# Patient Record
Sex: MALE | Race: BLACK OR AFRICAN AMERICAN | NOT HISPANIC OR LATINO | Employment: OTHER | URBAN - METROPOLITAN AREA
[De-identification: names, ages, dates, MRNs, and addresses within clinical notes are randomized per-mention and may not be internally consistent; named-entity substitution may affect disease eponyms.]

---

## 2017-01-03 RX ORDER — CITALOPRAM 20 MG/1
TABLET ORAL
Qty: 180 TABLET | Refills: 3 | Status: SHIPPED | OUTPATIENT
Start: 2017-01-03 | End: 2017-04-20

## 2017-01-03 RX ORDER — VENLAFAXINE HYDROCHLORIDE 75 MG/1
CAPSULE, EXTENDED RELEASE ORAL
Qty: 360 CAPSULE | Refills: 3 | Status: SHIPPED | OUTPATIENT
Start: 2017-01-03 | End: 2018-02-09 | Stop reason: SDUPTHER

## 2017-01-23 RX ORDER — ALLOPURINOL 300 MG/1
TABLET ORAL
Qty: 30 TABLET | Refills: 0 | Status: SHIPPED | OUTPATIENT
Start: 2017-01-23 | End: 2017-01-27 | Stop reason: SDUPTHER

## 2017-01-23 RX ORDER — AMLODIPINE BESYLATE AND BENAZEPRIL HYDROCHLORIDE 10; 20 MG/1; MG/1
CAPSULE ORAL
Qty: 30 CAPSULE | Refills: 0 | Status: SHIPPED | OUTPATIENT
Start: 2017-01-23 | End: 2017-01-27 | Stop reason: SDUPTHER

## 2017-01-27 RX ORDER — ALLOPURINOL 300 MG/1
300 TABLET ORAL DAILY
Qty: 30 TABLET | Refills: 0 | Status: SHIPPED | OUTPATIENT
Start: 2017-01-27 | End: 2017-02-15 | Stop reason: SDUPTHER

## 2017-01-27 RX ORDER — AMLODIPINE BESYLATE AND BENAZEPRIL HYDROCHLORIDE 10; 20 MG/1; MG/1
1 CAPSULE ORAL DAILY
Qty: 30 CAPSULE | Refills: 0 | Status: SHIPPED | OUTPATIENT
Start: 2017-01-27 | End: 2017-04-10 | Stop reason: SDUPTHER

## 2017-01-31 ENCOUNTER — OFFICE VISIT (OUTPATIENT)
Dept: FAMILY MEDICINE CLINIC | Facility: CLINIC | Age: 62
End: 2017-01-31

## 2017-01-31 VITALS
OXYGEN SATURATION: 99 % | HEART RATE: 87 BPM | HEIGHT: 72 IN | WEIGHT: 315 LBS | TEMPERATURE: 97.7 F | SYSTOLIC BLOOD PRESSURE: 142 MMHG | DIASTOLIC BLOOD PRESSURE: 100 MMHG | BODY MASS INDEX: 42.66 KG/M2

## 2017-01-31 DIAGNOSIS — R52 PAIN: Primary | ICD-10-CM

## 2017-01-31 DIAGNOSIS — M54.50 ACUTE MIDLINE LOW BACK PAIN WITHOUT SCIATICA: ICD-10-CM

## 2017-01-31 DIAGNOSIS — M25.512 ACUTE PAIN OF LEFT SHOULDER: ICD-10-CM

## 2017-01-31 DIAGNOSIS — M10.9 GOUT, UNSPECIFIED CAUSE, UNSPECIFIED CHRONICITY, UNSPECIFIED SITE: Primary | ICD-10-CM

## 2017-01-31 DIAGNOSIS — M54.2 NECK PAIN: ICD-10-CM

## 2017-01-31 DIAGNOSIS — I10 ESSENTIAL HYPERTENSION: ICD-10-CM

## 2017-01-31 LAB
ALBUMIN SERPL-MCNC: 4 G/DL (ref 3.5–5.2)
ALBUMIN/GLOB SERPL: 1.1 G/DL
ALP SERPL-CCNC: 68 U/L (ref 39–117)
ALT SERPL W P-5'-P-CCNC: 17 U/L (ref 1–41)
ANION GAP SERPL CALCULATED.3IONS-SCNC: 12.8 MMOL/L
AST SERPL-CCNC: 18 U/L (ref 1–40)
BILIRUB SERPL-MCNC: 0.3 MG/DL (ref 0.1–1.2)
BUN BLD-MCNC: 12 MG/DL (ref 8–23)
BUN/CREAT SERPL: 11.4 (ref 7–25)
CALCIUM SPEC-SCNC: 9.4 MG/DL (ref 8.6–10.5)
CHLORIDE SERPL-SCNC: 101 MMOL/L (ref 98–107)
CO2 SERPL-SCNC: 29.2 MMOL/L (ref 22–29)
CREAT BLD-MCNC: 1.05 MG/DL (ref 0.76–1.27)
GFR SERPL CREATININE-BSD FRML MDRD: 87 ML/MIN/1.73
GLOBULIN UR ELPH-MCNC: 3.5 GM/DL
GLUCOSE BLD-MCNC: 76 MG/DL (ref 65–99)
POTASSIUM BLD-SCNC: 4 MMOL/L (ref 3.5–5.2)
PROT SERPL-MCNC: 7.5 G/DL (ref 6–8.5)
SODIUM BLD-SCNC: 143 MMOL/L (ref 136–145)
URATE SERPL-MCNC: 4.6 MG/DL (ref 3.4–7)

## 2017-01-31 PROCEDURE — 80053 COMPREHEN METABOLIC PANEL: CPT | Performed by: INTERNAL MEDICINE

## 2017-01-31 PROCEDURE — 99214 OFFICE O/P EST MOD 30 MIN: CPT | Performed by: INTERNAL MEDICINE

## 2017-01-31 PROCEDURE — 73030 X-RAY EXAM OF SHOULDER: CPT | Performed by: INTERNAL MEDICINE

## 2017-01-31 PROCEDURE — 84550 ASSAY OF BLOOD/URIC ACID: CPT | Performed by: INTERNAL MEDICINE
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
## 2017-02-07 ENCOUNTER — HOSPITAL ENCOUNTER (OUTPATIENT)
Dept: PHYSICAL THERAPY | Facility: HOSPITAL | Age: 62
Setting detail: THERAPIES SERIES
Discharge: HOME | End: 2017-02-07
Attending: INTERNAL MEDICINE

## 2017-02-07 ENCOUNTER — TELEPHONE (OUTPATIENT)
Dept: FAMILY MEDICINE CLINIC | Facility: CLINIC | Age: 62
End: 2017-02-07

## 2017-02-07 DIAGNOSIS — S83.92XD SPRAIN OF LEFT KNEE, UNSPECIFIED LIGAMENT, SUBSEQUENT ENCOUNTER: Primary | ICD-10-CM

## 2017-02-07 DIAGNOSIS — S63.502D LEFT WRIST SPRAIN, SUBSEQUENT ENCOUNTER: ICD-10-CM

## 2017-02-07 DIAGNOSIS — S92.215D CLOSED NONDISPLACED FRACTURE OF CUBOID OF LEFT FOOT WITH ROUTINE HEALING, SUBSEQUENT ENCOUNTER: ICD-10-CM

## 2017-02-07 PROCEDURE — 97162 PT EVAL MOD COMPLEX 30 MIN: CPT | Performed by: PHYSICAL THERAPIST

## 2017-02-07 NOTE — TELEPHONE ENCOUNTER
LM INFORMING PT TO CALL US AND LET US KNOW WHICH DR HE WANTS TO SEE    ----- Message from Avel Smith Jr., MD sent at 2/7/2017 11:21 AM EST -----  Contact: [phone]  Patient family used orthopedic surgeon before and he was going to get back with us on who he wanted to use  ----- Message -----     From: Jessica Samuels     Sent: 2/7/2017   8:28 AM       To: Avel Smith Jr., MD, Madhuri Valera    Not sure what they are talking about, no referral in computer  ----- Message -----     From: Madhuri Valera     Sent: 2/7/2017   8:05 AM       To: Jessica Samuels    PT WANTS STATUS OF HIS BONE SPECIALIST REFERRAL

## 2017-02-09 RX ORDER — ZOLPIDEM TARTRATE 10 MG/1
TABLET ORAL
Qty: 30 TABLET | Refills: 0 | OUTPATIENT
Start: 2017-02-09 | End: 2017-03-11 | Stop reason: SDUPTHER
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
## 2017-02-13 RX ORDER — AMLODIPINE BESYLATE AND BENAZEPRIL HYDROCHLORIDE 10; 20 MG/1; MG/1
CAPSULE ORAL
Qty: 30 CAPSULE | Refills: 0 | Status: SHIPPED | OUTPATIENT
Start: 2017-02-13 | End: 2017-02-15 | Stop reason: SDUPTHER

## 2017-02-16 RX ORDER — ALLOPURINOL 300 MG/1
TABLET ORAL
Qty: 90 TABLET | Refills: 3 | Status: SHIPPED | OUTPATIENT
Start: 2017-02-16 | End: 2018-02-04 | Stop reason: SDUPTHER

## 2017-02-16 RX ORDER — SPIRONOLACTONE 25 MG/1
TABLET ORAL
Qty: 90 TABLET | Refills: 3 | Status: SHIPPED | OUTPATIENT
Start: 2017-02-16 | End: 2018-02-04 | Stop reason: SDUPTHER

## 2017-02-16 RX ORDER — AMLODIPINE BESYLATE AND BENAZEPRIL HYDROCHLORIDE 10; 20 MG/1; MG/1
CAPSULE ORAL
Qty: 90 CAPSULE | Refills: 3 | Status: SHIPPED | OUTPATIENT
Start: 2017-02-16 | End: 2018-02-01

## 2017-02-21 ENCOUNTER — HOSPITAL ENCOUNTER (OUTPATIENT)
Dept: PHYSICAL THERAPY | Facility: HOSPITAL | Age: 62
Setting detail: THERAPIES SERIES
Discharge: HOME | End: 2017-02-21

## 2017-02-21 DIAGNOSIS — S83.92XD SPRAIN OF LEFT KNEE, UNSPECIFIED LIGAMENT, SUBSEQUENT ENCOUNTER: Primary | ICD-10-CM

## 2017-02-21 DIAGNOSIS — S63.502D LEFT WRIST SPRAIN, SUBSEQUENT ENCOUNTER: ICD-10-CM

## 2017-02-21 DIAGNOSIS — S92.215D CLOSED NONDISPLACED FRACTURE OF CUBOID OF LEFT FOOT WITH ROUTINE HEALING, SUBSEQUENT ENCOUNTER: ICD-10-CM

## 2017-02-21 PROCEDURE — 97113 AQUATIC THERAPY/EXERCISES: CPT | Performed by: PHYSICAL THERAPIST
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
## 2017-02-23 ENCOUNTER — TELEPHONE (OUTPATIENT)
Dept: FAMILY MEDICINE CLINIC | Facility: CLINIC | Age: 62
End: 2017-02-23

## 2017-02-23 DIAGNOSIS — S92.212A: ICD-10-CM

## 2017-02-23 DIAGNOSIS — M25.512 LEFT SHOULDER PAIN, UNSPECIFIED CHRONICITY: Primary | ICD-10-CM
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
## 2017-02-24 ENCOUNTER — HOSPITAL ENCOUNTER (OUTPATIENT)
Dept: PHYSICAL THERAPY | Facility: HOSPITAL | Age: 62
Setting detail: THERAPIES SERIES
Discharge: HOME | End: 2017-02-24

## 2017-02-24 DIAGNOSIS — S92.215D CLOSED NONDISPLACED FRACTURE OF CUBOID OF LEFT FOOT WITH ROUTINE HEALING, SUBSEQUENT ENCOUNTER: ICD-10-CM

## 2017-02-24 DIAGNOSIS — S83.92XD SPRAIN OF LEFT KNEE, UNSPECIFIED LIGAMENT, SUBSEQUENT ENCOUNTER: Primary | ICD-10-CM

## 2017-02-24 DIAGNOSIS — S63.502D LEFT WRIST SPRAIN, SUBSEQUENT ENCOUNTER: ICD-10-CM

## 2017-02-24 PROCEDURE — 97113 AQUATIC THERAPY/EXERCISES: CPT | Performed by: PHYSICAL THERAPIST

## 2017-02-24 NOTE — PROGRESS NOTES
Outpatient Physical Therapy Ortho Treatment Note  [facility]     Patient Name: Maik Lara  : 1955  MRN: [id number]  Today's Date: 2017      Visit Date: 2017    Visit Dx:    ICD-10-CM ICD-9-CM   1. Sprain of left knee, unspecified ligament, subsequent encounter S83.92XD 844.9   2. Left wrist sprain, subsequent encounter S63.502D V58.89     842.00   3. Closed nondisplaced fracture of cuboid of left foot with routine healing, subsequent encounter S92.215D V54.19       There is no problem list on file for this patient.       Past Medical History   Diagnosis Date   • Allergic rhinitis    • Anxiety    • Arthritis    • Cholecystitis    • Cholelithiasis    • Chronic pain    • Depression    • Hypertension    • Rhabdomyolysis    • Sleep apnea         Past Surgical History   Procedure Laterality Date   • Anal fistulotomy     • Foot surgery Right              PT Ortho       17 1145    Subjective Comments    Subjective Comments Continues to wear cast shoe on L. Attempted to use mother's cane, but it's too small, so looking into purchasing a new one to use. Shoulder/upper trap pain on L is greater than foot pain at this time.  -JS    Subjective Pain    Able to rate subjective pain? yes  -JS    Pre-Treatment Pain Level 8   shoulder/upper trap. 5/10 L foot  -JS    Post-Treatment Pain Level 7   shoulder/upper trap. 4/10 L foot  -JS    Posture/Observations    Posture/Observations Comments Arrived to pool area with flip flops, no wrist brace  -JS      User Key  (r) = Recorded By, (t) = Taken By, (c) = Cosigned By    Initials Name Provider Type    MICHELLE Villanueva PT Physical Therapist                            PT Assessment/Plan       17 0905       PT Assessment    Functional Limitations --  -KH     Impairments --  -KH     Assessment Comments Utilized large noodle for hip stretches today which helped alleviate muscle tightness. Provided vcs during squats to bring shoulders forward in order to  maintain a balanced COG. Once again performed shoulder exercises in deep end and avoided adding resistance since patient felt sore after his first session.   -     PT Plan    PT Plan Comments Continue aquatic therapy for gait, ROM, strengthening, and flexibility.   -       User Key  (r) = Recorded By, (t) = Taken By, (c) = Cosigned By    Initials Name Provider Type    CRYSTAL Riddle, PT Physical Therapist                    Exercises       02/24/17 0900          Subjective Comments    Subjective Comments I feel like I need my own personal pharmacy with all the pills I'm on. I feel much better in the water though, especially in the deep end.   -      Subjective Pain    Able to rate subjective pain? yes  -      Pre-Treatment Pain Level 8  -      Post-Treatment Pain Level 7  -      Aquatics    Aquatics performed? Yes  -      Exercise 1    Exercise Name 1 Refer to aquatics flow sheet  -        User Key  (r) = Recorded By, (t) = Taken By, (c) = Cosigned By    Initials Name Provider Type    CRYSTAL Riddle, PT Physical Therapist                               PT OP Goals       02/24/17 0900       PT Short Term Goals    STG Date to Achieve 02/21/17  -     STG 1 Independent with initial home program for ROM/strength.  -     STG 1 Progress Met  -     STG 2 Pt to report decreased pain during water ex to 3 or less.  -     STG 2 Progress Ongoing  -     STG 2 Progress Comments better in the water but still ~5/20  -     STG 3 Pt to ambulate with cane for unlevel surfaces to decrease weight on foot fracture.  -     STG 3 Progress Ongoing  -     STG 3 Progress Comments hasn't bought cane yet  -     Long Term Goals    LTG Date to Achieve 03/07/17  -     LTG 1 Independent with aquatics program for self management of symptoms.  -     LTG 1 Progress Ongoing  -     LTG 2 Pt to increase LE strength so he can go 5xSS in 15 sec or less.  -     LTG 2 Progress Ongoing  -     LTG 3 Pt to improve  mobility so he can tolerate walking for 15 minutes without resting.  -CRYSTAL     LTG 3 Progress Ongoing  -CRYSTAL       User Key  (r) = Recorded By, (t) = Taken By, (c) = Cosigned By    Initials Name Provider Type    CRYSTAL Riddle PT Physical Therapist                    Time Calculation:   Start Time: 0905  Stop Time: 0945  Time Calculation (min): 40 min    Therapy Charges for Today     Code Description Service Date Service Provider Modifiers Qty    41286854112  PT AQUATIC THERAPY EA 15 MIN 2/24/2017 Caryn Riddle, PT GP 3                    Caryn Riddle PT  2/24/2017

## 2017-02-28 ENCOUNTER — HOSPITAL ENCOUNTER (OUTPATIENT)
Dept: PHYSICAL THERAPY | Facility: HOSPITAL | Age: 62
Setting detail: THERAPIES SERIES
Discharge: HOME | End: 2017-02-28

## 2017-02-28 DIAGNOSIS — S63.502D LEFT WRIST SPRAIN, SUBSEQUENT ENCOUNTER: ICD-10-CM

## 2017-02-28 DIAGNOSIS — S92.215D CLOSED NONDISPLACED FRACTURE OF CUBOID OF LEFT FOOT WITH ROUTINE HEALING, SUBSEQUENT ENCOUNTER: ICD-10-CM

## 2017-02-28 DIAGNOSIS — S83.92XD SPRAIN OF LEFT KNEE, UNSPECIFIED LIGAMENT, SUBSEQUENT ENCOUNTER: Primary | ICD-10-CM

## 2017-02-28 PROCEDURE — 97113 AQUATIC THERAPY/EXERCISES: CPT | Performed by: PHYSICAL THERAPIST

## 2017-03-03 ENCOUNTER — HOSPITAL ENCOUNTER (OUTPATIENT)
Dept: PHYSICAL THERAPY | Facility: HOSPITAL | Age: 62
Setting detail: THERAPIES SERIES
Discharge: HOME | End: 2017-03-03

## 2017-03-03 DIAGNOSIS — M54.50 ACUTE BILATERAL LOW BACK PAIN WITHOUT SCIATICA: ICD-10-CM

## 2017-03-03 DIAGNOSIS — M54.2 CERVICAL PAIN: Primary | ICD-10-CM

## 2017-03-03 DIAGNOSIS — M25.512 ACUTE PAIN OF LEFT SHOULDER: ICD-10-CM

## 2017-03-03 PROCEDURE — 97140 MANUAL THERAPY 1/> REGIONS: CPT | Performed by: PHYSICAL THERAPIST

## 2017-03-03 PROCEDURE — 97110 THERAPEUTIC EXERCISES: CPT | Performed by: PHYSICAL THERAPIST
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
## 2017-03-07 ENCOUNTER — HOSPITAL ENCOUNTER (OUTPATIENT)
Dept: PHYSICAL THERAPY | Facility: HOSPITAL | Age: 62
Setting detail: THERAPIES SERIES
Discharge: HOME | End: 2017-03-07

## 2017-03-07 DIAGNOSIS — M54.2 CERVICAL PAIN: Primary | ICD-10-CM

## 2017-03-07 DIAGNOSIS — S83.92XD SPRAIN OF LEFT KNEE, UNSPECIFIED LIGAMENT, SUBSEQUENT ENCOUNTER: ICD-10-CM

## 2017-03-07 DIAGNOSIS — M25.512 ACUTE PAIN OF LEFT SHOULDER: ICD-10-CM

## 2017-03-07 DIAGNOSIS — S92.215D CLOSED NONDISPLACED FRACTURE OF CUBOID OF LEFT FOOT WITH ROUTINE HEALING, SUBSEQUENT ENCOUNTER: ICD-10-CM

## 2017-03-07 DIAGNOSIS — S63.502D LEFT WRIST SPRAIN, SUBSEQUENT ENCOUNTER: ICD-10-CM

## 2017-03-07 DIAGNOSIS — M54.50 ACUTE BILATERAL LOW BACK PAIN WITHOUT SCIATICA: ICD-10-CM

## 2017-03-07 PROCEDURE — 97113 AQUATIC THERAPY/EXERCISES: CPT | Performed by: PHYSICAL THERAPIST

## 2017-03-07 NOTE — PROGRESS NOTES
Outpatient Physical Therapy Ortho Treatment Note  [facility]     Patient Name: Maik Lara  : 1955  MRN: [id number]  Today's Date: 3/7/2017      Visit Date: 2017    Visit Dx:    ICD-10-CM ICD-9-CM   1. Cervical pain M54.2 723.1   2. Acute pain of left shoulder M25.512 719.41   3. Acute bilateral low back pain without sciatica M54.5 724.2     338.19   4. Sprain of left knee, unspecified ligament, subsequent encounter S83.92XD 844.9   5. Left wrist sprain, subsequent encounter S63.502D V58.89     842.00   6. Closed nondisplaced fracture of cuboid of left foot with routine healing, subsequent encounter S92.215D V54.19       There is no problem list on file for this patient.       Past Medical History   Diagnosis Date   • Allergic rhinitis    • Anxiety    • Arthritis    • Cholecystitis    • Cholelithiasis    • Chronic pain    • Depression    • Hypertension    • Rhabdomyolysis    • Sleep apnea         Past Surgical History   Procedure Laterality Date   • Anal fistulotomy     • Foot surgery Right                              PT Assessment/Plan       17 1208       PT Assessment    Assessment Comments Patient reports that he was sore after last session but able to feel he is using his muscles.  He still is painful to all reported joints with lesser pain walking in L foot.  -PB       User Key  (r) = Recorded By, (t) = Taken By, (c) = Cosigned By    Initials Name Provider Type    MARIA G Solano, PT Physical Therapist                    Exercises       17 0900          Subjective Comments    Subjective Comments Patient arrived late for appt  -PB      Subjective Pain    Able to rate subjective pain? yes  -PB      Pre-Treatment Pain Level 7  -PB      Post-Treatment Pain Level 6  -PB      Aquatics    Aquatics performed? Yes  -PB      Aquatics LE    Water Walk forward;side   x 3  -PB      Stretch 1 Quad SR x 60 sec  -PB      Stretch 2 HS LN x 60 sec  -PB      Vertical Traction LN with rail deep  water x 5 min  -PB      Abdominals --    KB 1/3 x 20  -PB      Hip Abd/Add Leg press LR x 20  -PB      March in Place --  -PB      Bicycle --  -PB      Aquatics UE    Stretch 1 --  -PB      Stretch 2 --  -PB      Scap Retraction/Row BP L5 x20  -PB      External Rotation @ 0 BP L5 x 20  -PB      I's/T's/Y's T BP L5 x 20  -PB      Abdominals Pelvic tilt with march x 10  -PB        User Key  (r) = Recorded By, (t) = Taken By, (c) = Cosigned By    Initials Name Provider Type    PB Susan Solano, PT Physical Therapist                                       Time Calculation:   Start Time: 1005  Stop Time: 1030  Time Calculation (min): 25 min    Therapy Charges for Today     Code Description Service Date Service Provider Modifiers Qty    76800624629 HC PT AQUATIC THERAPY EA 15 MIN 3/7/2017 Susan Solano, PT GP 2                    Susan Solano, PT  3/7/2017

## 2017-03-09 ENCOUNTER — HOSPITAL ENCOUNTER (OUTPATIENT)
Dept: PHYSICAL THERAPY | Facility: HOSPITAL | Age: 62
Setting detail: THERAPIES SERIES
Discharge: HOME | End: 2017-03-09

## 2017-03-09 DIAGNOSIS — M54.50 ACUTE BILATERAL LOW BACK PAIN WITHOUT SCIATICA: ICD-10-CM

## 2017-03-09 DIAGNOSIS — S92.215D CLOSED NONDISPLACED FRACTURE OF CUBOID OF LEFT FOOT WITH ROUTINE HEALING, SUBSEQUENT ENCOUNTER: ICD-10-CM

## 2017-03-09 DIAGNOSIS — M54.2 CERVICAL PAIN: Primary | ICD-10-CM

## 2017-03-09 DIAGNOSIS — M25.512 ACUTE PAIN OF LEFT SHOULDER: ICD-10-CM

## 2017-03-09 DIAGNOSIS — S63.502D LEFT WRIST SPRAIN, SUBSEQUENT ENCOUNTER: ICD-10-CM

## 2017-03-09 DIAGNOSIS — S83.92XD SPRAIN OF LEFT KNEE, UNSPECIFIED LIGAMENT, SUBSEQUENT ENCOUNTER: ICD-10-CM

## 2017-03-09 PROCEDURE — 97113 AQUATIC THERAPY/EXERCISES: CPT | Performed by: PHYSICAL THERAPIST

## 2017-03-09 NOTE — PROGRESS NOTES
Outpatient Physical Therapy Ortho Treatment Note  [facility]     Patient Name: Maik Lara  : 1955  MRN: [id number]  Today's Date: 3/9/2017      Visit Date: 2017    Visit Dx:    ICD-10-CM ICD-9-CM   1. Cervical pain M54.2 723.1   2. Acute pain of left shoulder M25.512 719.41   3. Acute bilateral low back pain without sciatica M54.5 724.2     338.19   4. Sprain of left knee, unspecified ligament, subsequent encounter S83.92XD 844.9   5. Left wrist sprain, subsequent encounter S63.502D V58.89     842.00   6. Closed nondisplaced fracture of cuboid of left foot with routine healing, subsequent encounter S92.215D V54.19       There is no problem list on file for this patient.       Past Medical History   Diagnosis Date   • Allergic rhinitis    • Anxiety    • Arthritis    • Cholecystitis    • Cholelithiasis    • Chronic pain    • Depression    • Hypertension    • Rhabdomyolysis    • Sleep apnea         Past Surgical History   Procedure Laterality Date   • Anal fistulotomy     • Foot surgery Right                              PT Assessment/Plan       17 1218       PT Assessment    Assessment Comments Patient reports degree of less muscle  and shoulder soreness; he still has ringing in ears and as been recommended to see ENT that I suggest he contact primary MD to help expediate appointment.  Treatment for left shoulder impingement also ordered by Dr. Jameel Quick with script in chart.   Maik id following HEP with band for shoulder.   -PB       User Key  (r) = Recorded By, (t) = Taken By, (c) = Cosigned By    Initials Name Provider Type    PB Susan Solano, PT Physical Therapist                    Exercises       17 1200          Subjective Comments    Subjective Comments Saw ortho MD Muro for shoulder and he saw 2 spurs on x-ray and gave him steroid injection and script to continue PT  -PB      Subjective Pain    Able to rate subjective pain? yes  -PB      Pre-Treatment Pain Level 6  -PB       Post-Treatment Pain Level 6  -PB      Subjective Pain Comment Patient reports that he is feeling less sore all over but continues to have ringing in ears that keeps him from playing his instruments   -PB      Aquatics    Aquatics performed? Yes  -PB      Aquatics LE    Water Walk forward;side   x 3  -PB      Stretch 1 Quad LN x 60 sec  -PB      Stretch 2 HS LN x 60 sec  -PB      Vertical Traction LN with rail deep water x 5 min  -PB      Abdominals --   LN x 20   -PB      Bicycle LN x 2 min  -PB      Aquatics UE    Scap Retraction/Row BP L5 x20  -PB      Scaption Abd/Add BP L5 x 20   -PB      External Rotation @ 0 BP L5 x 20  -PB      I's/T's/Y's T BP L5 x 20  -PB      Abdominals Pelvic tilt with march x 10  -PB        User Key  (r) = Recorded By, (t) = Taken By, (c) = Cosigned By    Initials Name Provider Type    PB Susan Solano, PT Physical Therapist                                       Time Calculation:   Start Time: 0946  Stop Time: 1030  Time Calculation (min): 44 min    Therapy Charges for Today     Code Description Service Date Service Provider Modifiers Qty    39448341810 HC PT AQUATIC THERAPY EA 15 MIN 3/9/2017 Susan Solano, PT GP 3                    Susan Solano, PT  3/9/2017

## 2017-03-11 RX ORDER — ZOLPIDEM TARTRATE 10 MG/1
TABLET ORAL
Qty: 30 TABLET | Refills: 0 | OUTPATIENT
Start: 2017-03-11 | End: 2017-04-14 | Stop reason: SDUPTHER

## 2017-03-14 ENCOUNTER — HOSPITAL ENCOUNTER (OUTPATIENT)
Dept: PHYSICAL THERAPY | Facility: HOSPITAL | Age: 62
Setting detail: THERAPIES SERIES
Discharge: HOME | End: 2017-03-14

## 2017-03-14 DIAGNOSIS — M25.512 ACUTE PAIN OF LEFT SHOULDER: ICD-10-CM

## 2017-03-14 DIAGNOSIS — S83.92XD SPRAIN OF LEFT KNEE, UNSPECIFIED LIGAMENT, SUBSEQUENT ENCOUNTER: ICD-10-CM

## 2017-03-14 DIAGNOSIS — M54.2 CERVICAL PAIN: Primary | ICD-10-CM

## 2017-03-14 DIAGNOSIS — S63.502D LEFT WRIST SPRAIN, SUBSEQUENT ENCOUNTER: ICD-10-CM

## 2017-03-14 DIAGNOSIS — S92.215D CLOSED NONDISPLACED FRACTURE OF CUBOID OF LEFT FOOT WITH ROUTINE HEALING, SUBSEQUENT ENCOUNTER: ICD-10-CM

## 2017-03-14 DIAGNOSIS — M54.50 ACUTE BILATERAL LOW BACK PAIN WITHOUT SCIATICA: ICD-10-CM

## 2017-03-14 PROCEDURE — 97113 AQUATIC THERAPY/EXERCISES: CPT | Performed by: PHYSICAL THERAPIST

## 2017-03-14 NOTE — PROGRESS NOTES
Outpatient Physical Therapy Ortho Progress Note  [facility]     Patient Name: Maik Lara  : 1955  MRN: [id number]  Today's Date: 3/14/2017      Visit Date: 2017    Visit Dx:    ICD-10-CM ICD-9-CM   1. Cervical pain M54.2 723.1   2. Acute pain of left shoulder M25.512 719.41   3. Acute bilateral low back pain without sciatica M54.5 724.2     338.19   4. Sprain of left knee, unspecified ligament, subsequent encounter S83.92XD 844.9   5. Left wrist sprain, subsequent encounter S63.502D V58.89     842.00   6. Closed nondisplaced fracture of cuboid of left foot with routine healing, subsequent encounter S92.215D V54.19       There is no problem list on file for this patient.       Past Medical History   Diagnosis Date   • Allergic rhinitis    • Anxiety    • Arthritis    • Cholecystitis    • Cholelithiasis    • Chronic pain    • Depression    • Hypertension    • Rhabdomyolysis    • Sleep apnea         Past Surgical History   Procedure Laterality Date   • Anal fistulotomy     • Foot surgery Right                              PT Assessment/Plan       17 1213       PT Assessment    Assessment Comments Patient reporting decreasing pain symptoms and ability to drive 2.5 hr round trip over weekend with less neck rotation required on highway   -PB       User Key  (r) = Recorded By, (t) = Taken By, (c) = Cosigned By    Initials Name Provider Type    MARIA G Solano, PT Physical Therapist                    Exercises       17 1200 17 0900       Subjective Comments    Subjective Comments  Stiff but improving.  Still a struggle to turn head to drive.  -PB     Subjective Pain    Able to rate subjective pain?  yes  -PB     Pre-Treatment Pain Level  4  -PB     Post-Treatment Pain Level  4  -PB     Aquatics    Aquatics performed?  Yes  -PB     Aquatics LE    Water Walk  forward;side   x 3  -PB     Stretch 1  Quad LR x 60 sec  -PB     Stretch 2  HS LN x 60 sec  -PB     Vertical Traction  Deep  water with rail deep water x 5 min  -PB     Abdominals  --   KB x 20   -PB     Clams  Hip circles 10/2  -PB     Hip Abd/Add  Leg press LR x 20  -PB     March in Place  Abd brace with march x 20  -PB     Bicycle  Rail x 2 min  -PB     Aquatics UE    Scap Retraction/Row BP L5 x 30  -PB      Scaption Abd/Add BP L5 x 30  -PB      External Rotation @ 0 BP L5 x 30  -PB      I's/T's/Y's T BP L5 x 25  -PB      Abdominals Pelvic tilt with march x 10  -PB        User Key  (r) = Recorded By, (t) = Taken By, (c) = Cosigned By    Initials Name Provider Type    PB Susan Solano, PT Physical Therapist                                       Time Calculation:   Start Time: 0946  Stop Time: 1030  Time Calculation (min): 44 min    Therapy Charges for Today     Code Description Service Date Service Provider Modifiers Qty    65170921108 HC PT AQUATIC THERAPY EA 15 MIN 3/14/2017 Susan Solano, PT GP 3                    Susan Solano, PT  3/14/2017

## 2017-03-16 ENCOUNTER — HOSPITAL ENCOUNTER (OUTPATIENT)
Dept: PHYSICAL THERAPY | Facility: HOSPITAL | Age: 62
Setting detail: THERAPIES SERIES
Discharge: HOME | End: 2017-03-16

## 2017-03-16 DIAGNOSIS — S92.215D CLOSED NONDISPLACED FRACTURE OF CUBOID OF LEFT FOOT WITH ROUTINE HEALING, SUBSEQUENT ENCOUNTER: ICD-10-CM

## 2017-03-16 DIAGNOSIS — M54.50 ACUTE BILATERAL LOW BACK PAIN WITHOUT SCIATICA: ICD-10-CM

## 2017-03-16 DIAGNOSIS — S83.92XD SPRAIN OF LEFT KNEE, UNSPECIFIED LIGAMENT, SUBSEQUENT ENCOUNTER: ICD-10-CM

## 2017-03-16 DIAGNOSIS — S63.502D LEFT WRIST SPRAIN, SUBSEQUENT ENCOUNTER: ICD-10-CM

## 2017-03-16 DIAGNOSIS — M54.2 CERVICAL PAIN: Primary | ICD-10-CM

## 2017-03-16 DIAGNOSIS — M25.512 ACUTE PAIN OF LEFT SHOULDER: ICD-10-CM

## 2017-03-16 PROCEDURE — 97113 AQUATIC THERAPY/EXERCISES: CPT | Performed by: PHYSICAL THERAPIST

## 2017-03-16 NOTE — PROGRESS NOTES
Outpatient Physical Therapy Ortho Treatment Note  [facility]     Patient Name: Maik Marco  : 1955  MRN: [id number]  Today's Date: 3/16/2017      Visit Date: 2017    Visit Dx:    ICD-10-CM ICD-9-CM   1. Cervical pain M54.2 723.1   2. Acute pain of left shoulder M25.512 719.41   3. Acute bilateral low back pain without sciatica M54.5 724.2     338.19   4. Sprain of left knee, unspecified ligament, subsequent encounter S83.92XD 844.9   5. Left wrist sprain, subsequent encounter S63.502D V58.89     842.00   6. Closed nondisplaced fracture of cuboid of left foot with routine healing, subsequent encounter S92.215D V54.19       There is no problem list on file for this patient.       Past Medical History   Diagnosis Date   • Allergic rhinitis    • Anxiety    • Arthritis    • Cholecystitis    • Cholelithiasis    • Chronic pain    • Depression    • Hypertension    • Rhabdomyolysis    • Sleep apnea         Past Surgical History   Procedure Laterality Date   • Anal fistulotomy     • Foot surgery Right                              PT Assessment/Plan       17 1229       PT Assessment    Assessment Comments Even standing in deepest water Maik reports R hip pain with weight bearing on that leg.  He was able to perform increased hip ROM exercises with some soreness present   -PB       User Key  (r) = Recorded By, (t) = Taken By, (c) = Cosigned By    Initials Name Provider Type    PB Susan Solano, PT Physical Therapist                    Exercises       17 0900          Subjective Comments    Subjective Comments Today is not a great day.  Pain in R hip joint, L knee and foot both hurt and L shoulder sore.   -PB      Subjective Pain    Able to rate subjective pain? yes  -PB      Pre-Treatment Pain Level 6  -PB      Post-Treatment Pain Level 5  -PB      Aquatics    Aquatics performed? Yes  -PB      Aquatics LE    Water Walk forward;side   x 3  -PB      Stretch 1 Quad LN x 60 sec  -PB      Stretch 2  HS LN x 60 sec  -PB      Stretch 3 Piriformis standing x 30 sec  -PB      Stretch Other 1 Lower back at wall several times   -PB      Stretch Other 2 Squatting down to stretch lower back   -PB      Vertical Traction Deep water with rail deep water x 5 min  -PB      Abdominals --   LN x 20   -PB      Clams Hip circles 10/2; clams x 10   -PB      Hip Abd/Add --  -PB      March in Place Abd brace with march x 20  -PB      Bicycle Rail x 2 min  -PB      Aquatics UE    Scap Retraction/Row BP L5 x 30  -PB      Scaption Abd/Add BP L5 x 30  -PB      External Rotation @ 0 BP L5 x 30  -PB      I's/T's/Y's T BP L5 x 25  -PB      Abdominals Pelvic tilt with march x 10  -PB        User Key  (r) = Recorded By, (t) = Taken By, (c) = Cosigned By    Initials Name Provider Type    PB Susan Solano, PT Physical Therapist                                       Time Calculation:   Start Time: 0946  Stop Time: 1030  Time Calculation (min): 44 min    Therapy Charges for Today     Code Description Service Date Service Provider Modifiers Qty    13569287348 HC PT AQUATIC THERAPY EA 15 MIN 3/16/2017 Susan Solano, PT GP 3                    Susan Solano, PT  3/16/2017

## 2017-03-21 ENCOUNTER — HOSPITAL ENCOUNTER (OUTPATIENT)
Dept: PHYSICAL THERAPY | Facility: HOSPITAL | Age: 62
Setting detail: THERAPIES SERIES
Discharge: HOME | End: 2017-03-21

## 2017-03-21 DIAGNOSIS — M54.2 CERVICAL PAIN: Primary | ICD-10-CM

## 2017-03-21 DIAGNOSIS — S92.215D CLOSED NONDISPLACED FRACTURE OF CUBOID OF LEFT FOOT WITH ROUTINE HEALING, SUBSEQUENT ENCOUNTER: ICD-10-CM

## 2017-03-21 DIAGNOSIS — S63.502D LEFT WRIST SPRAIN, SUBSEQUENT ENCOUNTER: ICD-10-CM

## 2017-03-21 DIAGNOSIS — M54.50 ACUTE BILATERAL LOW BACK PAIN WITHOUT SCIATICA: ICD-10-CM

## 2017-03-21 DIAGNOSIS — S83.92XD SPRAIN OF LEFT KNEE, UNSPECIFIED LIGAMENT, SUBSEQUENT ENCOUNTER: ICD-10-CM

## 2017-03-21 DIAGNOSIS — M25.512 ACUTE PAIN OF LEFT SHOULDER: ICD-10-CM

## 2017-03-21 PROCEDURE — 97113 AQUATIC THERAPY/EXERCISES: CPT | Performed by: PHYSICAL THERAPIST

## 2017-03-21 NOTE — PROGRESS NOTES
Outpatient Physical Therapy Ortho Treatment Note  [facility]     Patient Name: Maik Lara  : 1955  MRN: [id number]  Today's Date: 3/21/2017      Visit Date: 2017    Visit Dx:    ICD-10-CM ICD-9-CM   1. Cervical pain M54.2 723.1   2. Acute pain of left shoulder M25.512 719.41   3. Acute bilateral low back pain without sciatica M54.5 724.2     338.19   4. Sprain of left knee, unspecified ligament, subsequent encounter S83.92XD 844.9   5. Left wrist sprain, subsequent encounter S63.502D V58.89     842.00   6. Closed nondisplaced fracture of cuboid of left foot with routine healing, subsequent encounter S92.215D V54.19       There is no problem list on file for this patient.       Past Medical History   Diagnosis Date   • Allergic rhinitis    • Anxiety    • Arthritis    • Cholecystitis    • Cholelithiasis    • Chronic pain    • Depression    • Hypertension    • Rhabdomyolysis    • Sleep apnea         Past Surgical History   Procedure Laterality Date   • Anal fistulotomy     • Foot surgery Right                              PT Assessment/Plan       17 1245       PT Assessment    Assessment Comments Maik reports new pain symptoms radiating more to B groins that may be referred from upper lumbar so focused on more core strength and stretching today that feels good to him.  -PB       User Key  (r) = Recorded By, (t) = Taken By, (c) = Cosigned By    Initials Name Provider Type    PB Susan Solano, PT Physical Therapist                    Exercises       17 0900          Subjective Comments    Subjective Comments Both groin muscles hurt since waking up Sat.  Feels like it might be nerve.  Left second finger locked up and muscle twitching going down both arms.  -PB      Subjective Pain    Able to rate subjective pain? yes  -PB      Pre-Treatment Pain Level 6  -PB      Post-Treatment Pain Level 5  -PB      Subjective Pain Comment Right lower back and groins  -PB      Aquatics    Aquatics  "performed? Yes  -PB      Aquatics LE    Water Walk forward;side   x 3  -PB      Stretch 1 Hip adductors 30\"/2  -PB      Stretch 2 HS LN x 60 sec  -PB      Stretch 3 Piriformis standing x 30 sec  -PB      Stretch Other 1 Lower back at wall several times   -PB      Stretch Other 2 Squatting down to stretch lower back   -PB      Vertical Traction Deep water with rail deep water x 5 min  -PB      Abdominals --   LN x 20, KB x 20  -PB      Clams --  -PB      Hip Abd/Add Leg press LR x 20  -PB      March in Place Abd brace with march x 20  -PB      Bicycle Rail x 2 min  -PB      Aquatics UE    Scap Retraction/Row BP L5 x 15  -PB      Scaption Abd/Add BP L5 x 15  -PB      External Rotation @ 0 BP L5 x 30  -PB      I's/T's/Y's T BP L5 x 25  -PB      Abdominals Pelvic tilt with march x 10  -PB        User Key  (r) = Recorded By, (t) = Taken By, (c) = Cosigned By    Initials Name Provider Type    PB Susan Solano, PT Physical Therapist                                       Time Calculation:   Start Time: 0946  Stop Time: 1030  Time Calculation (min): 44 min    Therapy Charges for Today     Code Description Service Date Service Provider Modifiers Qty    68699313628 HC PT AQUATIC THERAPY EA 15 MIN 3/21/2017 Susan Solano, PT GP 3                    Susan Solano, PT  3/21/2017     "

## 2017-03-22 ENCOUNTER — HOSPITAL ENCOUNTER (OUTPATIENT)
Dept: PHYSICAL THERAPY | Facility: HOSPITAL | Age: 62
Setting detail: THERAPIES SERIES
Discharge: HOME | End: 2017-03-22

## 2017-03-22 DIAGNOSIS — S92.215D CLOSED NONDISPLACED FRACTURE OF CUBOID OF LEFT FOOT WITH ROUTINE HEALING, SUBSEQUENT ENCOUNTER: ICD-10-CM

## 2017-03-22 DIAGNOSIS — M25.512 ACUTE PAIN OF LEFT SHOULDER: ICD-10-CM

## 2017-03-22 DIAGNOSIS — M54.50 ACUTE BILATERAL LOW BACK PAIN WITHOUT SCIATICA: ICD-10-CM

## 2017-03-22 DIAGNOSIS — M54.2 CERVICAL PAIN: Primary | ICD-10-CM

## 2017-03-22 DIAGNOSIS — S63.502D LEFT WRIST SPRAIN, SUBSEQUENT ENCOUNTER: ICD-10-CM

## 2017-03-22 DIAGNOSIS — S83.92XD SPRAIN OF LEFT KNEE, UNSPECIFIED LIGAMENT, SUBSEQUENT ENCOUNTER: ICD-10-CM

## 2017-03-22 PROCEDURE — 97110 THERAPEUTIC EXERCISES: CPT | Performed by: PHYSICAL THERAPIST

## 2017-03-22 PROCEDURE — 97140 MANUAL THERAPY 1/> REGIONS: CPT | Performed by: PHYSICAL THERAPIST

## 2017-03-22 NOTE — PROGRESS NOTES
Outpatient Physical Therapy Ortho Treatment Note  [facility]     Patient Name: Maik Marco  : 1955  MRN: [id number]  Today's Date: 3/22/2017      Visit Date: 2017    Visit Dx:    ICD-10-CM ICD-9-CM   1. Cervical pain M54.2 723.1   2. Acute pain of left shoulder M25.512 719.41   3. Acute bilateral low back pain without sciatica M54.5 724.2     338.19   4. Sprain of left knee, unspecified ligament, subsequent encounter S83.92XD 844.9   5. Left wrist sprain, subsequent encounter S63.502D V58.89     842.00   6. Closed nondisplaced fracture of cuboid of left foot with routine healing, subsequent encounter S92.215D V54.19       There is no problem list on file for this patient.       Past Medical History:   Diagnosis Date   • Allergic rhinitis    • Anxiety    • Arthritis    • Cholecystitis    • Cholelithiasis    • Chronic pain    • Depression    • Hypertension    • Rhabdomyolysis    • Sleep apnea         Past Surgical History:   Procedure Laterality Date   • ANAL FISTULOTOMY     • FOOT SURGERY Right                              PT Assessment/Plan       17 1142 17 1245    PT Assessment    Assessment Comments Maik shows progress towards PT goals other than no change in ringing in his ears.  He is trying to get an ENT appointment scheduled with help from primary MD office.    -PB Maik reports new pain symptoms radiating more to B groins that may be referred from upper lumbar so focused on more core strength and stretching today that feels good to him.  -PB      User Key  (r) = Recorded By, (t) = Taken By, (c) = Cosigned By    Initials Name Provider Type    PB Susan Solano, PT Physical Therapist                    Exercises       17 1100          Subjective Comments    Subjective Comments I still have trouble looking over L shoulder driving.  The ringing in the ears in maybe a little worse.  I went by MD office again.   -PB      Subjective Pain    Able to rate subjective pain? yes   -PB      Pre-Treatment Pain Level 6  -PB      Post-Treatment Pain Level 5  -PB      Aquatics    Aquatics performed? No  -PB      Exercise 1    Exercise Name 1 Land based exercises performed for passive stretching B hamstrings, hip rotators in and out.  Patient demonstrated good isolation of lower abdominal muscles with single and B leg raises and able to extend legs straight with B hip abduction/ add for more advanced abdominal strengthening but prefer he not challenge with harder exercises when he is more painful.  LTR causes pain on R side of lower back rotating knees to the L side. Verbally discussed shoulder exercises he is performing with 10 pound weights at home.   -PB        User Key  (r) = Recorded By, (t) = Taken By, (c) = Cosigned By    Initials Name Provider Type    MARIA G Solano PT Physical Therapist                        Manual Rx (last 36 hours)      Manual Treatments       03/22/17 1100          Manual Rx 1    Manual Rx 1 Location Cervical spine   -PB      Manual Rx 1 Type Cervical distraction   -PB      Manual Rx 1 Grade Manual distraction   -PB      Manual Rx 2    Manual Rx 2 Location Cervical spine   -PB      Manual Rx 2 Type Suboccipital release hold   -PB      Manual Rx 3    Manual Rx 3 Location Cervical spine   -PB      Manual Rx 3 Type STM   -PB      Manual Rx 3 Grade Suboccipitals, cervical PVM, SCM to UT   -PB      Manual Rx 3 Duration Total manual time is 15 min   -PB        User Key  (r) = Recorded By, (t) = Taken By, (c) = Cosigned By    Initials Name Provider Type    MARIA G Solano PT Physical Therapist                PT OP Goals       03/22/17 1100       PT Short Term Goals    STG Date to Achieve 03/24/17  -PB     STG 1 Patient will report decreased pain rating from current 7/10 to 5/10 average   -PB     STG 1 Progress Progressing  -PB     STG 1 Progress Comments Average 6/10   -PB     STG 2 Patient will increase L shoulder AROM symmetrical to R shoulder without increased pain  above 3/10   -PB     STG 2 Progress Progressing  -PB     STG 2 Progress Comments L shoulder AROM near symmetrical with limited L elbow AROM limiting ER behind his head   -PB     STG 3 Patient will report no increase in ear ringing with cervical AROM   -PB     STG 3 Progress Ongoing  -PB     STG 3 Progress Comments Cervical AROM 75% with stiffness turning head to the L and side bending to the L   -PB     Long Term Goals    LTG Date to Achieve 04/14/17  -PB     LTG 1 Patient will report decreased pain rating to 4/10 or less   -PB     LTG 1 Progress Ongoing  -PB     LTG 2 Patient will demonstrate increased L shoulder strength to 4+/5 without pain   -PB     LTG 2 Progress Progressing  -PB     LTG 2 Progress Comments Flexion 4, Abd 4, ER 4+   -PB     LTG 3 Patient will perform painfree reaching with L shoulder   -PB     LTG 3 Progress Progressing  -PB     LTG 4 Patient will report minimal to no ringing in ears   -PB     LTG 4 Progress Ongoing  -PB     LTG 5 Patient will resume near normal walking in regular shoes   -PB     LTG 5 Progress Progressing  -PB     LTG 5 Progress Comments Patient walking in street shoes for 3rd day with antalgic gait showing lateral sway   -PB       User Key  (r) = Recorded By, (t) = Taken By, (c) = Cosigned By    Initials Name Provider Type    PB Susan Solano, PT Physical Therapist                    Time Calculation:   Start Time: 1038  Stop Time: 1120  Time Calculation (min): 42 min    Therapy Charges for Today     Code Description Service Date Service Provider Modifiers Qty    35783302869 HC PT THER PROC EA 15 MIN 3/22/2017 Susan Solano, PT GP 2    16209735101 HC PT MANUAL THERAPY EA 15 MIN 3/22/2017 Susan Solano, PT GP 1                    Susan Solano, PT  3/22/2017

## 2017-03-23 ENCOUNTER — HOSPITAL ENCOUNTER (OUTPATIENT)
Dept: PHYSICAL THERAPY | Facility: HOSPITAL | Age: 62
Setting detail: THERAPIES SERIES
Discharge: HOME | End: 2017-03-23

## 2017-03-23 DIAGNOSIS — S63.502D LEFT WRIST SPRAIN, SUBSEQUENT ENCOUNTER: ICD-10-CM

## 2017-03-23 DIAGNOSIS — S92.215D CLOSED NONDISPLACED FRACTURE OF CUBOID OF LEFT FOOT WITH ROUTINE HEALING, SUBSEQUENT ENCOUNTER: ICD-10-CM

## 2017-03-23 DIAGNOSIS — S83.92XD SPRAIN OF LEFT KNEE, UNSPECIFIED LIGAMENT, SUBSEQUENT ENCOUNTER: ICD-10-CM

## 2017-03-23 DIAGNOSIS — M25.512 ACUTE PAIN OF LEFT SHOULDER: ICD-10-CM

## 2017-03-23 DIAGNOSIS — M54.50 ACUTE BILATERAL LOW BACK PAIN WITHOUT SCIATICA: ICD-10-CM

## 2017-03-23 DIAGNOSIS — M54.2 CERVICAL PAIN: Primary | ICD-10-CM

## 2017-03-28 ENCOUNTER — HOSPITAL ENCOUNTER (OUTPATIENT)
Dept: PHYSICAL THERAPY | Facility: HOSPITAL | Age: 62
Setting detail: THERAPIES SERIES
Discharge: HOME | End: 2017-03-28

## 2017-03-28 DIAGNOSIS — M54.2 CERVICAL PAIN: Primary | ICD-10-CM

## 2017-03-28 DIAGNOSIS — S63.502D LEFT WRIST SPRAIN, SUBSEQUENT ENCOUNTER: ICD-10-CM

## 2017-03-28 DIAGNOSIS — S92.215D CLOSED NONDISPLACED FRACTURE OF CUBOID OF LEFT FOOT WITH ROUTINE HEALING, SUBSEQUENT ENCOUNTER: ICD-10-CM

## 2017-03-28 DIAGNOSIS — M25.512 ACUTE PAIN OF LEFT SHOULDER: ICD-10-CM

## 2017-03-28 DIAGNOSIS — S83.92XD SPRAIN OF LEFT KNEE, UNSPECIFIED LIGAMENT, SUBSEQUENT ENCOUNTER: ICD-10-CM

## 2017-03-28 DIAGNOSIS — M54.50 ACUTE BILATERAL LOW BACK PAIN WITHOUT SCIATICA: ICD-10-CM

## 2017-03-28 PROCEDURE — 97113 AQUATIC THERAPY/EXERCISES: CPT | Performed by: PHYSICAL THERAPIST

## 2017-03-28 NOTE — PROGRESS NOTES
"    Outpatient Physical Therapy Ortho Treatment Note  [facility]     Patient Name: Maik Lara  : 1955  MRN: [id number]  Today's Date: 3/28/2017      Visit Date: 2017    Visit Dx:    ICD-10-CM ICD-9-CM   1. Cervical pain M54.2 723.1   2. Acute pain of left shoulder M25.512 719.41   3. Acute bilateral low back pain without sciatica M54.5 724.2     338.19   4. Sprain of left knee, unspecified ligament, subsequent encounter S83.92XD 844.9   5. Left wrist sprain, subsequent encounter S63.502D V58.89     842.00   6. Closed nondisplaced fracture of cuboid of left foot with routine healing, subsequent encounter S92.215D V54.19       There is no problem list on file for this patient.       Past Medical History:   Diagnosis Date   • Allergic rhinitis    • Anxiety    • Arthritis    • Cholecystitis    • Cholelithiasis    • Chronic pain    • Depression    • Hypertension    • Rhabdomyolysis    • Sleep apnea         Past Surgical History:   Procedure Laterality Date   • ANAL FISTULOTOMY     • FOOT SURGERY Right                              PT Assessment/Plan       17 1050       PT Assessment    Assessment Comments Used more focused stretching in pool today for lower back and hips with some strain R side lower back. Patient reports no change in ringing in his ears and plans on going to ENT office to personally schedule appt   -PB       User Key  (r) = Recorded By, (t) = Taken By, (c) = Cosigned By    Initials Name Provider Type    MARIA G Solano, PT Physical Therapist                    Exercises       17 1000          Subjective Comments    Subjective Comments Back and hip are tight  -PB      Subjective Pain    Able to rate subjective pain? yes  -PB      Pre-Treatment Pain Level 6  -PB      Post-Treatment Pain Level 5  -PB      Aquatics    Aquatics performed? Yes  -PB      Aquatics LE    Water Walk forward;side   x 3  -PB      Stretch 1 Hip adductors 30\"/2  -PB      Stretch 2 HS LN x 30\"/2 and wall " "30\"/2  -PB      Stretch 3 Piriformis standing x 30 sec  -PB      Stretch Other 1 Lower back at wall several times   -PB      Stretch Other 2 Quad SR 30\"/2 and calf 30\"/2  -PB      Abdominals --   LN x 20  -PB      Clams Hip AROM IR/ER  -PB      Aquatics UE    Scap Retraction/Row BP L5 x 20 B x 20 Alt  -PB      External Rotation @ 0 BP L5 x q0  -PB      I's/T's/Y's T BP L5 x 20  -PB      Abdominals Pelvic tilt with trunk rotation with noodle x 10 B  -PB        User Key  (r) = Recorded By, (t) = Taken By, (c) = Cosigned By    Initials Name Provider Type    PB Susan Solano, PT Physical Therapist                                       Time Calculation:   Start Time: 0950  Stop Time: 1035  Time Calculation (min): 45 min    Therapy Charges for Today     Code Description Service Date Service Provider Modifiers Qty    94608671441 HC PT AQUATIC THERAPY EA 15 MIN 3/28/2017 Susan Solano, PT GP 3                    Susan Solano, PT  3/28/2017     "

## 2017-03-30 ENCOUNTER — HOSPITAL ENCOUNTER (OUTPATIENT)
Dept: PHYSICAL THERAPY | Facility: HOSPITAL | Age: 62
Setting detail: THERAPIES SERIES
Discharge: HOME | End: 2017-03-30

## 2017-03-30 DIAGNOSIS — M54.50 ACUTE BILATERAL LOW BACK PAIN WITHOUT SCIATICA: ICD-10-CM

## 2017-03-30 DIAGNOSIS — S83.92XD SPRAIN OF LEFT KNEE, UNSPECIFIED LIGAMENT, SUBSEQUENT ENCOUNTER: ICD-10-CM

## 2017-03-30 DIAGNOSIS — S63.502D LEFT WRIST SPRAIN, SUBSEQUENT ENCOUNTER: ICD-10-CM

## 2017-03-30 DIAGNOSIS — S92.215D CLOSED NONDISPLACED FRACTURE OF CUBOID OF LEFT FOOT WITH ROUTINE HEALING, SUBSEQUENT ENCOUNTER: ICD-10-CM

## 2017-03-30 DIAGNOSIS — M25.512 ACUTE PAIN OF LEFT SHOULDER: ICD-10-CM

## 2017-03-30 DIAGNOSIS — M54.2 CERVICAL PAIN: Primary | ICD-10-CM

## 2017-03-30 PROCEDURE — 97113 AQUATIC THERAPY/EXERCISES: CPT | Performed by: PHYSICAL THERAPIST

## 2017-03-30 NOTE — PROGRESS NOTES
"    Outpatient Physical Therapy Ortho Treatment Note  [facility]     Patient Name: Maik Lara  : 1955  MRN: [id number]  Today's Date: 3/30/2017      Visit Date: 2017    Visit Dx:    ICD-10-CM ICD-9-CM   1. Cervical pain M54.2 723.1   2. Acute pain of left shoulder M25.512 719.41   3. Acute bilateral low back pain without sciatica M54.5 724.2     338.19   4. Sprain of left knee, unspecified ligament, subsequent encounter S83.92XD 844.9   5. Left wrist sprain, subsequent encounter S63.502D V58.89     842.00   6. Closed nondisplaced fracture of cuboid of left foot with routine healing, subsequent encounter S92.215D V54.19       There is no problem list on file for this patient.       Past Medical History:   Diagnosis Date   • Allergic rhinitis    • Anxiety    • Arthritis    • Cholecystitis    • Cholelithiasis    • Chronic pain    • Depression    • Hypertension    • Rhabdomyolysis    • Sleep apnea         Past Surgical History:   Procedure Laterality Date   • ANAL FISTULOTOMY     • FOOT SURGERY Right                              PT Assessment/Plan       17 1643       PT Assessment    Assessment Comments Continued to focus on core muscle strength and back and hip stretching in pool for primary back and hip pain.  -PB       User Key  (r) = Recorded By, (t) = Taken By, (c) = Cosigned By    Initials Name Provider Type    MARIA G Solano, PT Physical Therapist                    Exercises       17 0900          Subjective Comments    Subjective Comments Back and hip sore and stiff.    -PB      Subjective Pain    Able to rate subjective pain? yes  -PB      Pre-Treatment Pain Level 6  -PB      Post-Treatment Pain Level 5  -PB      Aquatics LE    Water Walk forward;side   x 3  -PB      Stretch 1 Hip adductors 30\"/2  -PB      Stretch 2 HS LN x 30\"/2 and wall 30\"/2  -PB      Stretch 3 Piriformis standing x 30 sec  -PB      Stretch Other 1 Lower back at wall several times   -PB      Stretch Other 2 " "Quad SR 30\"/2 and calf 30\"/2  -PB      Abdominals --   LN x 20  -PB      Clams Hip AROM IR/ER  -PB      March in Place Abd brace with march x 20  -PB      Bicycle Rail x 2 min   -PB      Aquatics UE    Scap Retraction/Row BP L5 x 20 B x 20 Alt  -PB      External Rotation @ 0 BP L5 x q0  -PB      I's/T's/Y's T BP L5 x 20  -PB      Abdominals Pelvic tilt with trunk rotation with noodle x 10 B  -PB        User Key  (r) = Recorded By, (t) = Taken By, (c) = Cosigned By    Initials Name Provider Type    PB Susan Solano, PT Physical Therapist                                       Time Calculation:   Start Time: 0946  Stop Time: 1030  Time Calculation (min): 44 min    Therapy Charges for Today     Code Description Service Date Service Provider Modifiers Qty    08791817311 HC PT AQUATIC THERAPY EA 15 MIN 3/30/2017 Susan Solano, PT GP 3                    Susan Solano, PT  3/30/2017     "

## 2017-03-31 ENCOUNTER — HOSPITAL ENCOUNTER (OUTPATIENT)
Dept: PHYSICAL THERAPY | Facility: HOSPITAL | Age: 62
Setting detail: THERAPIES SERIES
Discharge: HOME | End: 2017-03-31

## 2017-03-31 DIAGNOSIS — S83.92XD SPRAIN OF LEFT KNEE, UNSPECIFIED LIGAMENT, SUBSEQUENT ENCOUNTER: ICD-10-CM

## 2017-03-31 DIAGNOSIS — M25.512 ACUTE PAIN OF LEFT SHOULDER: ICD-10-CM

## 2017-03-31 DIAGNOSIS — M54.50 ACUTE BILATERAL LOW BACK PAIN WITHOUT SCIATICA: ICD-10-CM

## 2017-03-31 DIAGNOSIS — S92.215D CLOSED NONDISPLACED FRACTURE OF CUBOID OF LEFT FOOT WITH ROUTINE HEALING, SUBSEQUENT ENCOUNTER: ICD-10-CM

## 2017-03-31 DIAGNOSIS — S63.502D LEFT WRIST SPRAIN, SUBSEQUENT ENCOUNTER: ICD-10-CM

## 2017-03-31 DIAGNOSIS — M54.2 CERVICAL PAIN: Primary | ICD-10-CM

## 2017-03-31 PROCEDURE — 97140 MANUAL THERAPY 1/> REGIONS: CPT | Performed by: PHYSICAL THERAPIST

## 2017-04-03 ENCOUNTER — HOSPITAL ENCOUNTER (OUTPATIENT)
Dept: PHYSICAL THERAPY | Facility: HOSPITAL | Age: 62
Setting detail: THERAPIES SERIES
Discharge: HOME | End: 2017-04-03

## 2017-04-03 DIAGNOSIS — M25.512 ACUTE PAIN OF LEFT SHOULDER: ICD-10-CM

## 2017-04-03 DIAGNOSIS — S83.92XD SPRAIN OF LEFT KNEE, UNSPECIFIED LIGAMENT, SUBSEQUENT ENCOUNTER: ICD-10-CM

## 2017-04-03 DIAGNOSIS — S63.502D LEFT WRIST SPRAIN, SUBSEQUENT ENCOUNTER: ICD-10-CM

## 2017-04-03 DIAGNOSIS — S92.215D CLOSED NONDISPLACED FRACTURE OF CUBOID OF LEFT FOOT WITH ROUTINE HEALING, SUBSEQUENT ENCOUNTER: ICD-10-CM

## 2017-04-03 DIAGNOSIS — M54.50 ACUTE BILATERAL LOW BACK PAIN WITHOUT SCIATICA: ICD-10-CM

## 2017-04-03 DIAGNOSIS — M54.2 CERVICAL PAIN: Primary | ICD-10-CM

## 2017-04-03 PROCEDURE — 97113 AQUATIC THERAPY/EXERCISES: CPT | Performed by: PHYSICAL THERAPIST

## 2017-04-03 NOTE — PROGRESS NOTES
"    Outpatient Physical Therapy Ortho Treatment Note  [facility]     Patient Name: Maik Lara  : 1955  MRN: [id number]  Today's Date: 4/3/2017      Visit Date: 2017    Visit Dx:    ICD-10-CM ICD-9-CM   1. Cervical pain M54.2 723.1   2. Acute pain of left shoulder M25.512 719.41   3. Acute bilateral low back pain without sciatica M54.5 724.2     338.19   4. Sprain of left knee, unspecified ligament, subsequent encounter S83.92XD 844.9   5. Left wrist sprain, subsequent encounter S63.502D V58.89     842.00   6. Closed nondisplaced fracture of cuboid of left foot with routine healing, subsequent encounter S92.215D V54.19       There is no problem list on file for this patient.       Past Medical History:   Diagnosis Date   • Allergic rhinitis    • Anxiety    • Arthritis    • Cholecystitis    • Cholelithiasis    • Chronic pain    • Depression    • Hypertension    • Rhabdomyolysis    • Sleep apnea         Past Surgical History:   Procedure Laterality Date   • ANAL FISTULOTOMY     • FOOT SURGERY Right                              PT Assessment/Plan       17 1320       PT Assessment    Assessment Comments He was able to achieve good hip stretching with adding noodle to hip stretching today   -PB       User Key  (r) = Recorded By, (t) = Taken By, (c) = Cosigned By    Initials Name Provider Type    PB Susan Solano, PT Physical Therapist                    Exercises       17 1300          Subjective Comments    Subjective Comments Strained R hip just now getting out of the truck  -PB      Subjective Pain    Able to rate subjective pain? yes  -PB      Pre-Treatment Pain Level 6  -PB      Post-Treatment Pain Level 5  -PB      Aquatics    Aquatics performed? Yes  -PB      Aquatics LE    Water Walk forward;side   x 3  -PB      Stretch 1 Hip adductors 60\" with LN  -PB      Stretch 2 HS LN x 30\"/2 and wall 30\"/2  -PB      Stretch 3 Piriformis standing x 30 sec x 2 with LN  -PB      Stretch Other 1 " "Upper trunk rotation LN x 10 B  -PB      Stretch Other 2 Quad LN 30\"/2 and calf 30\"/2  -PB      Vertical Traction Deep water   -PB      Abdominals --   LN x 20  -PB      Clams Hip AROM IR/ER  -PB      March in Place Abd brace with march x 20  -PB      Bicycle Rail x 2 min   -PB      Aquatics UE    Scap Retraction/Row BP L5 x 20 B x 20 Alt  -PB      External Rotation @ 0 BP L5 x 20  -PB      I's/T's/Y's T BP L5 x 20  -PB        User Key  (r) = Recorded By, (t) = Taken By, (c) = Cosigned By    Initials Name Provider Type    PB Susan Solano, PT Physical Therapist                                       Time Calculation:   Start Time: 1032  Stop Time: 1116  Time Calculation (min): 44 min    Therapy Charges for Today     Code Description Service Date Service Provider Modifiers Qty    62815655910 HC PT AQUATIC THERAPY EA 15 MIN 4/3/2017 Susan Solano, PT GP 3                    Susan Solano, PT  4/3/2017     "

## 2017-04-04 RX ORDER — OMEPRAZOLE 40 MG/1
40 CAPSULE, DELAYED RELEASE ORAL DAILY
Qty: 90 CAPSULE | Refills: 1 | Status: SHIPPED | OUTPATIENT
Start: 2017-04-04 | End: 2017-09-29 | Stop reason: SDUPTHER

## 2017-04-05 ENCOUNTER — HOSPITAL ENCOUNTER (OUTPATIENT)
Dept: PHYSICAL THERAPY | Facility: HOSPITAL | Age: 62
Setting detail: THERAPIES SERIES
Discharge: HOME | End: 2017-04-05

## 2017-04-05 DIAGNOSIS — S63.502D LEFT WRIST SPRAIN, SUBSEQUENT ENCOUNTER: ICD-10-CM

## 2017-04-05 DIAGNOSIS — M54.2 CERVICAL PAIN: Primary | ICD-10-CM

## 2017-04-05 DIAGNOSIS — M54.50 ACUTE BILATERAL LOW BACK PAIN WITHOUT SCIATICA: ICD-10-CM

## 2017-04-05 DIAGNOSIS — S83.92XD SPRAIN OF LEFT KNEE, UNSPECIFIED LIGAMENT, SUBSEQUENT ENCOUNTER: ICD-10-CM

## 2017-04-05 DIAGNOSIS — S92.215D CLOSED NONDISPLACED FRACTURE OF CUBOID OF LEFT FOOT WITH ROUTINE HEALING, SUBSEQUENT ENCOUNTER: ICD-10-CM

## 2017-04-05 DIAGNOSIS — M25.512 ACUTE PAIN OF LEFT SHOULDER: ICD-10-CM

## 2017-04-05 PROCEDURE — 97140 MANUAL THERAPY 1/> REGIONS: CPT | Performed by: PHYSICAL THERAPIST

## 2017-04-05 NOTE — PROGRESS NOTES
Outpatient Physical Therapy Ortho Treatment Note  [facility]     Patient Name: Maik Lara  : 1955  MRN: [id number]  Today's Date: 2017      Visit Date: 2017    Visit Dx:    ICD-10-CM ICD-9-CM   1. Cervical pain M54.2 723.1   2. Acute pain of left shoulder M25.512 719.41   3. Acute bilateral low back pain without sciatica M54.5 724.2     338.19   4. Sprain of left knee, unspecified ligament, subsequent encounter S83.92XD 844.9   5. Left wrist sprain, subsequent encounter S63.502D V58.89     842.00   6. Closed nondisplaced fracture of cuboid of left foot with routine healing, subsequent encounter S92.215D V54.19       There is no problem list on file for this patient.       Past Medical History:   Diagnosis Date   • Allergic rhinitis    • Anxiety    • Arthritis    • Cholecystitis    • Cholelithiasis    • Chronic pain    • Depression    • Hypertension    • Rhabdomyolysis    • Sleep apnea         Past Surgical History:   Procedure Laterality Date   • ANAL FISTULOTOMY     • FOOT SURGERY Right                              PT Assessment/Plan       17 1001       PT Assessment    Assessment Comments R lower back / hip pain with trunk rotation and R hip rotation such as bending to don/doff shoes.  Cervical spine mobility is improving from last week.   -PB       User Key  (r) = Recorded By, (t) = Taken By, (c) = Cosigned By    Initials Name Provider Type    MARIA G Solano PT Physical Therapist                    Exercises       17 0900          Subjective Comments    Subjective Comments Hip and foot sore from getting up and down from floor to fix threshhold   -PB      Subjective Pain    Able to rate subjective pain? yes  -PB      Pre-Treatment Pain Level 6  -PB      Post-Treatment Pain Level 5  -PB      Aquatics    Aquatics performed? No  -PB        User Key  (r) = Recorded By, (t) = Taken By, (c) = Cosigned By    Initials Name Provider Type    MARIA G Solano PT Physical Therapist                         Manual Rx (last 36 hours)      Manual Treatments       04/05/17 0900          Manual Rx 1    Manual Rx 1 Location Cervical spine   -PB      Manual Rx 1 Type Cervical distraction   -PB      Manual Rx 1 Grade Manual distraction   -PB      Manual Rx 2    Manual Rx 2 Location Cervical spine   -PB      Manual Rx 2 Type Suboccipital release hold   -PB      Manual Rx 3    Manual Rx 3 Location Cervical spine   -PB      Manual Rx 3 Type STM   -PB      Manual Rx 3 Grade Suboccipitals, cervical PVM, SCM with passive stretching SB and rot  -PB      Manual Rx 4    Manual Rx 4 Location Lumbar spine   -PB      Manual Rx 4 Type PA  -PB      Manual Rx 4 Grade Grade II-IV B lumbar spine   -PB      Manual Rx 5    Manual Rx 5 Location Lumbar PVM  -PB      Manual Rx 5 Type STM with passive stretching to R hip and lower trunk  -PB      Manual Rx 5 Duration Total manual time 45 min   -PB        User Key  (r) = Recorded By, (t) = Taken By, (c) = Cosigned By    Initials Name Provider Type    PB Susan Solano, PT Physical Therapist                        Time Calculation:   Start Time: 0907  Stop Time: 0955  Time Calculation (min): 48 min    Therapy Charges for Today     Code Description Service Date Service Provider Modifiers Qty    34324411777 HC PT MANUAL THERAPY EA 15 MIN 4/5/2017 Susan Solano, PT GP 3                    Susan Solano PT  4/5/2017

## 2017-04-10 ENCOUNTER — HOSPITAL ENCOUNTER (OUTPATIENT)
Dept: PHYSICAL THERAPY | Facility: HOSPITAL | Age: 62
Setting detail: THERAPIES SERIES
Discharge: HOME | End: 2017-04-10

## 2017-04-10 DIAGNOSIS — S63.502D LEFT WRIST SPRAIN, SUBSEQUENT ENCOUNTER: ICD-10-CM

## 2017-04-10 DIAGNOSIS — S83.92XD SPRAIN OF LEFT KNEE, UNSPECIFIED LIGAMENT, SUBSEQUENT ENCOUNTER: ICD-10-CM

## 2017-04-10 DIAGNOSIS — M25.512 ACUTE PAIN OF LEFT SHOULDER: ICD-10-CM

## 2017-04-10 DIAGNOSIS — S92.215D CLOSED NONDISPLACED FRACTURE OF CUBOID OF LEFT FOOT WITH ROUTINE HEALING, SUBSEQUENT ENCOUNTER: ICD-10-CM

## 2017-04-10 DIAGNOSIS — M54.50 ACUTE BILATERAL LOW BACK PAIN WITHOUT SCIATICA: ICD-10-CM

## 2017-04-10 DIAGNOSIS — M54.2 CERVICAL PAIN: Primary | ICD-10-CM

## 2017-04-10 PROCEDURE — 97110 THERAPEUTIC EXERCISES: CPT | Performed by: PHYSICAL THERAPIST

## 2017-04-10 PROCEDURE — 97140 MANUAL THERAPY 1/> REGIONS: CPT | Performed by: PHYSICAL THERAPIST

## 2017-04-10 RX ORDER — AMLODIPINE BESYLATE/BENAZEPRIL 10 MG-20MG
CAPSULE ORAL
Qty: 30 CAPSULE | Refills: 0 | Status: SHIPPED | OUTPATIENT
Start: 2017-04-10 | End: 2017-11-02 | Stop reason: SDUPTHER

## 2017-04-10 NOTE — PROGRESS NOTES
Outpatient Physical Therapy Ortho Treatment Note  [facility]     Patient Name: Maik Lara  : 1955  MRN: [id number]  Today's Date: 4/10/2017      Visit Date: 04/10/2017    Visit Dx:    ICD-10-CM ICD-9-CM   1. Cervical pain M54.2 723.1   2. Acute pain of left shoulder M25.512 719.41   3. Acute bilateral low back pain without sciatica M54.5 724.2     338.19   4. Sprain of left knee, unspecified ligament, subsequent encounter S83.92XD 844.9   5. Left wrist sprain, subsequent encounter S63.502D V58.89     842.00   6. Closed nondisplaced fracture of cuboid of left foot with routine healing, subsequent encounter S92.215D V54.19       There is no problem list on file for this patient.       Past Medical History:   Diagnosis Date   • Allergic rhinitis    • Anxiety    • Arthritis    • Cholecystitis    • Cholelithiasis    • Chronic pain    • Depression    • Hypertension    • Rhabdomyolysis    • Sleep apnea         Past Surgical History:   Procedure Laterality Date   • ANAL FISTULOTOMY     • FOOT SURGERY Right                              PT Assessment/Plan       04/10/17 1301       PT Assessment    Assessment Comments Patient has noted limited mobility of R hip joint that might correlate to some arthritis.  This stiffness impacts stiffness at R side of lower back because R hip is more externally rotated / turned outwards. Stiffness in trunk rotation on R side of lower back. Right thoracic PVM have prominant muscle bulge/tightness.   -PB       User Key  (r) = Recorded By, (t) = Taken By, (c) = Cosigned By    Initials Name Provider Type    PB Susan Solano, PT Physical Therapist                    Exercises       04/10/17 1200          Subjective Comments    Subjective Comments Lower back and hip sore from working on floor to repair threshhold   -PB      Subjective Pain    Able to rate subjective pain? yes  -PB      Pre-Treatment Pain Level 6  -PB      Post-Treatment Pain Level 6  -PB      Aquatics    Aquatics  performed? No  -PB      Exercise 1    Exercise Name 1 Land based exercises performed supine with feet on Theraball for lower abd pulls into trunk flexion stretch and LTR; passive stretching performed for B hamstrings, hip rotators and LTR stretching with overpressure to stretching; weighted pulleys orws 20# x 20; pulldowns 20# x 20 and trunk rotation 7.5# x 10 B   -PB      Cueing 1 --   Cues for abdominal bracing   -PB        User Key  (r) = Recorded By, (t) = Taken By, (c) = Cosigned By    Initials Name Provider Type    PB Susan Solano PT Physical Therapist                        Manual Rx (last 36 hours)      Manual Treatments       04/10/17 1300          Manual Rx 4    Manual Rx 4 Location Lumbar spine   -PB      Manual Rx 4 Type PA  -PB      Manual Rx 4 Grade Grade II-IV R lumbar spine   -PB      Manual Rx 5    Manual Rx 5 Location Lumbar PVM  -PB      Manual Rx 5 Type STM with passive stretching to R hip and lower trunk (Guasha tool used for STM of R mid to lower thoracic PVM)   -PB      Manual Rx 5 Duration Total manual time 30 min   -PB        User Key  (r) = Recorded By, (t) = Taken By, (c) = Cosigned By    Initials Name Provider Type    PB Susan Solano PT Physical Therapist                        Time Calculation:   Start Time: 0950  Stop Time: 1030  Time Calculation (min): 40 min    Therapy Charges for Today     Code Description Service Date Service Provider Modifiers Qty    59562098752 HC PT THER PROC EA 15 MIN 4/10/2017 Susan Solano, PT GP 1    38985246749 HC PT MANUAL THERAPY EA 15 MIN 4/10/2017 Susan Solano PT GP 2                    Susan Solano PT  4/10/2017

## 2017-04-13 ENCOUNTER — HOSPITAL ENCOUNTER (OUTPATIENT)
Dept: PHYSICAL THERAPY | Facility: HOSPITAL | Age: 62
Setting detail: THERAPIES SERIES
Discharge: HOME | End: 2017-04-13

## 2017-04-13 DIAGNOSIS — M54.2 CERVICAL PAIN: Primary | ICD-10-CM

## 2017-04-13 DIAGNOSIS — S83.92XD SPRAIN OF LEFT KNEE, UNSPECIFIED LIGAMENT, SUBSEQUENT ENCOUNTER: ICD-10-CM

## 2017-04-13 DIAGNOSIS — M25.512 ACUTE PAIN OF LEFT SHOULDER: ICD-10-CM

## 2017-04-13 DIAGNOSIS — S92.215D CLOSED NONDISPLACED FRACTURE OF CUBOID OF LEFT FOOT WITH ROUTINE HEALING, SUBSEQUENT ENCOUNTER: ICD-10-CM

## 2017-04-13 DIAGNOSIS — S63.502D LEFT WRIST SPRAIN, SUBSEQUENT ENCOUNTER: ICD-10-CM

## 2017-04-13 DIAGNOSIS — M54.50 ACUTE BILATERAL LOW BACK PAIN WITHOUT SCIATICA: ICD-10-CM

## 2017-04-13 PROCEDURE — 97113 AQUATIC THERAPY/EXERCISES: CPT | Performed by: PHYSICAL THERAPIST

## 2017-04-13 NOTE — PROGRESS NOTES
"    Outpatient Physical Therapy Ortho Treatment Note  [facility]     Patient Name: Maik Lara  : 1955  MRN: [id number]  Today's Date: 2017      Visit Date: 2017    Visit Dx:    ICD-10-CM ICD-9-CM   1. Cervical pain M54.2 723.1   2. Acute pain of left shoulder M25.512 719.41   3. Acute bilateral low back pain without sciatica M54.5 724.2     338.19   4. Sprain of left knee, unspecified ligament, subsequent encounter S83.92XD 844.9   5. Left wrist sprain, subsequent encounter S63.502D V58.89     842.00   6. Closed nondisplaced fracture of cuboid of left foot with routine healing, subsequent encounter S92.215D V54.19       There is no problem list on file for this patient.       Past Medical History:   Diagnosis Date   • Allergic rhinitis    • Anxiety    • Arthritis    • Cholecystitis    • Cholelithiasis    • Chronic pain    • Depression    • Hypertension    • Rhabdomyolysis    • Sleep apnea         Past Surgical History:   Procedure Laterality Date   • ANAL FISTULOTOMY     • FOOT SURGERY Right                              PT Assessment/Plan       17 1230       PT Assessment    Assessment Comments With more stretching and rest at home and with help from Tylenol patient feels reduced pain in neck, lower back and right hip to 4/10 seeing improvement   -PB       User Key  (r) = Recorded By, (t) = Taken By, (c) = Cosigned By    Initials Name Provider Type    MARIA G Solano, PT Physical Therapist                    Exercises       17 1200          Subjective Comments    Subjective Comments I am doing better today but I did take a Tylenol  -PB      Subjective Pain    Able to rate subjective pain? yes  -PB      Pre-Treatment Pain Level 4  -PB      Post-Treatment Pain Level 4  -PB      Aquatics    Aquatics performed? Yes  -PB      Aquatics LE    Water Walk forward;side   x 3  -PB      Stretch 1 Hip adductors 60\" with LN  -PB      Stretch 2 HS SN x 30\"/2 and wall 30\"/2  -PB      Stretch 3 " Piriformis standing x 30 sec x 2 with SN  -PB      Stretch Other 1 Upper trunk rotation LN x 10 B  -PB      Vertical Traction Deep water   -PB      Abdominals --   LN x 20  -PB      Bicycle Rail with LN x 2 min   -PB      Aquatics UE    Stretch 1 Trunk rotation 1 BP L5 x 10 B   -PB      Scap Retraction/Row BP L5 x 20 B x 20 Alt  -PB      External Rotation @ 0 BP L5 x 20  -PB      I's/T's/Y's T BP L5 x 20  -PB      Abdominals Pot stir L5 x 10/2  -PB        User Key  (r) = Recorded By, (t) = Taken By, (c) = Cosigned By    Initials Name Provider Type    PB Susan Solnao, PT Physical Therapist                                       Time Calculation:   Start Time: 0950  Stop Time: 1030  Time Calculation (min): 40 min    Therapy Charges for Today     Code Description Service Date Service Provider Modifiers Qty    00495164059 HC PT AQUATIC THERAPY EA 15 MIN 4/13/2017 Susan Solano, PT GP 3                    Susan Solano, PT  4/13/2017

## 2017-04-14 RX ORDER — ZOLPIDEM TARTRATE 10 MG/1
TABLET ORAL
Qty: 30 TABLET | Refills: 0 | OUTPATIENT
Start: 2017-04-14 | End: 2017-05-15 | Stop reason: SDUPTHER

## 2017-04-17 ENCOUNTER — HOSPITAL ENCOUNTER (OUTPATIENT)
Dept: PHYSICAL THERAPY | Facility: HOSPITAL | Age: 62
Setting detail: THERAPIES SERIES
Discharge: HOME | End: 2017-04-17

## 2017-04-17 DIAGNOSIS — S83.92XD SPRAIN OF LEFT KNEE, UNSPECIFIED LIGAMENT, SUBSEQUENT ENCOUNTER: ICD-10-CM

## 2017-04-17 DIAGNOSIS — M54.50 ACUTE BILATERAL LOW BACK PAIN WITHOUT SCIATICA: ICD-10-CM

## 2017-04-17 DIAGNOSIS — S63.502D LEFT WRIST SPRAIN, SUBSEQUENT ENCOUNTER: ICD-10-CM

## 2017-04-17 DIAGNOSIS — M54.2 CERVICAL PAIN: Primary | ICD-10-CM

## 2017-04-17 DIAGNOSIS — S92.215D CLOSED NONDISPLACED FRACTURE OF CUBOID OF LEFT FOOT WITH ROUTINE HEALING, SUBSEQUENT ENCOUNTER: ICD-10-CM

## 2017-04-17 DIAGNOSIS — M25.512 ACUTE PAIN OF LEFT SHOULDER: ICD-10-CM

## 2017-04-17 PROCEDURE — 97140 MANUAL THERAPY 1/> REGIONS: CPT | Performed by: PHYSICAL THERAPIST

## 2017-04-17 NOTE — PROGRESS NOTES
Outpatient Physical Therapy Ortho Treatment Note  [facility]     Patient Name: Maik Lara  : 1955  MRN: [id number]  Today's Date: 2017      Visit Date: 2017    Visit Dx:    ICD-10-CM ICD-9-CM   1. Cervical pain M54.2 723.1   2. Acute pain of left shoulder M25.512 719.41   3. Acute bilateral low back pain without sciatica M54.5 724.2     338.19   4. Sprain of left knee, unspecified ligament, subsequent encounter S83.92XD 844.9   5. Left wrist sprain, subsequent encounter S63.502D V58.89     842.00   6. Closed nondisplaced fracture of cuboid of left foot with routine healing, subsequent encounter S92.215D V54.19       There is no problem list on file for this patient.       Past Medical History:   Diagnosis Date   • Allergic rhinitis    • Anxiety    • Arthritis    • Cholecystitis    • Cholelithiasis    • Chronic pain    • Depression    • Hypertension    • Rhabdomyolysis    • Sleep apnea         Past Surgical History:   Procedure Laterality Date   • ANAL FISTULOTOMY     • FOOT SURGERY Right                              PT Assessment/Plan       17 1323       PT Assessment    Assessment Comments Patient more painful to cervical spine/R shoulder and R lower back/hip today with B mid cervical stiffness and R lower lumbar/ R hip stiffness. Patient encouraged to be diligent with HEP for stretching and R shoulder strengthening.  -PB     PT Plan    PT Plan Comments Patient added aquatic land walking after last visit that he may continue with   -PB       User Key  (r) = Recorded By, (t) = Taken By, (c) = Cosigned By    Initials Name Provider Type    PB Susan Solano, PT Physical Therapist                    Exercises       17 0900          Subjective Comments    Subjective Comments Feeling tense, have not rested well the past few nights.   -PB      Subjective Pain    Able to rate subjective pain? yes  -PB      Pre-Treatment Pain Level 6  -PB      Post-Treatment Pain Level 6  -PB       Aquatics    Aquatics performed? No  -PB      Exercise 1    Exercise Name 1 Passive stretching to R hamstrings, hip rotators and LTR   -PB        User Key  (r) = Recorded By, (t) = Taken By, (c) = Cosigned By    Initials Name Provider Type    PB Susan Solano, PT Physical Therapist                        Manual Rx (last 36 hours)      Manual Treatments       04/17/17 1300          Manual Rx 1    Manual Rx 1 Location Cervical spine   -PB      Manual Rx 1 Type Cervical distraction   -PB      Manual Rx 1 Grade Manual distraction   -PB      Manual Rx 2    Manual Rx 2 Location Cervical spine   -PB      Manual Rx 2 Type Suboccipital release hold   -PB      Manual Rx 3    Manual Rx 3 Location Cervical spine   -PB      Manual Rx 3 Type STM   -PB      Manual Rx 3 Grade Suboccipitals, cervical PVM, SCM with passive stretching SB and rot  -PB      Manual Rx 4    Manual Rx 4 Location Lumbar spine   -PB      Manual Rx 4 Type PA  -PB      Manual Rx 4 Grade Grade II-IV R lumbar spine   -PB      Manual Rx 5    Manual Rx 5 Location Lumbar PVM  -PB      Manual Rx 5 Type STM with passive stretching to R hip and lower trunk  -PB      Manual Rx 5 Duration Total manual time 40 min   -PB        User Key  (r) = Recorded By, (t) = Taken By, (c) = Cosigned By    Initials Name Provider Type    PB Susan Solano PT Physical Therapist                        Time Calculation:   Start Time: 0952  Stop Time: 1032  Time Calculation (min): 40 min    Therapy Charges for Today     Code Description Service Date Service Provider Modifiers Qty    96394481014  PT MANUAL THERAPY EA 15 MIN 4/17/2017 Susan Solano, PT GP 3                    Susan Solano PT  4/17/2017

## 2017-04-20 ENCOUNTER — HOSPITAL ENCOUNTER (OUTPATIENT)
Dept: PHYSICAL THERAPY | Facility: HOSPITAL | Age: 62
Setting detail: THERAPIES SERIES
Discharge: HOME | End: 2017-04-20

## 2017-04-20 ENCOUNTER — TELEPHONE (OUTPATIENT)
Dept: FAMILY MEDICINE CLINIC | Facility: CLINIC | Age: 62
End: 2017-04-20

## 2017-04-20 DIAGNOSIS — M25.512 ACUTE PAIN OF LEFT SHOULDER: ICD-10-CM

## 2017-04-20 DIAGNOSIS — M54.2 CERVICAL PAIN: Primary | ICD-10-CM

## 2017-04-20 DIAGNOSIS — S92.215D CLOSED NONDISPLACED FRACTURE OF CUBOID OF LEFT FOOT WITH ROUTINE HEALING, SUBSEQUENT ENCOUNTER: ICD-10-CM

## 2017-04-20 DIAGNOSIS — M54.50 ACUTE BILATERAL LOW BACK PAIN WITHOUT SCIATICA: ICD-10-CM

## 2017-04-20 DIAGNOSIS — S83.92XD SPRAIN OF LEFT KNEE, UNSPECIFIED LIGAMENT, SUBSEQUENT ENCOUNTER: ICD-10-CM

## 2017-04-20 DIAGNOSIS — S63.502D LEFT WRIST SPRAIN, SUBSEQUENT ENCOUNTER: ICD-10-CM

## 2017-04-20 PROCEDURE — 97113 AQUATIC THERAPY/EXERCISES: CPT | Performed by: PHYSICAL THERAPIST

## 2017-04-20 RX ORDER — CITALOPRAM 40 MG/1
40 TABLET ORAL DAILY
Qty: 90 TABLET | Refills: 3 | Status: SHIPPED | OUTPATIENT
Start: 2017-04-20 | End: 2018-03-26 | Stop reason: SDUPTHER

## 2017-04-20 NOTE — PROGRESS NOTES
"    Outpatient Physical Therapy Ortho Treatment Note  [facility]     Patient Name: Maik Lara  : 1955  MRN: [id number]  Today's Date: 2017      Visit Date: 2017    Visit Dx:    ICD-10-CM ICD-9-CM   1. Cervical pain M54.2 723.1   2. Acute pain of left shoulder M25.512 719.41   3. Acute bilateral low back pain without sciatica M54.5 724.2     338.19   4. Sprain of left knee, unspecified ligament, subsequent encounter S83.92XD 844.9   5. Left wrist sprain, subsequent encounter S63.502D V58.89     842.00   6. Closed nondisplaced fracture of cuboid of left foot with routine healing, subsequent encounter S92.215D V54.19       There is no problem list on file for this patient.       Past Medical History:   Diagnosis Date   • Allergic rhinitis    • Anxiety    • Arthritis    • Cholecystitis    • Cholelithiasis    • Chronic pain    • Depression    • Hypertension    • Rhabdomyolysis    • Sleep apnea         Past Surgical History:   Procedure Laterality Date   • ANAL FISTULOTOMY     • FOOT SURGERY Right                              PT Assessment/Plan       17 0953       PT Assessment    Assessment Comments Patient indicates positive response to therapy this time time around which is encouraging (states not much benefit from prior PT in past 8-9 years.)  He is progressing with UE/core resistance ex and LE flexibility.  Patient stayed in water for 1+ hour after session doing some things on his own including walking in lap pool.  -RA     PT Plan    PT Plan Comments Continue with aquatic and land PT  -RA       User Key  (r) = Recorded By, (t) = Taken By, (c) = Cosigned By    Initials Name Provider Type    MONIQUE Douglass, PT Physical Therapist                    Exercises       17 0900          Subjective Comments    Subjective Comments Trouble sleeping last night 2/2 R hip \"nerve\" pain, was up/down a lot which is not normal for me.  Can definitely tell threapy is helping this time around.   -RA " "     Subjective Pain    Able to rate subjective pain? yes  -RA      Pre-Treatment Pain Level 7  -RA      Post-Treatment Pain Level 6  -RA      Subjective Pain Comment R hip 6-7/10, neck/back 5/10  -RA      Aquatics    Aquatics performed? Yes  -RA      Aquatics LE    Water Walk forward;side   x 3  -RA      Stretch 1 Hip adductors 60\" with LN  -RA      Stretch 2 HS LN x 30\"/2 and wall 30\"/2  -RA      Stretch 3 Piriformis standing x 30 sec x 2 with LN  -RA      Stretch Other 1 Upper trunk rotation LN x 10 B  -RA      Stretch Other 2 Quad LN 30\"/2 and calf 30\"/2  -RA      Vertical Traction Deep water   -RA      Abdominals noodle   LN x 20  -RA      Bicycle Rail with LN x 2 min   -RA      Aquatics UE    Stretch 1 Trunk rotation 1 BP L5 x 10 B   -RA      Scap Retraction/Row BP L5 x 20 B x 20 Alt  -RA      External Rotation @ 0 BP L5 x 20  -RA      I's/T's/Y's T BP L5 x 20  -RA      Abdominals Pot stir L5 (holding 2 paddles) x 10/2  -RA        User Key  (r) = Recorded By, (t) = Taken By, (c) = Cosigned By    Initials Name Provider Type    RA Chantel Douglass PT Physical Therapist                                   Therapy Education       04/20/17 0953          Therapy Education    Given HEP;Symptoms/condition management;Pain management;Posture/body mechanics  -RA      Program Reinforced  -RA      How Provided Verbal;Demonstration  -RA      Provided to Patient  -RA      Level of Understanding Teach back education performed  -RA        User Key  (r) = Recorded By, (t) = Taken By, (c) = Cosigned By    Initials Name Provider Type    RA Chantel Douglass PT Physical Therapist                Time Calculation:   Start Time: 0950  Stop Time: 1030  Time Calculation (min): 40 min    Therapy Charges for Today     Code Description Service Date Service Provider Modifiers Qty    28586312254  PT AQUATIC THERAPY EA 15 MIN 4/20/2017 Chantel Douglass PT GP 3                    Chantel Douglass PT  4/20/2017     "

## 2017-04-20 NOTE — TELEPHONE ENCOUNTER
Ok to change citalopram 20 2 qd to citalopram 40 qd    ----- Message from Kristen La sent at 4/19/2017  1:20 PM EDT -----  Contact: [facility] [phone]  QUESTION ABOUT CITALOPRAM RX. REF # [id number]

## 2017-04-24 ENCOUNTER — HOSPITAL ENCOUNTER (OUTPATIENT)
Dept: PHYSICAL THERAPY | Facility: HOSPITAL | Age: 62
Setting detail: THERAPIES SERIES
Discharge: HOME | End: 2017-04-24

## 2017-04-24 DIAGNOSIS — S83.92XD SPRAIN OF LEFT KNEE, UNSPECIFIED LIGAMENT, SUBSEQUENT ENCOUNTER: ICD-10-CM

## 2017-04-24 DIAGNOSIS — M25.512 ACUTE PAIN OF LEFT SHOULDER: ICD-10-CM

## 2017-04-24 DIAGNOSIS — S63.502D LEFT WRIST SPRAIN, SUBSEQUENT ENCOUNTER: ICD-10-CM

## 2017-04-24 DIAGNOSIS — S92.215D CLOSED NONDISPLACED FRACTURE OF CUBOID OF LEFT FOOT WITH ROUTINE HEALING, SUBSEQUENT ENCOUNTER: ICD-10-CM

## 2017-04-24 DIAGNOSIS — M54.2 CERVICAL PAIN: Primary | ICD-10-CM

## 2017-04-24 DIAGNOSIS — M54.50 ACUTE BILATERAL LOW BACK PAIN WITHOUT SCIATICA: ICD-10-CM

## 2017-04-24 PROCEDURE — 97140 MANUAL THERAPY 1/> REGIONS: CPT | Performed by: PHYSICAL THERAPIST

## 2017-04-24 PROCEDURE — 97110 THERAPEUTIC EXERCISES: CPT | Performed by: PHYSICAL THERAPIST

## 2017-04-24 NOTE — PROGRESS NOTES
Outpatient Physical Therapy Ortho Treatment Note  [facility]     Patient Name: Maik Marco  : 1955  MRN: [id number]  Today's Date: 2017      Visit Date: 2017    Visit Dx:    ICD-10-CM ICD-9-CM   1. Cervical pain M54.2 723.1   2. Acute pain of left shoulder M25.512 719.41   3. Acute bilateral low back pain without sciatica M54.5 724.2     338.19   4. Sprain of left knee, unspecified ligament, subsequent encounter S83.92XD 844.9   5. Left wrist sprain, subsequent encounter S63.502D V58.89     842.00   6. Closed nondisplaced fracture of cuboid of left foot with routine healing, subsequent encounter S92.215D V54.19       There is no problem list on file for this patient.       Past Medical History:   Diagnosis Date   • Allergic rhinitis    • Anxiety    • Arthritis    • Cholecystitis    • Cholelithiasis    • Chronic pain    • Depression    • Hypertension    • Rhabdomyolysis    • Sleep apnea         Past Surgical History:   Procedure Laterality Date   • ANAL FISTULOTOMY     • FOOT SURGERY Right                              PT Assessment/Plan       17 1221       PT Assessment    Assessment Comments Maik continues to report improvement in his symptoms other than the ringing in his ears that is waiting on ENT evaluation appointment.  He does have R cervical joint sitiffness that causes R cervical pain with R side bending, taut and puffy R upper cervical PVM with tenderness and cervical AROM stiffness. L shoulder is moving better with some mild weakness and R lower back / hip continues to improve. He is not without pain but with secondary rhabdomyolosis PT has been helpful to him.  I am concerned about his insurance status for continuing PT past this week until he knows more from insurance and has ears examined.  He is considering joining [employer] for use of [facility].   -PB     PT Plan    PT Plan Comments Continues last scheduled appt in [facility] this week   -PB       User Key  (r) = Recorded By, (t) =  Taken By, (c) = Cosigned By    Initials Name Provider Type    MARIA G Solano PT Physical Therapist                    Exercises       04/24/17 1200          Subjective Comments    Subjective Comments New pain R 3rd toe stub and ball of foot.  Have not had that pain in years.  Neck stiff with ringing increased in ears moving head.  Had to reschedule ENT appointment.  Not able to rate pain numerically but can say pain is less.   -PB      Subjective Pain    Able to rate subjective pain? yes  -PB      Pre-Treatment Pain Level 5  -PB      Post-Treatment Pain Level 5  -PB      Subjective Pain Comment 5/10 estimate based on pain is less   -PB      Aquatics    Aquatics performed? No  -PB      Exercise 1    Exercise Name 1 Examined R foot where he is having new pain at 3rd toe and ball of foot without palpable tenderness. Discussed shoe wear.  Discussed that he is performing HEP.   -PB        User Key  (r) = Recorded By, (t) = Taken By, (c) = Cosigned By    Initials Name Provider Type    MARIA G Solano PT Physical Therapist                        Manual Rx (last 36 hours)      Manual Treatments       04/24/17 1100          Manual Rx 1    Manual Rx 1 Location Cervical spine   -PB      Manual Rx 1 Type Cervical distraction   -PB      Manual Rx 1 Grade Manual distraction   -PB      Manual Rx 2    Manual Rx 2 Location Cervical spine   -PB      Manual Rx 2 Type Suboccipital release hold   -PB      Manual Rx 3    Manual Rx 3 Location Cervical spine   -PB      Manual Rx 3 Type STM   -PB      Manual Rx 3 Grade Suboccipitals, cervical PVM, SCM with passive stretching SB and rot  -PB        User Key  (r) = Recorded By, (t) = Taken By, (c) = Cosigned By    Initials Name Provider Type    MARIA G Solano PT Physical Therapist                PT OP Goals       04/24/17 1000       PT Short Term Goals    STG Date to Achieve 03/24/17  -PB     STG 1 Patient will report decreased pain rating from current 7/10 to 5/10 average   -PB      STG 1 Progress Met  -PB     STG 2 Patient will increase L shoulder AROM symmetrical to R shoulder without increased pain above 3/10   -PB     STG 2 Progress Met  -PB     STG 2 Progress Comments L AROM Flex 140, Abd 142, ER 66, IR L L4  -PB     STG 3 Patient will report no increase in ear ringing with cervical AROM   -PB     STG 3 Progress Ongoing  -PB     STG 3 Progress Comments Patient reports no change in ear ringing with cervical AROM  -PB     Long Term Goals    LTG Date to Achieve 04/14/17  -PB     LTG 1 Patient will report decreased pain rating to 4/10 or less   -PB     LTG 1 Progress Ongoing  -PB     LTG 1 Progress Comments Patient reports pain is steadily decreasing   -PB     LTG 2 Patient will demonstrate increased L shoulder strength to 4+/5 without pain   -PB     LTG 2 Progress Partially Met  -PB     LTG 2 Progress Comments Flex 4+, Abd 4, ER 4, IR 5   -PB     LTG 3 Patient will perform painfree reaching with L shoulder   -PB     LTG 3 Progress Met  -PB     LTG 4 Patient will report minimal to no ringing in ears   -PB     LTG 4 Progress Ongoing  -PB     LTG 5 Patient will resume near normal walking in regular shoes   -PB     LTG 5 Progress Met  -PB       User Key  (r) = Recorded By, (t) = Taken By, (c) = Cosigned By    Initials Name Provider Type    PB Susan Solano, PT Physical Therapist                    Time Calculation:   Start Time: 0952  Stop Time: 1032  Time Calculation (min): 40 min    Therapy Charges for Today     Code Description Service Date Service Provider Modifiers Qty    01519239322  PT THER PROC EA 15 MIN 4/24/2017 Susan Solano, PT GP 1    [id number]  PT MANUAL THERAPY EA 15 MIN 4/24/2017 Susan Solano, PT GP 2                    Susan Solano, PT  4/24/2017

## 2017-04-27 ENCOUNTER — HOSPITAL ENCOUNTER (OUTPATIENT)
Dept: PHYSICAL THERAPY | Facility: HOSPITAL | Age: 62
Setting detail: THERAPIES SERIES
End: 2017-04-27

## 2017-05-15 RX ORDER — ZOLPIDEM TARTRATE 10 MG/1
TABLET ORAL
Qty: 30 TABLET | Refills: 0 | OUTPATIENT
Start: 2017-05-15 | End: 2017-06-15 | Stop reason: SDUPTHER

## 2017-05-17 ENCOUNTER — HOSPITAL ENCOUNTER (OUTPATIENT)
Dept: PHYSICAL THERAPY | Facility: HOSPITAL | Age: 62
Setting detail: THERAPIES SERIES
Discharge: HOME | End: 2017-05-17

## 2017-05-17 DIAGNOSIS — M54.2 CERVICAL PAIN: Primary | ICD-10-CM

## 2017-05-17 DIAGNOSIS — M25.512 ACUTE PAIN OF LEFT SHOULDER: ICD-10-CM

## 2017-05-17 DIAGNOSIS — M54.50 ACUTE BILATERAL LOW BACK PAIN WITHOUT SCIATICA: ICD-10-CM

## 2017-05-17 PROCEDURE — 97110 THERAPEUTIC EXERCISES: CPT | Performed by: PHYSICAL THERAPIST

## 2017-05-17 PROCEDURE — 97140 MANUAL THERAPY 1/> REGIONS: CPT | Performed by: PHYSICAL THERAPIST

## 2017-05-18 ENCOUNTER — APPOINTMENT (OUTPATIENT)
Dept: PHYSICAL THERAPY | Facility: HOSPITAL | Age: 62
End: 2017-05-18

## 2017-05-23 ENCOUNTER — HOSPITAL ENCOUNTER (OUTPATIENT)
Dept: PHYSICAL THERAPY | Facility: HOSPITAL | Age: 62
Setting detail: THERAPIES SERIES
End: 2017-05-23

## 2017-05-24 ENCOUNTER — HOSPITAL ENCOUNTER (OUTPATIENT)
Dept: PHYSICAL THERAPY | Facility: HOSPITAL | Age: 62
Setting detail: THERAPIES SERIES
Discharge: HOME | End: 2017-05-24

## 2017-05-24 DIAGNOSIS — M25.512 ACUTE PAIN OF LEFT SHOULDER: ICD-10-CM

## 2017-05-24 DIAGNOSIS — M54.2 CERVICAL PAIN: Primary | ICD-10-CM

## 2017-05-24 DIAGNOSIS — M54.50 ACUTE BILATERAL LOW BACK PAIN WITHOUT SCIATICA: ICD-10-CM

## 2017-05-24 PROCEDURE — 97110 THERAPEUTIC EXERCISES: CPT | Performed by: PHYSICAL THERAPIST

## 2017-05-30 ENCOUNTER — HOSPITAL ENCOUNTER (OUTPATIENT)
Dept: PHYSICAL THERAPY | Facility: HOSPITAL | Age: 62
Setting detail: THERAPIES SERIES
Discharge: HOME | End: 2017-05-30

## 2017-05-30 DIAGNOSIS — M25.512 ACUTE PAIN OF LEFT SHOULDER: ICD-10-CM

## 2017-05-30 DIAGNOSIS — M54.50 ACUTE BILATERAL LOW BACK PAIN WITHOUT SCIATICA: ICD-10-CM

## 2017-05-30 DIAGNOSIS — M54.2 CERVICAL PAIN: Primary | ICD-10-CM

## 2017-05-30 PROCEDURE — 97113 AQUATIC THERAPY/EXERCISES: CPT | Performed by: PHYSICAL THERAPIST

## 2017-05-31 ENCOUNTER — HOSPITAL ENCOUNTER (OUTPATIENT)
Dept: PHYSICAL THERAPY | Facility: HOSPITAL | Age: 62
Setting detail: THERAPIES SERIES
Discharge: HOME | End: 2017-05-31

## 2017-05-31 DIAGNOSIS — M25.512 ACUTE PAIN OF LEFT SHOULDER: ICD-10-CM

## 2017-05-31 DIAGNOSIS — M54.2 CERVICAL PAIN: Primary | ICD-10-CM

## 2017-05-31 DIAGNOSIS — M54.50 ACUTE BILATERAL LOW BACK PAIN WITHOUT SCIATICA: ICD-10-CM

## 2017-05-31 PROCEDURE — 97140 MANUAL THERAPY 1/> REGIONS: CPT | Performed by: PHYSICAL THERAPIST

## 2017-06-13 ENCOUNTER — HOSPITAL ENCOUNTER (OUTPATIENT)
Dept: PHYSICAL THERAPY | Facility: HOSPITAL | Age: 62
Setting detail: THERAPIES SERIES
End: 2017-06-13

## 2017-06-15 RX ORDER — ZOLPIDEM TARTRATE 10 MG/1
TABLET ORAL
Qty: 30 TABLET | Refills: 0 | OUTPATIENT
Start: 2017-06-15 | End: 2017-08-02 | Stop reason: SDUPTHER

## 2017-06-16 ENCOUNTER — HOSPITAL ENCOUNTER (OUTPATIENT)
Dept: PHYSICAL THERAPY | Facility: HOSPITAL | Age: 62
Setting detail: THERAPIES SERIES
Discharge: HOME | End: 2017-06-16

## 2017-06-16 DIAGNOSIS — M54.50 ACUTE BILATERAL LOW BACK PAIN WITHOUT SCIATICA: Primary | ICD-10-CM

## 2017-06-16 DIAGNOSIS — M25.512 ACUTE PAIN OF LEFT SHOULDER: ICD-10-CM

## 2017-06-16 DIAGNOSIS — M54.2 CERVICAL PAIN: ICD-10-CM

## 2017-06-16 PROCEDURE — 97140 MANUAL THERAPY 1/> REGIONS: CPT | Performed by: PHYSICAL THERAPIST
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
## 2017-06-20 ENCOUNTER — HOSPITAL ENCOUNTER (OUTPATIENT)
Dept: PHYSICAL THERAPY | Facility: HOSPITAL | Age: 62
Setting detail: THERAPIES SERIES
Discharge: HOME | End: 2017-06-20

## 2017-06-20 DIAGNOSIS — S83.92XD SPRAIN OF LEFT KNEE, UNSPECIFIED LIGAMENT, SUBSEQUENT ENCOUNTER: ICD-10-CM

## 2017-06-20 DIAGNOSIS — M25.512 ACUTE PAIN OF LEFT SHOULDER: ICD-10-CM

## 2017-06-20 DIAGNOSIS — M54.2 CERVICAL PAIN: ICD-10-CM

## 2017-06-20 DIAGNOSIS — M54.50 ACUTE BILATERAL LOW BACK PAIN WITHOUT SCIATICA: Primary | ICD-10-CM

## 2017-06-20 PROCEDURE — 97113 AQUATIC THERAPY/EXERCISES: CPT | Performed by: PHYSICAL THERAPIST

## 2017-06-20 NOTE — THERAPY TREATMENT NOTE
"    Outpatient Physical Therapy Ortho Treatment Note  [facility]     Patient Name: Maik Lara  : 1955  MRN: [id number]  Today's Date: 2017      Visit Date: 2017    Visit Dx:    ICD-10-CM ICD-9-CM   1. Acute bilateral low back pain without sciatica M54.5 724.2     338.19   2. Cervical pain M54.2 723.1   3. Sprain of left knee, unspecified ligament, subsequent encounter S83.92XD 844.9   4. Acute pain of left shoulder M25.512 719.41       There is no problem list on file for this patient.       Past Medical History:   Diagnosis Date   • Allergic rhinitis    • Anxiety    • Arthritis    • Cholecystitis    • Cholelithiasis    • Chronic pain    • Depression    • Hypertension    • Rhabdomyolysis    • Sleep apnea         Past Surgical History:   Procedure Laterality Date   • ANAL FISTULOTOMY     • FOOT SURGERY Right                              PT Assessment/Plan       17 1054       PT Assessment    Assessment Comments Focus today on B hip stretching to help gain more neutral position of R hip and take stress from L knee.  -PB       User Key  (r) = Recorded By, (t) = Taken By, (c) = Cosigned By    Initials Name Provider Type    PB Susan Solano, PT Physical Therapist                    Exercises       17 1000          Subjective Comments    Subjective Comments Left knee hurting from favoring R hip  -PB      Subjective Pain    Able to rate subjective pain? yes  -PB      Pre-Treatment Pain Level 6  -PB      Post-Treatment Pain Level 5  -PB      Aquatics LE    Water Walk forward;side   x 3  -PB      Stretch 2 HS LN x 60\"/1 with hip sweep  -PB      Stretch Other 1 Upper trunk rotation LN x 10 B  -PB      Stretch Other 2 Quad LN 30\"/1 and calf  x 30\"  -PB      Vertical Traction Deep water   -PB      Abdominals noodle   LN x 30  -PB      Clams 15  -PB      March in Place 10 with neutral hip position  -PB      Aquatics UE    Scap Retraction/Row BP L5 x 25  -PB      I's/T's/Y's T BP x 20  -PB      " Abdominals Trunk rot BP L5 x 10  -PB        User Key  (r) = Recorded By, (t) = Taken By, (c) = Cosigned By    Initials Name Provider Type    PB Susan Solano, PT Physical Therapist                                       Time Calculation:   Start Time: 1000  Stop Time: 1045  Time Calculation (min): 45 min    Therapy Charges for Today     Code Description Service Date Service Provider Modifiers Qty    49186498612 HC PT AQUATIC THERAPY EA 15 MIN 6/20/2017 Susan Solano, PT GP 3                    Susan Solano PT  6/20/2017

## 2017-06-23 ENCOUNTER — HOSPITAL ENCOUNTER (OUTPATIENT)
Dept: PHYSICAL THERAPY | Facility: HOSPITAL | Age: 62
Setting detail: THERAPIES SERIES
Discharge: HOME | End: 2017-06-23

## 2017-06-23 DIAGNOSIS — M54.50 ACUTE BILATERAL LOW BACK PAIN WITHOUT SCIATICA: Primary | ICD-10-CM

## 2017-06-23 DIAGNOSIS — S83.92XD SPRAIN OF LEFT KNEE, UNSPECIFIED LIGAMENT, SUBSEQUENT ENCOUNTER: ICD-10-CM

## 2017-06-23 DIAGNOSIS — M54.2 CERVICAL PAIN: ICD-10-CM

## 2017-06-23 PROCEDURE — 97110 THERAPEUTIC EXERCISES: CPT | Performed by: PHYSICAL THERAPIST

## 2017-06-23 NOTE — THERAPY TREATMENT NOTE
Outpatient Physical Therapy Ortho Treatment Note  [facility]     Patient Name: Maik Lara  : 1955  MRN: [id number]  Today's Date: 2017      Visit Date: 2017    Visit Dx:    ICD-10-CM ICD-9-CM   1. Acute bilateral low back pain without sciatica M54.5 724.2     338.19   2. Cervical pain M54.2 723.1   3. Sprain of left knee, unspecified ligament, subsequent encounter S83.92XD 844.9       There is no problem list on file for this patient.       Past Medical History:   Diagnosis Date   • Allergic rhinitis    • Anxiety    • Arthritis    • Cholecystitis    • Cholelithiasis    • Chronic pain    • Depression    • Hypertension    • Rhabdomyolysis    • Sleep apnea         Past Surgical History:   Procedure Laterality Date   • ANAL FISTULOTOMY     • FOOT SURGERY Right                              PT Assessment/Plan       17 1041       PT Assessment    Assessment Comments Left knee pain decreased with added heel lift to left shoe to help even weight distribution between R and L legs.  Patient with R hip increased hip ER still compared to L side.  He was able to walk out with less pain and reduced limp.   -PB       User Key  (r) = Recorded By, (t) = Taken By, (c) = Cosigned By    Initials Name Provider Type    PB Susan Solano, PT Physical Therapist                    Exercises       17 1000          Subjective Comments    Subjective Comments Left knee is hurting.  I can tell I am placing more weight on the left ball of the foot and the right heel so that I am not standing evenly.   -PB      Subjective Pain    Able to rate subjective pain? yes  -PB      Pre-Treatment Pain Level 10  -PB      Post-Treatment Pain Level 5  -PB      Aquatics    Aquatics performed? No  -PB      Exercise 1    Exercise Name 1 Abd brace with bridges x 10 (added ball between knees to help facilitate neutral hip position and reduce turning out of R foot) x 5.  SLR x 10 B trying to keep the hip neutral.  Passive  "stretching performed to B hamstrings and hip rotators x 30\" B.  Issued 1/4 in heel lift for L shoe that made him feel like weight was distributed more evenly in sitting as well as standing.   -PB        User Key  (r) = Recorded By, (t) = Taken By, (c) = Cosigned By    Initials Name Provider Type    PB Susan Solano, PT Physical Therapist                                       Time Calculation:   Start Time: 0951  Stop Time: 1028  Time Calculation (min): 37 min    Therapy Charges for Today     Code Description Service Date Service Provider Modifiers Qty    56280143409  PT THER PROC EA 15 MIN 6/23/2017 Susan Solano, PT GP 2                    Susan Solano, PT  6/23/2017     "

## 2017-06-27 ENCOUNTER — HOSPITAL ENCOUNTER (OUTPATIENT)
Dept: PHYSICAL THERAPY | Facility: HOSPITAL | Age: 62
Setting detail: THERAPIES SERIES
Discharge: HOME | End: 2017-06-27

## 2017-06-27 DIAGNOSIS — M54.2 CERVICAL PAIN: ICD-10-CM

## 2017-06-27 DIAGNOSIS — S83.92XD SPRAIN OF LEFT KNEE, UNSPECIFIED LIGAMENT, SUBSEQUENT ENCOUNTER: ICD-10-CM

## 2017-06-27 DIAGNOSIS — M54.50 ACUTE BILATERAL LOW BACK PAIN WITHOUT SCIATICA: Primary | ICD-10-CM

## 2017-06-27 PROCEDURE — 97113 AQUATIC THERAPY/EXERCISES: CPT | Performed by: PHYSICAL THERAPIST

## 2017-06-27 NOTE — THERAPY TREATMENT NOTE
"    Outpatient Physical Therapy Ortho Treatment Note  [facility]     Patient Name: Maik Lara  : 1955  MRN: [id number]  Today's Date: 2017      Visit Date: 2017    Visit Dx:    ICD-10-CM ICD-9-CM   1. Acute bilateral low back pain without sciatica M54.5 724.2     338.19   2. Cervical pain M54.2 723.1   3. Sprain of left knee, unspecified ligament, subsequent encounter S83.92XD 844.9       There is no problem list on file for this patient.       Past Medical History:   Diagnosis Date   • Allergic rhinitis    • Anxiety    • Arthritis    • Cholecystitis    • Cholelithiasis    • Chronic pain    • Depression    • Hypertension    • Rhabdomyolysis    • Sleep apnea         Past Surgical History:   Procedure Laterality Date   • ANAL FISTULOTOMY     • FOOT SURGERY Right                              PT Assessment/Plan       17 1208       PT Assessment    Assessment Comments Maik walking better without needing heel lift and more symmetrical hip rotation from last week.  L knee pain reduced from last week as well from 10/10 to 3/10.  -PB       User Key  (r) = Recorded By, (t) = Taken By, (c) = Cosigned By    Initials Name Provider Type    PB Susan Solano, PT Physical Therapist                    Exercises       17 1000          Subjective Comments    Subjective Comments Better after chiropractor did 3 adjustments to hips  -PB      Subjective Pain    Able to rate subjective pain? yes  -PB      Pre-Treatment Pain Level 3  -PB      Post-Treatment Pain Level 3  -PB      Aquatics    Aquatics performed? Yes  -PB      Aquatics LE    Water Walk forward;side   x 3  -PB      Stretch 2 HS LN x 60\"/1 with hip sweep  -PB      Stretch Other 1 Upper trunk rotation LN x 10 B  -PB      Stretch Other 2 Quad LN 60\"/1 and calf  x 30\"  -PB      Vertical Traction Deep water   -PB      Abdominals noodle   LN x 30  -PB      Clams 15  -PB      March in Place Leg press BR x 20  -PB      Mini Squat 20 front, 10 side   " -PB      Aquatics UE    Scap Retraction/Row BP L5 x 20  -PB      Scaption BP L5 x 20  -PB      External Rotation @ 0 BP L5 x 20  -PB        User Key  (r) = Recorded By, (t) = Taken By, (c) = Cosigned By    Initials Name Provider Type    PB Susan Solano, PT Physical Therapist                                       Time Calculation:   Start Time: 1000  Stop Time: 1030  Time Calculation (min): 30 min    Therapy Charges for Today     Code Description Service Date Service Provider Modifiers Qty    12438062014 HC PT AQUATIC THERAPY EA 15 MIN 6/27/2017 Susan Solano, PT GP 2                    Susan Solano, PT  6/27/2017

## 2017-06-30 ENCOUNTER — APPOINTMENT (OUTPATIENT)
Dept: PHYSICAL THERAPY | Facility: HOSPITAL | Age: 62
End: 2017-06-30

## 2017-07-06 ENCOUNTER — APPOINTMENT (OUTPATIENT)
Dept: PHYSICAL THERAPY | Facility: HOSPITAL | Age: 62
End: 2017-07-06

## 2017-07-11 ENCOUNTER — HOSPITAL ENCOUNTER (OUTPATIENT)
Dept: PHYSICAL THERAPY | Facility: HOSPITAL | Age: 62
Setting detail: THERAPIES SERIES
Discharge: HOME | End: 2017-07-11

## 2017-07-11 DIAGNOSIS — M54.2 CERVICAL PAIN: ICD-10-CM

## 2017-07-11 DIAGNOSIS — M54.50 ACUTE BILATERAL LOW BACK PAIN WITHOUT SCIATICA: Primary | ICD-10-CM

## 2017-07-11 DIAGNOSIS — S83.92XD SPRAIN OF LEFT KNEE, UNSPECIFIED LIGAMENT, SUBSEQUENT ENCOUNTER: ICD-10-CM

## 2017-07-11 PROCEDURE — 97113 AQUATIC THERAPY/EXERCISES: CPT | Performed by: PHYSICAL THERAPIST

## 2017-07-11 NOTE — THERAPY TREATMENT NOTE
"    Outpatient Physical Therapy Ortho Treatment Note  [facility]     Patient Name: Maik Marco  : 1955  MRN: [id number]  Today's Date: 2017      Visit Date: 2017    Visit Dx:    ICD-10-CM ICD-9-CM   1. Acute bilateral low back pain without sciatica M54.5 724.2     338.19   2. Cervical pain M54.2 723.1   3. Sprain of left knee, unspecified ligament, subsequent encounter S83.92XD 844.9       There is no problem list on file for this patient.       Past Medical History:   Diagnosis Date   • Allergic rhinitis    • Anxiety    • Arthritis    • Cholecystitis    • Cholelithiasis    • Chronic pain    • Depression    • Hypertension    • Rhabdomyolysis    • Sleep apnea         Past Surgical History:   Procedure Laterality Date   • ANAL FISTULOTOMY     • FOOT SURGERY Right                              PT Assessment/Plan       17 1212       PT Assessment    Assessment Comments Maik reports worsening pain in L hip that is not as relieved by aquatic stretching today but not worse either.  Will need to reassess next land visit  -PB       User Key  (r) = Recorded By, (t) = Taken By, (c) = Cosigned By    Initials Name Provider Type    PB Susan Solano, PT Physical Therapist                    Exercises       17 1200          Subjective Comments    Subjective Comments I have had increased pain since prior to leaving for trip feels like nerve pain in the R hip and tightness in neck and shoulders.  -PB      Subjective Pain    Able to rate subjective pain? yes  -PB      Pre-Treatment Pain Level 10  -PB      Post-Treatment Pain Level 10  -PB      Aquatics    Aquatics performed? Yes  -PB      Aquatics LE    Water Walk forward;side   x 3  -PB      Stretch 2 HS LN x 60\"/1 with hip sweep  -PB      Stretch 3 Pirifomis LN under knee x 60\"  -PB      Stretch Other 1 Upper trunk rotation LN x 10 B  -PB      Stretch Other 2 Quad LN 60\"/1 and calf  x 30\"  -PB      Vertical Traction Deep water   -PB      Abdominals " noodle   LN x 30  -PB      Clams 15  -PB      Hip Abd/Add 15  -PB      Hip Flex/Ext Hip circles 15/2  -PB      Aquatics UE    Scap Retraction/Row  BP L5 x 20 B x 20 alt  -PB      I's/T's/Y's T BP L5 x 20  -PB        User Key  (r) = Recorded By, (t) = Taken By, (c) = Cosigned By    Initials Name Provider Type    PB Susan Solano, PT Physical Therapist                                       Time Calculation:   Start Time: 0950  Stop Time: 1030  Time Calculation (min): 40 min    Therapy Charges for Today     Code Description Service Date Service Provider Modifiers Qty    49961152455 HC PT AQUATIC THERAPY EA 15 MIN 7/11/2017 Susan Solano, PT GP 3                    Susan Solano, PT  7/11/2017

## 2017-07-14 ENCOUNTER — HOSPITAL ENCOUNTER (OUTPATIENT)
Dept: PHYSICAL THERAPY | Facility: HOSPITAL | Age: 62
Setting detail: THERAPIES SERIES
Discharge: HOME | End: 2017-07-14

## 2017-07-14 DIAGNOSIS — S83.92XD SPRAIN OF LEFT KNEE, UNSPECIFIED LIGAMENT, SUBSEQUENT ENCOUNTER: ICD-10-CM

## 2017-07-14 DIAGNOSIS — M54.50 ACUTE BILATERAL LOW BACK PAIN WITHOUT SCIATICA: Primary | ICD-10-CM

## 2017-07-14 DIAGNOSIS — M54.2 CERVICAL PAIN: ICD-10-CM

## 2017-07-14 PROCEDURE — 97140 MANUAL THERAPY 1/> REGIONS: CPT | Performed by: PHYSICAL THERAPIST
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
## 2017-07-20 ENCOUNTER — HOSPITAL ENCOUNTER (OUTPATIENT)
Dept: PHYSICAL THERAPY | Facility: HOSPITAL | Age: 62
Setting detail: THERAPIES SERIES
Discharge: HOME | End: 2017-07-20

## 2017-07-20 DIAGNOSIS — M54.50 ACUTE BILATERAL LOW BACK PAIN WITHOUT SCIATICA: Primary | ICD-10-CM

## 2017-07-20 DIAGNOSIS — S83.92XD SPRAIN OF LEFT KNEE, UNSPECIFIED LIGAMENT, SUBSEQUENT ENCOUNTER: ICD-10-CM

## 2017-07-20 DIAGNOSIS — M54.2 CERVICAL PAIN: ICD-10-CM

## 2017-07-20 PROCEDURE — 97113 AQUATIC THERAPY/EXERCISES: CPT | Performed by: PHYSICAL THERAPIST

## 2017-07-20 NOTE — THERAPY TREATMENT NOTE
Outpatient Physical Therapy Ortho Treatment Note  [facility]     Patient Name: Maik Lara  : 1955  MRN: [id number]  Today's Date: 2017      Visit Date: 2017    Visit Dx:    ICD-10-CM ICD-9-CM   1. Acute bilateral low back pain without sciatica M54.5 724.2     338.19   2. Cervical pain M54.2 723.1   3. Sprain of left knee, unspecified ligament, subsequent encounter S83.92XD 844.9       There is no problem list on file for this patient.       Past Medical History:   Diagnosis Date   • Allergic rhinitis    • Anxiety    • Arthritis    • Cholecystitis    • Cholelithiasis    • Chronic pain    • Depression    • Hypertension    • Rhabdomyolysis    • Sleep apnea         Past Surgical History:   Procedure Laterality Date   • ANAL FISTULOTOMY     • FOOT SURGERY Right                              PT Assessment/Plan       17 1219       PT Assessment    Assessment Comments Mr. Lara reports some progress with L hip / back pain symptoms since last session after session with chiropractor. He still does not feel as well as he had progressed to at least 1 month ago. I issued him an aquatic HEP because I feel these exercises have been benficial for him to continue with 2-3 times a week and I am uncertain of his insurance coverage standings. He would benefit to continue physical therapy as he does still report pain level higher than it had been but I do not want him  his PT to be covered by his insurance.   -PB       User Key  (r) = Recorded By, (t) = Taken By, (c) = Cosigned By    Initials Name Provider Type    MARIA G Solano, PT Physical Therapist                    Exercises       17 0900          Subjective Comments    Subjective Comments Better after chiropractic adjustments  -PB      Subjective Pain    Able to rate subjective pain? yes  -PB      Pre-Treatment Pain Level 7  -PB      Post-Treatment Pain Level 7  -PB      Aquatics    Aquatics performed? Yes  -PB      Aquatics LE    Water Walk  "forward;side   x 3  -PB      Stretch 1 R trunk SB with ITB stretch 10\"/6  -PB      Stretch 2 HS LN x 60\"/1 with hip sweep  -PB      Stretch 3 Pirifomis LN under knee x 60\"  -PB      Stretch Other 1 Upper trunk rotation LN x 10 B  -PB      Stretch Other 2 Quad LN 60\"/1 and calf  x 30\"  -PB      Vertical Traction Deep water   -PB      Abdominals noodle   LN x 30  -PB      Clams --  -PB      Hip Abd/Add 15  -PB      Hip Flex/Ext Hip circles 15/2  -PB      Bicycle At rail x 1 min   -PB      Aquatics UE    Scap Retraction/Row Buoy x 15  -PB      Scaption Buoy x 15  -PB      External Rotation @ 0 Buoy x 15  -PB      I's/T's/Y's T Buoy x 15   -PB        User Key  (r) = Recorded By, (t) = Taken By, (c) = Cosigned By    Initials Name Provider Type    PB Susan Solano PT Physical Therapist                                   Therapy Education       07/20/17 1219          Therapy Education    Given HEP   Issued aquatic HEP  -PB      Program New  -PB      How Provided Verbal;Demonstration;Written  -PB      Provided to Patient  -PB      Level of Understanding Teach back education performed;Verbalized;Demonstrated  -PB        User Key  (r) = Recorded By, (t) = Taken By, (c) = Cosigned By    Initials Name Provider Type    PB Susan Solano PT Physical Therapist                Time Calculation:   Start Time: 0910  Stop Time: 0955  Time Calculation (min): 45 min    Therapy Charges for Today     Code Description Service Date Service Provider Modifiers Qty    73882402857  PT AQUATIC THERAPY EA 15 MIN 7/20/2017 Susan Solano PT GP 3                    Susan Solano PT  7/20/2017       "

## 2017-08-02 RX ORDER — ZOLPIDEM TARTRATE 10 MG/1
10 TABLET ORAL NIGHTLY PRN
Qty: 30 TABLET | Refills: 0 | OUTPATIENT
Start: 2017-08-02 | End: 2017-08-30 | Stop reason: SDUPTHER

## 2017-08-30 RX ORDER — ZOLPIDEM TARTRATE 10 MG/1
10 TABLET ORAL NIGHTLY PRN
Qty: 30 TABLET | Refills: 0 | OUTPATIENT
Start: 2017-08-30 | End: 2017-10-09 | Stop reason: SDUPTHER

## 2017-09-26 ENCOUNTER — TRANSCRIBE ORDERS (OUTPATIENT)
Dept: ADMINISTRATIVE | Facility: HOSPITAL | Age: 62
End: 2017-09-26

## 2017-09-26 DIAGNOSIS — M25.551 RIGHT HIP PAIN: Primary | ICD-10-CM

## 2017-09-29 RX ORDER — OMEPRAZOLE 40 MG/1
CAPSULE, DELAYED RELEASE ORAL
Qty: 90 CAPSULE | Refills: 1 | Status: SHIPPED | OUTPATIENT
Start: 2017-09-29 | End: 2018-03-26 | Stop reason: SDUPTHER

## 2017-09-29 RX ORDER — FLUTICASONE PROPIONATE 50 MCG
SPRAY, SUSPENSION (ML) NASAL
Qty: 48 G | Refills: 3 | Status: SHIPPED | OUTPATIENT
Start: 2017-09-29 | End: 2018-09-13 | Stop reason: SDUPTHER

## 2017-10-02 ENCOUNTER — HOSPITAL ENCOUNTER (OUTPATIENT)
Dept: GENERAL RADIOLOGY | Facility: HOSPITAL | Age: 62
Discharge: HOME | End: 2017-10-02
Attending: ORTHOPAEDIC SURGERY | Admitting: ORTHOPAEDIC SURGERY

## 2017-10-02 DIAGNOSIS — M25.551 RIGHT HIP PAIN: ICD-10-CM

## 2017-10-02 PROCEDURE — 25010000002 METHYLPREDNISOLONE PER 80 MG: Performed by: ORTHOPAEDIC SURGERY

## 2017-10-02 PROCEDURE — 0 IOPAMIDOL 61 % SOLUTION: Performed by: ORTHOPAEDIC SURGERY

## 2017-10-02 PROCEDURE — 77002 NEEDLE LOCALIZATION BY XRAY: CPT

## 2017-10-02 RX ORDER — LIDOCAINE HYDROCHLORIDE 10 MG/ML
10 INJECTION, SOLUTION INFILTRATION; PERINEURAL ONCE
Status: COMPLETED | OUTPATIENT
Start: 2017-10-02 | End: 2017-10-02

## 2017-10-02 RX ORDER — METHYLPREDNISOLONE ACETATE 80 MG/ML
80 INJECTION, SUSPENSION INTRA-ARTICULAR; INTRALESIONAL; INTRAMUSCULAR; SOFT TISSUE ONCE
Status: COMPLETED | OUTPATIENT
Start: 2017-10-02 | End: 2017-10-02

## 2017-10-02 RX ORDER — BUPIVACAINE HYDROCHLORIDE 2.5 MG/ML
10 INJECTION, SOLUTION EPIDURAL; INFILTRATION; INTRACAUDAL ONCE
Status: COMPLETED | OUTPATIENT
Start: 2017-10-02 | End: 2017-10-02

## 2017-10-02 RX ADMIN — LIDOCAINE HYDROCHLORIDE 3 ML: 10 INJECTION, SOLUTION INFILTRATION; PERINEURAL at 12:01

## 2017-10-02 RX ADMIN — IOPAMIDOL 2 ML: 612 INJECTION, SOLUTION INTRAVENOUS at 11:52

## 2017-10-02 RX ADMIN — METHYLPREDNISOLONE ACETATE 80 MG: 80 INJECTION, SUSPENSION INTRA-ARTICULAR; INTRALESIONAL; INTRAMUSCULAR; SOFT TISSUE at 12:01

## 2017-10-02 RX ADMIN — BUPIVACAINE HYDROCHLORIDE 5 ML: 2.5 INJECTION, SOLUTION EPIDURAL; INFILTRATION; INTRACAUDAL; PERINEURAL at 12:52

## 2017-10-09 RX ORDER — ZOLPIDEM TARTRATE 10 MG/1
10 TABLET ORAL NIGHTLY PRN
Qty: 30 TABLET | Refills: 0 | OUTPATIENT
Start: 2017-10-09 | End: 2017-11-09 | Stop reason: SDUPTHER

## 2017-11-02 ENCOUNTER — OFFICE VISIT (OUTPATIENT)
Dept: FAMILY MEDICINE CLINIC | Facility: CLINIC | Age: 62
End: 2017-11-02

## 2017-11-02 VITALS
OXYGEN SATURATION: 97 % | HEART RATE: 85 BPM | HEIGHT: 72 IN | DIASTOLIC BLOOD PRESSURE: 82 MMHG | WEIGHT: 315 LBS | SYSTOLIC BLOOD PRESSURE: 144 MMHG | BODY MASS INDEX: 42.66 KG/M2 | TEMPERATURE: 98.3 F

## 2017-11-02 DIAGNOSIS — M62.82 NON-TRAUMATIC RHABDOMYOLYSIS: ICD-10-CM

## 2017-11-02 DIAGNOSIS — I10 HYPERTENSION, ESSENTIAL: ICD-10-CM

## 2017-11-02 DIAGNOSIS — Z20.2 POSSIBLE EXPOSURE TO STD: Primary | ICD-10-CM

## 2017-11-02 DIAGNOSIS — Z23 NEED FOR IMMUNIZATION AGAINST INFLUENZA: ICD-10-CM

## 2017-11-02 DIAGNOSIS — Z23 IMMUNIZATION DUE: ICD-10-CM

## 2017-11-02 DIAGNOSIS — Z12.11 SCREEN FOR COLON CANCER: ICD-10-CM

## 2017-11-02 DIAGNOSIS — E78.49 OTHER HYPERLIPIDEMIA: ICD-10-CM

## 2017-11-02 DIAGNOSIS — R82.81 PYURIA: ICD-10-CM

## 2017-11-02 DIAGNOSIS — E78.5 ELEVATED LIPIDS: Primary | ICD-10-CM

## 2017-11-02 DIAGNOSIS — M62.81 MUSCLE WEAKNESS: ICD-10-CM

## 2017-11-02 DIAGNOSIS — Z12.5 ENCOUNTER FOR PROSTATE CANCER SCREENING: ICD-10-CM

## 2017-11-02 LAB
ALBUMIN SERPL-MCNC: 4.4 G/DL (ref 3.5–5.2)
ALBUMIN/GLOB SERPL: 1.3 G/DL
ALP SERPL-CCNC: 70 U/L (ref 39–117)
ALT SERPL W P-5'-P-CCNC: 17 U/L (ref 1–41)
ANION GAP SERPL CALCULATED.3IONS-SCNC: 11.3 MMOL/L
AST SERPL-CCNC: 18 U/L (ref 1–40)
BACTERIA UR QL AUTO: NORMAL /HPF
BILIRUB SERPL-MCNC: 0.2 MG/DL (ref 0.1–1.2)
BILIRUB UR QL STRIP: NEGATIVE
BUN BLD-MCNC: 12 MG/DL (ref 8–23)
BUN/CREAT SERPL: 12.4 (ref 7–25)
CALCIUM SPEC-SCNC: 9.7 MG/DL (ref 8.6–10.5)
CHLORIDE SERPL-SCNC: 104 MMOL/L (ref 98–107)
CHOLEST SERPL-MCNC: 224 MG/DL (ref 0–200)
CLARITY UR: CLEAR
CO2 SERPL-SCNC: 28.7 MMOL/L (ref 22–29)
COLOR UR: YELLOW
CREAT BLD-MCNC: 0.97 MG/DL (ref 0.76–1.27)
GFR SERPL CREATININE-BSD FRML MDRD: 95 ML/MIN/1.73
GLOBULIN UR ELPH-MCNC: 3.5 GM/DL
GLUCOSE BLD-MCNC: 97 MG/DL (ref 65–99)
GLUCOSE UR STRIP-MCNC: NEGATIVE MG/DL
HDLC SERPL-MCNC: 42 MG/DL (ref 40–60)
HGB UR QL STRIP.AUTO: NEGATIVE
HIV1 P24 AG SER QL: NORMAL
HIV1+2 AB SER QL: NORMAL
KETONES UR QL STRIP: NEGATIVE
LDLC SERPL CALC-MCNC: 149 MG/DL (ref 0–100)
LDLC/HDLC SERPL: 3.55 {RATIO}
LEUKOCYTE ESTERASE UR QL STRIP.AUTO: NEGATIVE
NITRITE UR QL STRIP: NEGATIVE
PH UR STRIP.AUTO: 7 [PH] (ref 4.6–8)
POTASSIUM BLD-SCNC: 3.8 MMOL/L (ref 3.5–5.2)
PROT SERPL-MCNC: 7.9 G/DL (ref 6–8.5)
PROT UR QL STRIP: NEGATIVE
PSA SERPL-MCNC: 3.13 NG/ML (ref 0–4)
RBC # UR: NORMAL /HPF
REF LAB TEST METHOD: NORMAL
SODIUM BLD-SCNC: 144 MMOL/L (ref 136–145)
SP GR UR STRIP: 1.02 (ref 1–1.03)
SQUAMOUS #/AREA URNS HPF: NORMAL /HPF
TRIGL SERPL-MCNC: 165 MG/DL (ref 0–150)
UROBILINOGEN UR QL STRIP: NORMAL
VLDLC SERPL-MCNC: 33 MG/DL (ref 5–40)
WBC UR QL AUTO: NORMAL /HPF

## 2017-11-02 PROCEDURE — 99214 OFFICE O/P EST MOD 30 MIN: CPT | Performed by: INTERNAL MEDICINE

## 2017-11-02 PROCEDURE — 81001 URINALYSIS AUTO W/SCOPE: CPT | Performed by: INTERNAL MEDICINE

## 2017-11-02 PROCEDURE — 90471 IMMUNIZATION ADMIN: CPT | Performed by: INTERNAL MEDICINE

## 2017-11-02 PROCEDURE — 87899 AGENT NOS ASSAY W/OPTIC: CPT | Performed by: INTERNAL MEDICINE

## 2017-11-02 PROCEDURE — 36415 COLL VENOUS BLD VENIPUNCTURE: CPT | Performed by: INTERNAL MEDICINE

## 2017-11-02 PROCEDURE — 80053 COMPREHEN METABOLIC PANEL: CPT | Performed by: INTERNAL MEDICINE

## 2017-11-02 PROCEDURE — G0432 EIA HIV-1/HIV-2 SCREEN: HCPCS | Performed by: INTERNAL MEDICINE

## 2017-11-02 PROCEDURE — 90686 IIV4 VACC NO PRSV 0.5 ML IM: CPT | Performed by: INTERNAL MEDICINE

## 2017-11-02 PROCEDURE — 86592 SYPHILIS TEST NON-TREP QUAL: CPT | Performed by: INTERNAL MEDICINE

## 2017-11-02 PROCEDURE — 84153 ASSAY OF PSA TOTAL: CPT | Performed by: INTERNAL MEDICINE

## 2017-11-02 PROCEDURE — 80061 LIPID PANEL: CPT | Performed by: INTERNAL MEDICINE

## 2017-11-02 RX ORDER — DOXYCYCLINE HYCLATE 100 MG/1
100 CAPSULE ORAL 2 TIMES DAILY
Qty: 20 CAPSULE | Refills: 0 | Status: SHIPPED | OUTPATIENT
Start: 2017-11-02 | End: 2018-02-01

## 2017-11-02 RX ORDER — MODAFINIL 200 MG/1
200 TABLET ORAL 2 TIMES DAILY
Qty: 180 TABLET | Refills: 0 | Status: SHIPPED | OUTPATIENT
Start: 2017-11-02 | End: 2019-08-01

## 2017-11-02 NOTE — PROGRESS NOTES
Subjective   Maik Lara is a 62 y.o. male.   Patient having some progression finish course water therapy for his rhabdomyolysis and weakness had been doing well that somewhat weaker now.  Would like this reinstituted  History of Present Illness   In addition to the rhabdomyolysis and need for physical therapy for that to be determined patient also needs follow-up on lipids blood pressure 80s.  Encounter for screening colonoscopy encounter for prostate cancer screening is not had for a while.  Does have minimal flow problems.  Also which be checked for STDs no penile discharge or symptoms but wishes recheck.    Review of Systems   All other systems reviewed and are negative.      Objective   Vitals:    11/02/17 1443   BP: 144/82   Pulse: 85   Temp: 98.3 °F (36.8 °C)   SpO2: 97%   Weight: (!) 343 lb (156 kg)     Physical Exam   Constitutional: He appears well-developed and well-nourished.   HENT:   Head: Normocephalic and atraumatic.   Eyes: Conjunctivae are normal. Pupils are equal, round, and reactive to light.   Cardiovascular: Normal rate, regular rhythm and normal heart sounds.    Pulmonary/Chest: Effort normal and breath sounds normal.   Abdominal: Soft. Bowel sounds are normal.   Genitourinary:   Genitourinary Comments: Prostate is 2+ left lobe slightly more prominent a small nodule exam in rectal vault without any masses lesions no gross blood on glove no.  No lesions noted.  Scrotum is unremarkable without palpable hernias penis is also unremarkable   Neurological: He is alert.   Gait is slightly slow but stable   Skin: Skin is warm and dry.   Nursing note and vitals reviewed.      No results found for: INR    Procedures    Assessment/Plan 1.  Hypertension plan continue monitor continue present medications call readings if still elevated approximately 1 week's time.  Otherwise recheck 6 months    2.  Hyperlipidemia plan await lab we has a statin given his prior history rhabdomyolysis we'll consider  other.    3.  Possible STD exposure plan await lab    4.  Muscle weakness/history of nontraumatic rhabdomyolysis refer for physical therapy with water therapy for about patient for enhanced improvement    5.  Encounter for colorectal screening we'll obtain referral for colonoscopy we patient prefers Dr. Rizvi    6.  Encounter for prostate cancer screening we'll get a PSA as well as a SIMA      7.  Pyuria plan doxycycline milligrams twice a day for next 10 days while we await lab specimens    8.  Immunization update for flu    Much of this encounter note is an electronic transcription/translation of spoken language to printed text.  The electronic translation of spoken language may permit erroneous, or at times, nonsensical words or phrases to be inadvertently transcribed.  Although I have reviewed the note for such errors, some may still exist.  Ref to water pt    Much of this encounter note is an electronic transcription/translation of spoken language to printed text.  The electronic translation of spoken language may permit erroneous, or at times, nonsensical words or phrases to be inadvertently transcribed.  Although I have reviewed the note for such errors, some may still exist.

## 2017-11-03 LAB
C TRACH RRNA SPEC DONR QL NAA+PROBE: NEGATIVE
HSV2 IGG SER IA-ACNC: <0.91 INDEX (ref 0–0.9)
N GONORRHOEA DNA SPEC QL NAA+PROBE: NEGATIVE
RPR SER QL: NORMAL

## 2017-11-09 ENCOUNTER — DOCUMENTATION (OUTPATIENT)
Dept: PHYSICAL THERAPY | Facility: HOSPITAL | Age: 62
End: 2017-11-09

## 2017-11-09 RX ORDER — ZOLPIDEM TARTRATE 10 MG/1
10 TABLET ORAL NIGHTLY PRN
Qty: 30 TABLET | Refills: 0 | OUTPATIENT
Start: 2017-11-09 | End: 2017-12-07 | Stop reason: SDUPTHER
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
## 2017-12-07 RX ORDER — ZOLPIDEM TARTRATE 10 MG/1
10 TABLET ORAL NIGHTLY PRN
Qty: 30 TABLET | Refills: 0 | OUTPATIENT
Start: 2017-12-07 | End: 2018-01-09 | Stop reason: SDUPTHER

## 2018-01-09 RX ORDER — ZOLPIDEM TARTRATE 10 MG/1
TABLET ORAL
Qty: 30 TABLET | Refills: 0 | OUTPATIENT
Start: 2018-01-09 | End: 2018-02-12 | Stop reason: SDUPTHER

## 2018-02-01 ENCOUNTER — OFFICE VISIT (OUTPATIENT)
Dept: FAMILY MEDICINE CLINIC | Facility: CLINIC | Age: 63
End: 2018-02-01

## 2018-02-01 VITALS
WEIGHT: 315 LBS | BODY MASS INDEX: 42.66 KG/M2 | HEART RATE: 70 BPM | DIASTOLIC BLOOD PRESSURE: 84 MMHG | TEMPERATURE: 97.9 F | SYSTOLIC BLOOD PRESSURE: 130 MMHG | HEIGHT: 72 IN | RESPIRATION RATE: 16 BRPM | OXYGEN SATURATION: 99 %

## 2018-02-01 DIAGNOSIS — I10 ESSENTIAL HYPERTENSION: ICD-10-CM

## 2018-02-01 DIAGNOSIS — T78.3XXA ANGIOEDEMA, INITIAL ENCOUNTER: Primary | ICD-10-CM

## 2018-02-01 PROCEDURE — 99213 OFFICE O/P EST LOW 20 MIN: CPT | Performed by: NURSE PRACTITIONER

## 2018-02-01 RX ORDER — NIFEDIPINE 30 MG/1
30 TABLET, FILM COATED, EXTENDED RELEASE ORAL DAILY
Qty: 90 TABLET | Refills: 0 | Status: SHIPPED | OUTPATIENT
Start: 2018-02-01 | End: 2018-03-02 | Stop reason: SDUPTHER

## 2018-02-01 RX ORDER — NIFEDIPINE 30 MG/1
30 TABLET, FILM COATED, EXTENDED RELEASE ORAL DAILY
Qty: 30 TABLET | Refills: 0 | Status: SHIPPED | OUTPATIENT
Start: 2018-02-01 | End: 2018-02-01 | Stop reason: SDUPTHER

## 2018-02-01 RX ORDER — SENNOSIDES 8.6 MG
1000 CAPSULE ORAL DAILY
COMMUNITY

## 2018-02-01 NOTE — PATIENT INSTRUCTIONS
Stop Lotrel.   Start nifedipine 30mg daily, monitor BP at home, call with elevated readings.   DME for BP machine,   May try benadryl OTC as needed for lip swelling. If persists will consider allergy referral.  Increase fluid intake, get plenty of rest.   Patient agrees with plan of care and understands instructions. Call if worsening symptoms or any problems or concerns.

## 2018-02-01 NOTE — PROGRESS NOTES
Subjective   Maik Lara is a 62 y.o. male.     History of Present Illness   C/o lip swelling. He states he noticed lower lip swelling, started 2 days ago, he states he was uncomfortably large yesterday, he states swelling has gone down today, he thinks reaction to medication, he states he did not try benadryl. He denies trouble breathing, he states he normally takes 20mg celexa daily. He states he noticed he has been taking 40mg daily instead 20mg, about 1 year ago. he states this is not a new medication. He denies any other new medications. He states he had abx 2 months ago. He denies throat swelling, he states he has had hx of dry mouth. He does get seasonol allergies. He states he has been taking lotrel for BP for years. He denies any changes in diet or OTC medications 2 days ago. He denies any numbness or tingling, denies speech problems. Denies fever, sore throat. He states Saturday he thinks he took 3 tablets for lotrel instead of his 3 tablets for Effexor.     The following portions of the patient's history were reviewed and updated as appropriate: allergies, current medications, past family history, past medical history, past social history, past surgical history and problem list.    Review of Systems   Constitutional: Negative for chills, diaphoresis and fever.   HENT: Positive for facial swelling. Negative for congestion, ear discharge, mouth sores, sinus pressure and sore throat.    Respiratory: Negative for cough and shortness of breath.    Cardiovascular: Negative for chest pain, palpitations and leg swelling.   Skin: Negative for pallor.   Neurological: Negative for dizziness, light-headedness and headaches.   All other systems reviewed and are negative.      Objective   Physical Exam   Constitutional: He is oriented to person, place, and time. He appears well-developed and well-nourished.   HENT:   Head: Normocephalic.   Mouth/Throat: Uvula is midline, oropharynx is clear and moist and mucous  membranes are normal. No oral lesions. No dental abscesses.       Eyes: Pupils are equal, round, and reactive to light.   Neck: Neck supple.   Cardiovascular: Normal rate, regular rhythm and normal heart sounds.    Pulmonary/Chest: Effort normal and breath sounds normal.   Musculoskeletal: Normal range of motion.   Neurological: He is alert and oriented to person, place, and time.   Skin: Skin is warm and dry. No rash noted.   Psychiatric: He has a normal mood and affect. His behavior is normal. Judgment and thought content normal.   Nursing note and vitals reviewed.      Assessment/Plan   Maik was seen today for allergic reaction.    Diagnoses and all orders for this visit:    Angioedema, initial encounter    Essential hypertension  -     Miscellaneous DME    Other orders  -     Discontinue: NIFEdipine CC (NIFEDIAC CC) 30 MG 24 hr tablet; Take 1 tablet by mouth Daily.  -     NIFEdipine CC (NIFEDIAC CC) 30 MG 24 hr tablet; Take 1 tablet by mouth Daily.          Stop Lotrel.   Start nifedipine 30mg daily, monitor BP at home, call with elevated readings >140/90.   DME for BP machine given.   May try benadryl OTC as needed for lip swelling. If persists will consider allergy referral.  Increase fluid intake, get plenty of rest.   F/u in about 4 weeks to recheck BP.   Patient agrees with plan of care and understands instructions. Call if worsening symptoms or any problems or concerns.

## 2018-02-05 RX ORDER — SPIRONOLACTONE 25 MG/1
TABLET ORAL
Qty: 90 TABLET | Refills: 3 | Status: SHIPPED | OUTPATIENT
Start: 2018-02-05 | End: 2019-01-20 | Stop reason: SDUPTHER

## 2018-02-05 RX ORDER — ALLOPURINOL 300 MG/1
TABLET ORAL
Qty: 90 TABLET | Refills: 3 | Status: SHIPPED | OUTPATIENT
Start: 2018-02-05 | End: 2019-01-20 | Stop reason: SDUPTHER

## 2018-02-09 RX ORDER — VENLAFAXINE HYDROCHLORIDE 75 MG/1
CAPSULE, EXTENDED RELEASE ORAL
Qty: 360 CAPSULE | Refills: 3 | Status: SHIPPED | OUTPATIENT
Start: 2018-02-09 | End: 2019-01-20 | Stop reason: SDUPTHER

## 2018-02-12 RX ORDER — ZOLPIDEM TARTRATE 10 MG/1
TABLET ORAL
Qty: 30 TABLET | Refills: 0 | OUTPATIENT
Start: 2018-02-12 | End: 2018-03-13 | Stop reason: SDUPTHER

## 2018-03-01 ENCOUNTER — TELEPHONE (OUTPATIENT)
Dept: FAMILY MEDICINE CLINIC | Facility: CLINIC | Age: 63
End: 2018-03-01

## 2018-03-02 ENCOUNTER — OFFICE VISIT (OUTPATIENT)
Dept: FAMILY MEDICINE CLINIC | Facility: CLINIC | Age: 63
End: 2018-03-02

## 2018-03-02 VITALS
SYSTOLIC BLOOD PRESSURE: 164 MMHG | BODY MASS INDEX: 42.66 KG/M2 | OXYGEN SATURATION: 96 % | HEIGHT: 72 IN | DIASTOLIC BLOOD PRESSURE: 80 MMHG | HEART RATE: 83 BPM | WEIGHT: 315 LBS | TEMPERATURE: 98.4 F

## 2018-03-02 DIAGNOSIS — Z12.5 ENCOUNTER FOR PROSTATE CANCER SCREENING: ICD-10-CM

## 2018-03-02 DIAGNOSIS — I10 HYPERTENSION, ESSENTIAL: Primary | ICD-10-CM

## 2018-03-02 DIAGNOSIS — H57.13 PAIN OF BOTH EYES: ICD-10-CM

## 2018-03-02 DIAGNOSIS — E78.49 OTHER HYPERLIPIDEMIA: ICD-10-CM

## 2018-03-02 DIAGNOSIS — Z12.5 SCREENING FOR PROSTATE CANCER: ICD-10-CM

## 2018-03-02 DIAGNOSIS — R35.1 NOCTURIA: ICD-10-CM

## 2018-03-02 DIAGNOSIS — G47.39 OTHER SLEEP APNEA: ICD-10-CM

## 2018-03-02 DIAGNOSIS — Z51.81 MEDICATION MONITORING ENCOUNTER: ICD-10-CM

## 2018-03-02 DIAGNOSIS — Z20.2 POSSIBLE EXPOSURE TO STD: ICD-10-CM

## 2018-03-02 DIAGNOSIS — G47.09 OTHER INSOMNIA: ICD-10-CM

## 2018-03-02 DIAGNOSIS — M10.00 IDIOPATHIC GOUT, UNSPECIFIED CHRONICITY, UNSPECIFIED SITE: ICD-10-CM

## 2018-03-02 LAB
ALBUMIN SERPL-MCNC: 4 G/DL (ref 3.5–5.2)
ALBUMIN/GLOB SERPL: 1.1 G/DL
ALP SERPL-CCNC: 70 U/L (ref 39–117)
ALT SERPL W P-5'-P-CCNC: 16 U/L (ref 1–41)
ANION GAP SERPL CALCULATED.3IONS-SCNC: 13.9 MMOL/L
AST SERPL-CCNC: 21 U/L (ref 1–40)
BASOPHILS # BLD AUTO: 0.03 10*3/MM3 (ref 0–0.2)
BASOPHILS NFR BLD AUTO: 0.5 % (ref 0–1.5)
BILIRUB SERPL-MCNC: 0.3 MG/DL (ref 0.1–1.2)
BUN BLD-MCNC: 10 MG/DL (ref 8–23)
BUN/CREAT SERPL: 10.1 (ref 7–25)
CALCIUM SPEC-SCNC: 9.2 MG/DL (ref 8.6–10.5)
CHLORIDE SERPL-SCNC: 105 MMOL/L (ref 98–107)
CHOLEST SERPL-MCNC: 234 MG/DL (ref 0–200)
CO2 SERPL-SCNC: 29.1 MMOL/L (ref 22–29)
CREAT BLD-MCNC: 0.99 MG/DL (ref 0.76–1.27)
DEPRECATED RDW RBC AUTO: 50.4 FL (ref 37–54)
EOSINOPHIL # BLD AUTO: 0.35 10*3/MM3 (ref 0–0.7)
EOSINOPHIL NFR BLD AUTO: 5.5 % (ref 0.3–6.2)
ERYTHROCYTE [DISTWIDTH] IN BLOOD BY AUTOMATED COUNT: 14.8 % (ref 11.5–14.5)
GFR SERPL CREATININE-BSD FRML MDRD: 93 ML/MIN/1.73
GLOBULIN UR ELPH-MCNC: 3.8 GM/DL
GLUCOSE BLD-MCNC: 78 MG/DL (ref 65–99)
HCT VFR BLD AUTO: 44.1 % (ref 40.4–52.2)
HDLC SERPL-MCNC: 41 MG/DL (ref 40–60)
HGB BLD-MCNC: 13.7 G/DL (ref 13.7–17.6)
HIV1 P24 AG SER QL: NORMAL
HIV1+2 AB SER QL: NORMAL
IMM GRANULOCYTES # BLD: 0.02 10*3/MM3 (ref 0–0.03)
IMM GRANULOCYTES NFR BLD: 0.3 % (ref 0–0.5)
LDLC SERPL CALC-MCNC: 156 MG/DL (ref 0–100)
LDLC/HDLC SERPL: 3.81 {RATIO}
LYMPHOCYTES # BLD AUTO: 2.08 10*3/MM3 (ref 0.9–4.8)
LYMPHOCYTES NFR BLD AUTO: 32.9 % (ref 19.6–45.3)
MCH RBC QN AUTO: 29.1 PG (ref 27–32.7)
MCHC RBC AUTO-ENTMCNC: 31.1 G/DL (ref 32.6–36.4)
MCV RBC AUTO: 93.8 FL (ref 79.8–96.2)
MONOCYTES # BLD AUTO: 0.72 10*3/MM3 (ref 0.2–1.2)
MONOCYTES NFR BLD AUTO: 11.4 % (ref 5–12)
NEUTROPHILS # BLD AUTO: 3.12 10*3/MM3 (ref 1.9–8.1)
NEUTROPHILS NFR BLD AUTO: 49.4 % (ref 42.7–76)
PLATELET # BLD AUTO: 242 10*3/MM3 (ref 140–500)
PMV BLD AUTO: 11.4 FL (ref 6–12)
POTASSIUM BLD-SCNC: 4.1 MMOL/L (ref 3.5–5.2)
PROT SERPL-MCNC: 7.8 G/DL (ref 6–8.5)
PSA SERPL-MCNC: 4.23 NG/ML (ref 0–4)
RBC # BLD AUTO: 4.7 10*6/MM3 (ref 4.6–6)
SODIUM BLD-SCNC: 148 MMOL/L (ref 136–145)
TRIGL SERPL-MCNC: 184 MG/DL (ref 0–150)
URATE SERPL-MCNC: 4.7 MG/DL (ref 3.4–7)
VLDLC SERPL-MCNC: 36.8 MG/DL (ref 5–40)
WBC NRBC COR # BLD: 6.32 10*3/MM3 (ref 4.5–10.7)

## 2018-03-02 PROCEDURE — 85025 COMPLETE CBC W/AUTO DIFF WBC: CPT | Performed by: INTERNAL MEDICINE

## 2018-03-02 PROCEDURE — 86592 SYPHILIS TEST NON-TREP QUAL: CPT | Performed by: INTERNAL MEDICINE

## 2018-03-02 PROCEDURE — 36415 COLL VENOUS BLD VENIPUNCTURE: CPT | Performed by: INTERNAL MEDICINE

## 2018-03-02 PROCEDURE — 81001 URINALYSIS AUTO W/SCOPE: CPT | Performed by: INTERNAL MEDICINE

## 2018-03-02 PROCEDURE — G0432 EIA HIV-1/HIV-2 SCREEN: HCPCS | Performed by: INTERNAL MEDICINE

## 2018-03-02 PROCEDURE — 99214 OFFICE O/P EST MOD 30 MIN: CPT | Performed by: INTERNAL MEDICINE

## 2018-03-02 PROCEDURE — 80061 LIPID PANEL: CPT | Performed by: INTERNAL MEDICINE

## 2018-03-02 PROCEDURE — 80053 COMPREHEN METABOLIC PANEL: CPT | Performed by: INTERNAL MEDICINE

## 2018-03-02 PROCEDURE — 84153 ASSAY OF PSA TOTAL: CPT | Performed by: INTERNAL MEDICINE

## 2018-03-02 PROCEDURE — 84550 ASSAY OF BLOOD/URIC ACID: CPT | Performed by: INTERNAL MEDICINE

## 2018-03-02 PROCEDURE — 87899 AGENT NOS ASSAY W/OPTIC: CPT | Performed by: INTERNAL MEDICINE

## 2018-03-02 RX ORDER — NIFEDIPINE 30 MG/1
TABLET, FILM COATED, EXTENDED RELEASE ORAL
Qty: 30 TABLET | Refills: 0 | Status: SHIPPED | OUTPATIENT
Start: 2018-03-02 | End: 2018-03-05 | Stop reason: SDUPTHER

## 2018-03-02 NOTE — TELEPHONE ENCOUNTER
Spoke with Tanisha and she stated they received the Amlodipine/ Benazipril and Nifidipine was sent in

## 2018-03-02 NOTE — PROGRESS NOTES
Subjective   Maik Lara is a 62 y.o. male.   Fu on bp feels lightheaded as has in past w bp elev    History of Present Illness   Follow-up on blood pressure does not check this at home does a lipids checked also and having some nocturia several times at night.  Bladder just does feel like it empties his from as it has in the past.  No cloudiness or increased temperature noted.  Patient is also having eye pain unexplained short lived both eyes does not have known glaucoma and will have patient see his ophthalmologist Dr. becker like for this.  Eyes are also slightly redder to his observation.  Although no crusting needs prostate checked also needs medication monitoring for his Ambien and his Provigil.  for insomnia and sleeapnoea respectively patient also requests STD screening for possible exposure     Review of Systems   All other systems reviewed and are negative.      Objective   Vitals:    03/02/18 1605   BP: 164/80   Pulse: 83   Temp: 98.4 °F (36.9 °C)   SpO2: 96%   Weight: (!) 154 kg (339 lb 9.6 oz)     Physical Exam   Constitutional: He appears well-developed and well-nourished.   HENT:   Head: Normocephalic and atraumatic.   Eyes: Pupils are equal, round, and reactive to light.   Conjunctiva slightly reddish at the periphery bilaterally no crusting is noted.   Cardiovascular: Normal rate, regular rhythm and normal heart sounds.    Pulmonary/Chest: Effort normal and breath sounds normal.   Abdominal: Soft. Bowel sounds are normal.   Genitourinary:   Genitourinary Comments: Prostate checked left lobe 2+ right lobe is 3+ extends wide laterally no irregularities are noted relatively smooth    Penis is within normal limits scrotum without masses or palpated hernia   Neurological: He is alert.   Slightly wide-based gait but stable   Skin: Skin is warm and dry.   Nursing note and vitals reviewed.      No results found for: INR    Procedures    Assessment/Plan   1.  Hypertension elevated plan take 2 of the nifedipine  ER 30 daily, blood pressure readings in 4 days' time.  Blood pressure suboptimal this point    2.  Gout controlled by history compliant with allopurinol    3.  Nocturia plan await urine    4.  Encounter for prostate screening plan await lab with PSA    5.  Enlarged prostate weight lab a consideration for urology consult    6.  Recurrent eye pain bilaterally plan patient sees ophthalmologist Dr. Holguin    7.  Possible STD exposure plan await pending lab  8.  Hyperlipidemia await lab if still elevated consideration for treatment patient's stroke is been hesitant do this with history of rhabdomyolysis    9.  Insomnia plan is on Ambien we'll get updated drug contract white count refill doing okay on that.    10.  Sleep apnea with treatment with Provigil get updated drug, tract not time I scheduled refills for this to be done.    11.  Medication monitoring with urine drug screen for both 10 and 9.    Much of this encounter note is an electronic transcription/translation of spoken language to printed text.  The electronic translation of spoken language may permit erroneous, or at times, nonsensical words or phrases to be inadvertently transcribed.  Although I have reviewed the note for such errors, some may still exist.

## 2018-03-03 LAB — HSV2 IGG SER IA-ACNC: <0.91 INDEX (ref 0–0.9)

## 2018-03-05 DIAGNOSIS — R97.20 ELEVATED PSA: Primary | ICD-10-CM

## 2018-03-05 LAB
BACTERIA UR QL AUTO: ABNORMAL /HPF
BILIRUB UR QL STRIP: NEGATIVE
C TRACH RRNA SPEC DONR QL NAA+PROBE: NEGATIVE
CLARITY UR: CLEAR
COLOR UR: YELLOW
GLUCOSE UR STRIP-MCNC: NEGATIVE MG/DL
HGB UR QL STRIP.AUTO: NEGATIVE
KETONES UR QL STRIP: NEGATIVE
LEUKOCYTE ESTERASE UR QL STRIP.AUTO: NEGATIVE
N GONORRHOEA DNA SPEC QL NAA+PROBE: NEGATIVE
NITRITE UR QL STRIP: NEGATIVE
PH UR STRIP.AUTO: 5.5 [PH] (ref 4.6–8)
PROT UR QL STRIP: ABNORMAL
RBC # UR: ABNORMAL /HPF
REF LAB TEST METHOD: ABNORMAL
RPR SER QL: NORMAL
SP GR UR STRIP: 1.01 (ref 1–1.03)
SQUAMOUS #/AREA URNS HPF: ABNORMAL /HPF
UROBILINOGEN UR QL STRIP: ABNORMAL
WBC UR QL AUTO: ABNORMAL /HPF

## 2018-03-05 RX ORDER — EZETIMIBE 10 MG/1
10 TABLET ORAL DAILY
Qty: 30 TABLET | Refills: 1 | Status: SHIPPED | OUTPATIENT
Start: 2018-03-05 | End: 2018-05-09 | Stop reason: SDUPTHER

## 2018-03-05 RX ORDER — NIFEDIPINE 60 MG/1
60 TABLET, FILM COATED, EXTENDED RELEASE ORAL DAILY
Qty: 30 TABLET | Refills: 1 | Status: SHIPPED | OUTPATIENT
Start: 2018-03-05 | End: 2018-05-09 | Stop reason: SDUPTHER

## 2018-03-07 LAB — CONV REPORT SUMMARY: NORMAL

## 2018-03-09 ENCOUNTER — TELEPHONE (OUTPATIENT)
Dept: FAMILY MEDICINE CLINIC | Facility: CLINIC | Age: 63
End: 2018-03-09

## 2018-03-09 NOTE — TELEPHONE ENCOUNTER
----- Message from Avel Smith Jr., MD sent at 3/8/2018  9:20 PM EST -----  If pt on effexor  Shouldn't be on lexapro or celexa  Check his actual meds    Pt states he is taking generic for effexor is not taking lexapro or celexa

## 2018-03-13 RX ORDER — ZOLPIDEM TARTRATE 10 MG/1
TABLET ORAL
Qty: 30 TABLET | Refills: 0 | Status: SHIPPED | OUTPATIENT
Start: 2018-03-13 | End: 2018-04-10 | Stop reason: SDUPTHER

## 2018-03-26 RX ORDER — CITALOPRAM 40 MG/1
TABLET ORAL
Qty: 90 TABLET | Refills: 3 | Status: SHIPPED | OUTPATIENT
Start: 2018-03-26 | End: 2019-03-10 | Stop reason: SDUPTHER

## 2018-03-26 RX ORDER — OMEPRAZOLE 40 MG/1
CAPSULE, DELAYED RELEASE ORAL
Qty: 90 CAPSULE | Refills: 1 | Status: SHIPPED | OUTPATIENT
Start: 2018-03-26 | End: 2018-09-13 | Stop reason: SDUPTHER

## 2018-04-11 RX ORDER — ZOLPIDEM TARTRATE 10 MG/1
TABLET ORAL
Qty: 30 TABLET | Refills: 0 | Status: SHIPPED | OUTPATIENT
Start: 2018-04-11 | End: 2018-05-11 | Stop reason: SDUPTHER

## 2018-05-08 DIAGNOSIS — I34.0 MITRAL VALVE INSUFFICIENCY, UNSPECIFIED ETIOLOGY: Primary | ICD-10-CM

## 2018-05-09 RX ORDER — NIFEDIPINE 60 MG/1
TABLET, FILM COATED, EXTENDED RELEASE ORAL
Qty: 30 TABLET | Refills: 0 | Status: SHIPPED | OUTPATIENT
Start: 2018-05-09 | End: 2018-06-06 | Stop reason: SDUPTHER

## 2018-05-09 RX ORDER — EZETIMIBE 10 MG/1
TABLET ORAL
Qty: 30 TABLET | Refills: 0 | Status: SHIPPED | OUTPATIENT
Start: 2018-05-09 | End: 2018-06-06 | Stop reason: SDUPTHER

## 2018-05-11 RX ORDER — EZETIMIBE 10 MG/1
TABLET ORAL
Qty: 30 TABLET | Refills: 2 | Status: SHIPPED | OUTPATIENT
Start: 2018-05-11 | End: 2018-06-01 | Stop reason: SDUPTHER

## 2018-05-11 RX ORDER — ZOLPIDEM TARTRATE 10 MG/1
TABLET ORAL
Qty: 30 TABLET | Refills: 0 | Status: SHIPPED | OUTPATIENT
Start: 2018-05-11 | End: 2018-06-06 | Stop reason: SDUPTHER

## 2018-06-01 ENCOUNTER — OFFICE VISIT (OUTPATIENT)
Dept: FAMILY MEDICINE CLINIC | Facility: CLINIC | Age: 63
End: 2018-06-01

## 2018-06-01 VITALS
WEIGHT: 315 LBS | HEART RATE: 74 BPM | BODY MASS INDEX: 42.66 KG/M2 | OXYGEN SATURATION: 98 % | SYSTOLIC BLOOD PRESSURE: 134 MMHG | DIASTOLIC BLOOD PRESSURE: 76 MMHG | HEIGHT: 72 IN | TEMPERATURE: 98.3 F

## 2018-06-01 DIAGNOSIS — I10 HYPERTENSION, ESSENTIAL: Primary | ICD-10-CM

## 2018-06-01 DIAGNOSIS — Z20.2 EXPOSURE TO SEXUALLY TRANSMITTED DISEASE (STD): ICD-10-CM

## 2018-06-01 DIAGNOSIS — E78.49 OTHER HYPERLIPIDEMIA: ICD-10-CM

## 2018-06-01 DIAGNOSIS — R60.0 LEG EDEMA: ICD-10-CM

## 2018-06-01 LAB
ALBUMIN SERPL-MCNC: 4.1 G/DL (ref 3.5–5.2)
ALBUMIN/GLOB SERPL: 1.2 G/DL
ALP SERPL-CCNC: 70 U/L (ref 39–117)
ALT SERPL W P-5'-P-CCNC: 14 U/L (ref 1–41)
ANION GAP SERPL CALCULATED.3IONS-SCNC: 11.9 MMOL/L
AST SERPL-CCNC: 15 U/L (ref 1–40)
BILIRUB SERPL-MCNC: 0.4 MG/DL (ref 0.1–1.2)
BUN BLD-MCNC: 12 MG/DL (ref 8–23)
BUN/CREAT SERPL: 10.3 (ref 7–25)
CALCIUM SPEC-SCNC: 9.5 MG/DL (ref 8.6–10.5)
CHLORIDE SERPL-SCNC: 101 MMOL/L (ref 98–107)
CHOLEST SERPL-MCNC: 184 MG/DL (ref 0–200)
CO2 SERPL-SCNC: 30.1 MMOL/L (ref 22–29)
CREAT BLD-MCNC: 1.16 MG/DL (ref 0.76–1.27)
ERYTHROCYTE [DISTWIDTH] IN BLOOD BY AUTOMATED COUNT: 14.5 % (ref 4.5–15)
GFR SERPL CREATININE-BSD FRML MDRD: 77 ML/MIN/1.73
GLOBULIN UR ELPH-MCNC: 3.5 GM/DL
GLUCOSE BLD-MCNC: 100 MG/DL (ref 65–99)
HCT VFR BLD AUTO: 41.9 % (ref 35–60)
HDLC SERPL-MCNC: 41 MG/DL (ref 40–60)
HGB BLD-MCNC: 13.8 G/DL (ref 13.5–18)
HIV1 P24 AG SER QL: NORMAL
HIV1+2 AB SER QL: NORMAL
LDLC SERPL CALC-MCNC: 121 MG/DL (ref 0–100)
LDLC/HDLC SERPL: 2.95 {RATIO}
LYMPHOCYTES # BLD AUTO: 1.8 10*3/MM3 (ref 1.2–3.4)
LYMPHOCYTES NFR BLD AUTO: 29.6 % (ref 21–51)
MCH RBC QN AUTO: 28.6 PG (ref 26.1–33.1)
MCHC RBC AUTO-ENTMCNC: 33 G/DL (ref 33–37)
MCV RBC AUTO: 86.6 FL (ref 80–99)
MONOCYTES # BLD AUTO: 0.4 10*3/MM3 (ref 0.1–0.6)
MONOCYTES NFR BLD AUTO: 6 % (ref 2–9)
NEUTROPHILS # BLD AUTO: 3.9 10*3/MM3 (ref 1.4–6.5)
NEUTROPHILS NFR BLD AUTO: 64.4 % (ref 42–75)
PLATELET # BLD AUTO: 233 10*3/MM3 (ref 150–450)
PMV BLD AUTO: 8.1 FL (ref 7.1–10.5)
POTASSIUM BLD-SCNC: 4 MMOL/L (ref 3.5–5.2)
PROT SERPL-MCNC: 7.6 G/DL (ref 6–8.5)
RBC # BLD AUTO: 4.84 10*6/MM3 (ref 4–6)
SODIUM BLD-SCNC: 143 MMOL/L (ref 136–145)
TRIGL SERPL-MCNC: 111 MG/DL (ref 0–150)
TSH SERPL DL<=0.05 MIU/L-ACNC: 3.04 MIU/ML (ref 0.27–4.2)
VLDLC SERPL-MCNC: 22.2 MG/DL (ref 5–40)
WBC NRBC COR # BLD: 6 10*3/MM3 (ref 4.5–10)

## 2018-06-01 PROCEDURE — 80061 LIPID PANEL: CPT | Performed by: INTERNAL MEDICINE

## 2018-06-01 PROCEDURE — 84443 ASSAY THYROID STIM HORMONE: CPT | Performed by: INTERNAL MEDICINE

## 2018-06-01 PROCEDURE — 80053 COMPREHEN METABOLIC PANEL: CPT | Performed by: INTERNAL MEDICINE

## 2018-06-01 PROCEDURE — G0432 EIA HIV-1/HIV-2 SCREEN: HCPCS | Performed by: INTERNAL MEDICINE

## 2018-06-01 PROCEDURE — 36415 COLL VENOUS BLD VENIPUNCTURE: CPT | Performed by: INTERNAL MEDICINE

## 2018-06-01 PROCEDURE — 87899 AGENT NOS ASSAY W/OPTIC: CPT | Performed by: INTERNAL MEDICINE

## 2018-06-01 PROCEDURE — 86592 SYPHILIS TEST NON-TREP QUAL: CPT | Performed by: INTERNAL MEDICINE

## 2018-06-01 PROCEDURE — 99214 OFFICE O/P EST MOD 30 MIN: CPT | Performed by: INTERNAL MEDICINE

## 2018-06-01 PROCEDURE — 85025 COMPLETE CBC W/AUTO DIFF WBC: CPT | Performed by: INTERNAL MEDICINE

## 2018-06-01 NOTE — PROGRESS NOTES
Subjective   Maik Lara is a 62 y.o. male.   Recent leg edema for 3 weeks time without explanation also follow-up on her blood pressure lipids  History of Present Illness   Leg edema  For 3wks nifedipine is a potential medication cause but is been on this for a while.  We will get screening lab and get venous Doppler also's request STD screening for potential exposure we'll continue this.  Was symptoms.  Blood pressure appears be fair 140/80 continue to monitor patient needs lipids rechecked which we'll do.  Main issue will be his peripheral edema which and potential etiology for same.    Review of Systems   Cardiovascular: Positive for leg swelling.   All other systems reviewed and are negative.      Objective   Vitals:    06/01/18 1015   BP: 140/80   Pulse: 74   Temp: 98.3 °F (36.8 °C)   SpO2: 98%   Weight: (!) 147 kg (323 lb)     Physical Exam   Constitutional: He appears well-developed and well-nourished.   HENT:   Head: Normocephalic and atraumatic.   Eyes: Conjunctivae are normal. Pupils are equal, round, and reactive to light.   Cardiovascular: Normal rate, regular rhythm and normal heart sounds.    Pulmonary/Chest: Effort normal and breath sounds normal.   Abdominal: Soft. Bowel sounds are normal.   Musculoskeletal:   Legs with trace to 1+ edema bilaterally, negative Homans negative cords both posterior tibial pulses are 2+   Neurological: He is alert.   Unremarkable gait and station   Skin: Skin is warm and dry.   Nursing note and vitals reviewed.      No results found for: INR    Procedures    Assessment/Plan   .  1.  Hypertension plan continue to monitor continue present medicine call for lab results in 3 days time    2.  Leg edema bilaterally venous Doppler and await pending test    3.  Hyperlipidemia await lab if satisfactory recheck in 6 months    4.  Potential STD exposure plan await pending test for further disposition    Much of this encounter note is an electronic transcription/translation of  spoken language to printed text.  The electronic translation of spoken language may permit erroneous, or at times, nonsensical words or phrases to be inadvertently transcribed.  Although I have reviewed the note for such errors, some may still exist. If there are questions or for further clarification, please contact me.

## 2018-06-02 LAB — HSV2 IGG SER IA-ACNC: <0.91 INDEX (ref 0–0.9)

## 2018-06-03 LAB — RPR SER QL: NORMAL

## 2018-06-06 RX ORDER — EZETIMIBE 10 MG/1
TABLET ORAL
Qty: 30 TABLET | Refills: 0 | Status: SHIPPED | OUTPATIENT
Start: 2018-06-06 | End: 2018-09-10 | Stop reason: SDUPTHER

## 2018-06-06 RX ORDER — ZOLPIDEM TARTRATE 10 MG/1
TABLET ORAL
Qty: 30 TABLET | Refills: 0 | Status: SHIPPED | OUTPATIENT
Start: 2018-06-06 | End: 2018-07-11 | Stop reason: SDUPTHER

## 2018-06-06 RX ORDER — NIFEDIPINE 60 MG/1
TABLET, FILM COATED, EXTENDED RELEASE ORAL
Qty: 30 TABLET | Refills: 0 | Status: SHIPPED | OUTPATIENT
Start: 2018-06-06 | End: 2018-06-26 | Stop reason: SDUPTHER

## 2018-06-08 ENCOUNTER — APPOINTMENT (OUTPATIENT)
Dept: CARDIOLOGY | Facility: HOSPITAL | Age: 63
End: 2018-06-08
Attending: INTERNAL MEDICINE

## 2018-06-26 RX ORDER — NIFEDIPINE 60 MG/1
60 TABLET, FILM COATED, EXTENDED RELEASE ORAL DAILY
Qty: 90 TABLET | Refills: 1 | Status: SHIPPED | OUTPATIENT
Start: 2018-06-26 | End: 2018-07-10 | Stop reason: SDUPTHER

## 2018-07-09 ENCOUNTER — TELEPHONE (OUTPATIENT)
Dept: FAMILY MEDICINE CLINIC | Facility: CLINIC | Age: 63
End: 2018-07-09

## 2018-07-09 RX ORDER — NIFEDIPINE 60 MG/1
TABLET, FILM COATED, EXTENDED RELEASE ORAL
Qty: 30 TABLET | Refills: 0 | Status: SHIPPED | OUTPATIENT
Start: 2018-07-09 | End: 2018-07-10 | Stop reason: SDUPTHER

## 2018-07-09 NOTE — TELEPHONE ENCOUNTER
PT STATES HIS PULMONOLOGIST, DR. STEPHANIE DE LA GARZA, DECIDED TO INCREASE HIS RX NIFEdipine FROM 60 MG TO 90 MG.   HE IS ASKING FOR DR. HERRERA TO APPROVE THIS AND ALSO STATED IT NEEDS TO BE A 90 DAY SUPPLY, SENT TO [facility]

## 2018-07-10 RX ORDER — NIFEDIPINE 90 MG/1
90 TABLET, FILM COATED, EXTENDED RELEASE ORAL DAILY
Qty: 90 TABLET | Refills: 0 | Status: SHIPPED | OUTPATIENT
Start: 2018-07-10 | End: 2018-10-07 | Stop reason: SDUPTHER

## 2018-07-11 RX ORDER — ZOLPIDEM TARTRATE 10 MG/1
TABLET ORAL
Qty: 30 TABLET | Refills: 0 | Status: SHIPPED | OUTPATIENT
Start: 2018-07-11 | End: 2018-08-11 | Stop reason: SDUPTHER

## 2018-08-13 RX ORDER — ZOLPIDEM TARTRATE 10 MG/1
10 TABLET ORAL NIGHTLY PRN
Qty: 30 TABLET | Refills: 0 | Status: SHIPPED | OUTPATIENT
Start: 2018-08-13 | End: 2018-09-08 | Stop reason: SDUPTHER

## 2018-08-13 RX ORDER — ZOLPIDEM TARTRATE 10 MG/1
TABLET ORAL
Qty: 30 TABLET | Refills: 0 | Status: SHIPPED | OUTPATIENT
Start: 2018-08-13 | End: 2018-08-13 | Stop reason: SDUPTHER

## 2018-08-24 ENCOUNTER — TELEPHONE (OUTPATIENT)
Dept: FAMILY MEDICINE CLINIC | Facility: CLINIC | Age: 63
End: 2018-08-24

## 2018-08-24 NOTE — TELEPHONE ENCOUNTER
PATIENT IS AT [facility] AND HAS A STINT PUT IN HIS HEART     PATIENT NEEDS TO TALK TO YOU ABOUT HIS NECK AND HANDS PATIENT IS IN A LOT OF PAIN AND NEEDS A REFERRAL

## 2018-08-24 NOTE — TELEPHONE ENCOUNTER
Patient informed we cannot do anything u ntil he is seen for these problems.  He can have them addressed while in the hospital or he can just make a follow up appt with us.[email or web address]

## 2018-09-06 ENCOUNTER — OFFICE VISIT (OUTPATIENT)
Dept: FAMILY MEDICINE CLINIC | Facility: CLINIC | Age: 63
End: 2018-09-06

## 2018-09-06 VITALS
HEIGHT: 72 IN | OXYGEN SATURATION: 96 % | SYSTOLIC BLOOD PRESSURE: 160 MMHG | WEIGHT: 315 LBS | DIASTOLIC BLOOD PRESSURE: 78 MMHG | TEMPERATURE: 98.5 F | BODY MASS INDEX: 42.66 KG/M2 | HEART RATE: 76 BPM

## 2018-09-06 DIAGNOSIS — R07.9 CHEST PAIN, UNSPECIFIED TYPE: ICD-10-CM

## 2018-09-06 DIAGNOSIS — E87.6 HYPOKALEMIA: ICD-10-CM

## 2018-09-06 DIAGNOSIS — I10 HYPERTENSION, ESSENTIAL: ICD-10-CM

## 2018-09-06 DIAGNOSIS — I25.10 CORONARY ARTERY DISEASE INVOLVING NATIVE CORONARY ARTERY OF NATIVE HEART WITHOUT ANGINA PECTORIS: Primary | ICD-10-CM

## 2018-09-06 LAB
ANION GAP SERPL CALCULATED.3IONS-SCNC: 12.2 MMOL/L
BUN BLD-MCNC: 14 MG/DL (ref 8–23)
BUN/CREAT SERPL: 12.5 (ref 7–25)
CALCIUM SPEC-SCNC: 9.8 MG/DL (ref 8.6–10.5)
CHLORIDE SERPL-SCNC: 102 MMOL/L (ref 98–107)
CO2 SERPL-SCNC: 30.8 MMOL/L (ref 22–29)
CREAT BLD-MCNC: 1.12 MG/DL (ref 0.76–1.27)
GFR SERPL CREATININE-BSD FRML MDRD: 80 ML/MIN/1.73
GLUCOSE BLD-MCNC: 99 MG/DL (ref 65–99)
POTASSIUM BLD-SCNC: 4.1 MMOL/L (ref 3.5–5.2)
SODIUM BLD-SCNC: 145 MMOL/L (ref 136–145)

## 2018-09-06 PROCEDURE — 80048 BASIC METABOLIC PNL TOTAL CA: CPT | Performed by: INTERNAL MEDICINE

## 2018-09-06 PROCEDURE — 36415 COLL VENOUS BLD VENIPUNCTURE: CPT | Performed by: INTERNAL MEDICINE

## 2018-09-06 PROCEDURE — 93000 ELECTROCARDIOGRAM COMPLETE: CPT | Performed by: INTERNAL MEDICINE

## 2018-09-06 PROCEDURE — 99214 OFFICE O/P EST MOD 30 MIN: CPT | Performed by: INTERNAL MEDICINE

## 2018-09-06 RX ORDER — HYDROCODONE BITARTRATE AND ACETAMINOPHEN 5; 325 MG/1; MG/1
TABLET ORAL
COMMUNITY
Start: 2018-08-26 | End: 2019-02-21

## 2018-09-06 RX ORDER — ASPIRIN 81 MG/1
TABLET, COATED ORAL
COMMUNITY
Start: 2018-08-26 | End: 2018-09-24 | Stop reason: SDUPTHER

## 2018-09-06 RX ORDER — METOPROLOL TARTRATE 50 MG/1
50 TABLET, FILM COATED ORAL
COMMUNITY
Start: 2018-08-25 | End: 2018-09-24 | Stop reason: SDUPTHER

## 2018-09-06 RX ORDER — TICAGRELOR 90 MG/1
TABLET ORAL
COMMUNITY
Start: 2018-08-26 | End: 2018-09-24 | Stop reason: SDUPTHER

## 2018-09-06 RX ORDER — GABAPENTIN 300 MG/1
CAPSULE ORAL
COMMUNITY
Start: 2018-08-26 | End: 2018-10-04

## 2018-09-06 RX ORDER — PREDNISONE 20 MG/1
TABLET ORAL
COMMUNITY
Start: 2018-08-29 | End: 2019-02-21 | Stop reason: SDUPTHER

## 2018-09-06 NOTE — PROGRESS NOTES
Subjective   Maik Lara is a 63 y.o. male.   Here follow-up status post hospitalization with do diagnosis coronary disease requiring stenting of circumflex vessel 85% lesion also low potassium during hospital stay  History of Present Illness   As above has neck pain with radiation to left arm no cervical disc issues does seem to exacerbate because referred pain from neck gets is radiating to his chest also when reality was an unstable angina resulting in heart catheter and stenting of left circumflex lesser disease mirroring vessel also not requiring stenting patient has still has neck pain has had imaging of his neck which I'm unable to pull up insist this point in time fairly recently as description he has either disc or disc osteophyte type issue blood pressure is slightly elevated we'll have him checked at home upper adjust meds if necessary.  He also has slight low potassium during hospital stay we'll get updated electrolytes to optimize potassium and some eye with recent setting of coronary disease with stenting.  Patient does have some minimal chest discomfort did not feel quite like his heart when he went before we will nonetheless get EKG and do a troponin on him stat.  New drug allergy listed as Benicar/amlodipine reaction not explained.  Coronary disease new diagnosis for patient patient remains a nonsmoker without reported heavy EtOH.  Family history positive for coronary disease in mother  .  Review of Systems   All other systems reviewed and are negative.      Objective   Vitals:    09/06/18 1503   BP: 160/78   Pulse: 76   Temp: 98.5 °F (36.9 °C)   SpO2: 96%   Weight: (!) 148 kg (326 lb 3.2 oz)     Physical Exam   Constitutional: He appears well-developed and well-nourished.   HENT:   Head: Normocephalic and atraumatic.   Eyes: Pupils are equal, round, and reactive to light. Conjunctivae are normal.   Neck:   No carotid bruits   Cardiovascular: Normal rate, regular rhythm and normal heart sounds.     Left and right carotid pulses are 2+   Pulmonary/Chest: Effort normal and breath sounds normal.   Abdominal: Soft. Bowel sounds are normal.   Neurological: He is alert.   Unremarkable gait and station   Skin: Skin is warm and dry.   Nursing note and vitals reviewed.      No results found for: INR      ECG 12 Lead  Date/Time: 9/6/2018 3:34 PM  Performed by: JOSE ELIAS HERRERA JR  Authorized by: JOSE ELIAS HERRERA JR   Comparison: compared with previous ECG   Comparison to previous ECG: There is no true EKG image comparison to have report from 8/24/1840 masses ST-T wave changes PVCs present.  Endocrine nectar conduction defect noted.  Again no actual visual representation.  Rhythm: sinus rhythm  T depression: aVL  Clinical impression: dysrhythmia - atrial  Comments: EKG today shows sinus rhythm with a rate of 66 T-wave inversion present in aVL.  We have a minimal IVCD with a QRS of 103 MS ID interval is 167 MS and QT intervals 397 MS.            Assessment/Plan   1.  Coronary disease with recent stent plan continue present medicine    2.  Chest pain unspecified we'll get a stat troponin today EKG appears unremarkable further follow-up contingent on pending troponin patient also is scheduled to see his cardiologist follow-up    3.  Hypertension patient is get several blood pressure readings at home with elevated upper adjust his blood pressure medicine he is on metoprolol now.    4.  Hypokalemia await pending lab if satisfactory we'll follow-up with patient in 3 months time    Much of this encounter note is an electronic transcription/translation of spoken language to printed text.  The electronic translation of spoken language may permit erroneous, or at times, nonsensical words or phrases to be inadvertently transcribed.  Although I have reviewed the note for such errors, some may still exist. If there are questions or for further clarification, please contact me.

## 2018-09-10 LAB — TROPONIN T SERPL-MCNC: <0.01 NG/ML (ref 0–0.03)

## 2018-09-10 RX ORDER — ZOLPIDEM TARTRATE 10 MG/1
TABLET ORAL
Qty: 30 TABLET | Refills: 0 | Status: SHIPPED | OUTPATIENT
Start: 2018-09-10 | End: 2018-10-13 | Stop reason: SDUPTHER

## 2018-09-10 RX ORDER — EZETIMIBE 10 MG/1
10 TABLET ORAL DAILY
Qty: 30 TABLET | Refills: 5 | Status: SHIPPED | OUTPATIENT
Start: 2018-09-10 | End: 2019-08-01 | Stop reason: SDUPTHER

## 2018-09-13 RX ORDER — OMEPRAZOLE 40 MG/1
CAPSULE, DELAYED RELEASE ORAL
Qty: 90 CAPSULE | Refills: 1 | Status: SHIPPED | OUTPATIENT
Start: 2018-09-13 | End: 2019-03-10 | Stop reason: SDUPTHER

## 2018-09-13 RX ORDER — FLUTICASONE PROPIONATE 50 MCG
SPRAY, SUSPENSION (ML) NASAL
Qty: 48 G | Refills: 3 | Status: SHIPPED | OUTPATIENT
Start: 2018-09-13 | End: 2019-08-01 | Stop reason: SDUPTHER

## 2018-09-24 ENCOUNTER — TELEPHONE (OUTPATIENT)
Dept: FAMILY MEDICINE CLINIC | Facility: CLINIC | Age: 63
End: 2018-09-24

## 2018-09-24 RX ORDER — TICAGRELOR 90 MG/1
90 TABLET ORAL EVERY 12 HOURS SCHEDULED
Qty: 60 TABLET | Refills: 5 | Status: SHIPPED | OUTPATIENT
Start: 2018-09-24 | End: 2019-04-10 | Stop reason: SDUPTHER

## 2018-09-24 RX ORDER — ASPIRIN 81 MG/1
81 TABLET, COATED ORAL DAILY
Qty: 30 TABLET | Refills: 5 | Status: SHIPPED | OUTPATIENT
Start: 2018-09-24 | End: 2019-03-19 | Stop reason: SDUPTHER

## 2018-09-24 RX ORDER — METOPROLOL TARTRATE 50 MG/1
50 TABLET, FILM COATED ORAL EVERY 12 HOURS SCHEDULED
Qty: 60 TABLET | Refills: 5 | Status: SHIPPED | OUTPATIENT
Start: 2018-09-24 | End: 2019-04-10 | Stop reason: SDUPTHER

## 2018-09-24 NOTE — TELEPHONE ENCOUNTER
BENJAMÍN needs contract for Neuronitin.  Theo done toda.  All others were refilled.[facility]

## 2018-09-24 NOTE — TELEPHONE ENCOUNTER
REFILL ON BRILINTA 90 MG tablet tablet  ASPIRIN ADULT LOW DOSE 81 MG EC tablet  metoprolol tartrate (LOPRESSOR) 50 MG tablet  gabapentin (NEURONTIN) 300 MG capsule      PATIENT GOT THIS RX'S WHEN HE WAS IN THE HOSPITAL. PATIENT NEEDS ALL RX'S REFILLED PLEASE

## 2018-09-26 ENCOUNTER — TELEPHONE (OUTPATIENT)
Dept: FAMILY MEDICINE CLINIC | Facility: CLINIC | Age: 63
End: 2018-09-26

## 2018-09-26 NOTE — TELEPHONE ENCOUNTER
PT STS THAT WITH NEW DRUG CONTRACT FILLED OUT HE NEEDS GABAPENTIN 300 MG AND HYDROCODONE 325 MG  PLEASE CALL INTO HIS PHARMACY.PLEASE CALL HIM [phone] IF THERE IS A PROBLEM..

## 2018-09-28 ENCOUNTER — TELEPHONE (OUTPATIENT)
Dept: FAMILY MEDICINE CLINIC | Facility: CLINIC | Age: 63
End: 2018-09-28

## 2018-09-28 NOTE — TELEPHONE ENCOUNTER
Pt states he had heart surgery 8/21/2018 by Dr. Coleman he was prescribed the gabapentin and hydrocodone after his surgery. Pt does not even know why he was given the gabapentin.

## 2018-10-02 ENCOUNTER — TELEPHONE (OUTPATIENT)
Dept: FAMILY MEDICINE CLINIC | Facility: CLINIC | Age: 63
End: 2018-10-02

## 2018-10-02 NOTE — TELEPHONE ENCOUNTER
Please check this out I do have that Dr. Mireles is reported as is prescribing and on a 20/6/18 please pull up current Theo so we can ascertain who is doing this

## 2018-10-03 NOTE — TELEPHONE ENCOUNTER
Pt saw Dr. Umanzor in the hospital and was given the gabapentin the one time from him. He will need refills from you.

## 2018-10-04 NOTE — TELEPHONE ENCOUNTER
Pt states he does not want refill on this medication now. States he has been off of it for a few days and has decided not to take it anymore

## 2018-10-08 RX ORDER — NIFEDIPINE 90 MG/1
TABLET, EXTENDED RELEASE ORAL
Qty: 90 TABLET | Refills: 0 | Status: SHIPPED | OUTPATIENT
Start: 2018-10-08 | End: 2019-01-09 | Stop reason: SDUPTHER

## 2018-10-15 RX ORDER — TRAMADOL HYDROCHLORIDE 50 MG/1
TABLET ORAL
Qty: 60 TABLET | Refills: 0 | Status: SHIPPED | OUTPATIENT
Start: 2018-10-15 | End: 2018-11-19 | Stop reason: SDUPTHER

## 2018-10-15 RX ORDER — ZOLPIDEM TARTRATE 10 MG/1
TABLET ORAL
Qty: 30 TABLET | Refills: 0 | Status: SHIPPED | OUTPATIENT
Start: 2018-10-15 | End: 2018-11-13 | Stop reason: SDUPTHER

## 2018-10-26 RX ORDER — TRAMADOL HYDROCHLORIDE 50 MG/1
TABLET ORAL
Qty: 60 TABLET | Refills: 0 | OUTPATIENT
Start: 2018-10-26

## 2018-10-26 NOTE — TELEPHONE ENCOUNTER
Don't see that we have a contract for Ultram he had been getting pain medicine from an orthopedic surgeon so we'll need to get a contract first give me a current Theo to see where we are with him

## 2018-10-30 ENCOUNTER — OFFICE VISIT (OUTPATIENT)
Dept: FAMILY MEDICINE CLINIC | Facility: CLINIC | Age: 63
End: 2018-10-30

## 2018-10-30 VITALS
HEIGHT: 72 IN | TEMPERATURE: 98.6 F | HEART RATE: 79 BPM | SYSTOLIC BLOOD PRESSURE: 110 MMHG | BODY MASS INDEX: 42.66 KG/M2 | DIASTOLIC BLOOD PRESSURE: 70 MMHG | OXYGEN SATURATION: 98 % | WEIGHT: 315 LBS

## 2018-10-30 DIAGNOSIS — I10 HYPERTENSION, ESSENTIAL: ICD-10-CM

## 2018-10-30 DIAGNOSIS — R60.0 LEG EDEMA: ICD-10-CM

## 2018-10-30 DIAGNOSIS — E78.5 ELEVATED LIPIDS: ICD-10-CM

## 2018-10-30 DIAGNOSIS — M54.2 NECK PAIN: ICD-10-CM

## 2018-10-30 DIAGNOSIS — L30.9 DERMATITIS: Primary | ICD-10-CM

## 2018-10-30 LAB
ALBUMIN SERPL-MCNC: 4.2 G/DL (ref 3.5–5.2)
ALBUMIN/GLOB SERPL: 1.2 G/DL
ALP SERPL-CCNC: 71 U/L (ref 39–117)
ALT SERPL W P-5'-P-CCNC: 11 U/L (ref 1–41)
ANION GAP SERPL CALCULATED.3IONS-SCNC: 8.9 MMOL/L
AST SERPL-CCNC: 17 U/L (ref 1–40)
BACTERIA UR QL AUTO: ABNORMAL /HPF
BILIRUB SERPL-MCNC: 0.3 MG/DL (ref 0.1–1.2)
BILIRUB UR QL STRIP: NEGATIVE
BUN BLD-MCNC: 13 MG/DL (ref 8–23)
BUN/CREAT SERPL: 13.4 (ref 7–25)
CALCIUM SPEC-SCNC: 9.7 MG/DL (ref 8.6–10.5)
CHLORIDE SERPL-SCNC: 104 MMOL/L (ref 98–107)
CHOLEST SERPL-MCNC: 181 MG/DL (ref 0–200)
CLARITY UR: CLEAR
CO2 SERPL-SCNC: 30.1 MMOL/L (ref 22–29)
COLOR UR: YELLOW
CREAT BLD-MCNC: 0.97 MG/DL (ref 0.76–1.27)
GFR SERPL CREATININE-BSD FRML MDRD: 94 ML/MIN/1.73
GLOBULIN UR ELPH-MCNC: 3.5 GM/DL
GLUCOSE BLD-MCNC: 102 MG/DL (ref 65–99)
GLUCOSE UR STRIP-MCNC: NEGATIVE MG/DL
HDLC SERPL-MCNC: 42 MG/DL (ref 40–60)
HGB UR QL STRIP.AUTO: NEGATIVE
KETONES UR QL STRIP: NEGATIVE
LDLC SERPL CALC-MCNC: 110 MG/DL (ref 0–100)
LDLC/HDLC SERPL: 2.61 {RATIO}
LEUKOCYTE ESTERASE UR QL STRIP.AUTO: NEGATIVE
NITRITE UR QL STRIP: NEGATIVE
PH UR STRIP.AUTO: 7 [PH] (ref 4.6–8)
POTASSIUM BLD-SCNC: 4 MMOL/L (ref 3.5–5.2)
PROT SERPL-MCNC: 7.7 G/DL (ref 6–8.5)
PROT UR QL STRIP: ABNORMAL
RBC # UR: ABNORMAL /HPF
REF LAB TEST METHOD: ABNORMAL
SODIUM BLD-SCNC: 143 MMOL/L (ref 136–145)
SP GR UR STRIP: 1.02 (ref 1–1.03)
SPERM URNS QL MICRO: ABNORMAL /HPF
SQUAMOUS #/AREA URNS HPF: ABNORMAL /HPF
TRIGL SERPL-MCNC: 147 MG/DL (ref 0–150)
UROBILINOGEN UR QL STRIP: ABNORMAL
VLDLC SERPL-MCNC: 29.4 MG/DL (ref 5–40)
WBC UR QL AUTO: ABNORMAL /HPF

## 2018-10-30 PROCEDURE — 99214 OFFICE O/P EST MOD 30 MIN: CPT | Performed by: INTERNAL MEDICINE

## 2018-10-30 PROCEDURE — 90472 IMMUNIZATION ADMIN EACH ADD: CPT | Performed by: INTERNAL MEDICINE

## 2018-10-30 PROCEDURE — 90674 CCIIV4 VAC NO PRSV 0.5 ML IM: CPT | Performed by: INTERNAL MEDICINE

## 2018-10-30 PROCEDURE — 81001 URINALYSIS AUTO W/SCOPE: CPT | Performed by: INTERNAL MEDICINE

## 2018-10-30 PROCEDURE — 90471 IMMUNIZATION ADMIN: CPT | Performed by: INTERNAL MEDICINE

## 2018-10-30 PROCEDURE — 36415 COLL VENOUS BLD VENIPUNCTURE: CPT | Performed by: INTERNAL MEDICINE

## 2018-10-30 PROCEDURE — 80053 COMPREHEN METABOLIC PANEL: CPT | Performed by: INTERNAL MEDICINE

## 2018-10-30 PROCEDURE — 90670 PCV13 VACCINE IM: CPT | Performed by: INTERNAL MEDICINE

## 2018-10-30 PROCEDURE — 80061 LIPID PANEL: CPT | Performed by: INTERNAL MEDICINE

## 2018-10-30 RX ORDER — CLOTRIMAZOLE AND BETAMETHASONE DIPROPIONATE 10; .64 MG/G; MG/G
CREAM TOPICAL EVERY 12 HOURS SCHEDULED
Qty: 60 G | Refills: 1 | Status: SHIPPED | OUTPATIENT
Start: 2018-10-30 | End: 2019-08-01 | Stop reason: SDUPTHER

## 2018-10-30 NOTE — PROGRESS NOTES
Subjective   Maik Lara is a 63 y.o. male.   Patient needs flu vaccine also has rash arms and legs also has been getting hydrocodone for back pain switch to tramadol by us which is effective will need to remain with this or surgeon for neck pain with possible surgery for same.  November of this year.  History of Present Illness rash on arms an legs without obvious reason no new medicines patient's neurosurgeon Dr. velasquez neurosurg , And dr cardona with Dr. Tan   prob surg nov patient was assisting getting second opinion we'll initiate referral for that also.  Rash to suggest a small bumps have not changed dramatically.  New changes in the environment no cardiac complaints since his recent cardiac presentation with angioplasty.  Patient wants flu shot Pt to get his pain  meds fromhis neurologist or neurosurgeon For cervical pain .  He Sees them soon patient leaves he had 2 stents placed from chest pain presentation and heart catheter on 8/23/18.  Recent leg edema also soft.,  Cipro hops oil causing swelling and Benzapril causing angioedema drug allergies patient is nonsmoker nondrinker family history of CAD in mother  Review of Systems   Skin: Positive for rash.   All other systems reviewed and are negative.      Objective   Vitals:    10/30/18 1054   BP: 110/70   Pulse: 79   Temp: 98.6 °F (37 °C)   SpO2: 98%   Weight: (!) 151 kg (331 lb 12.8 oz)     Physical Exam   Constitutional: He appears well-developed and well-nourished.   HENT:   Head: Normocephalic and atraumatic.   Eyes: Pupils are equal, round, and reactive to light. Conjunctivae are normal.   Cardiovascular: Normal rate, regular rhythm and normal heart sounds.    Pulmonary/Chest: Effort normal and breath sounds normal.   Abdominal: Soft. Bowel sounds are normal.   Musculoskeletal:   Legs with 1+ edema distal lower leg and ankles   Neurological: He is alert.   Unremarkable gait and station   Skin: Skin is warm and dry.   Rash present minimally  palpable rash or both arms intermittently as well as anterior thighs present etiology not clear will have patient try Lotrisone does not resolve problem will see dermatologist   Nursing note and vitals reviewed.      No results found for: INR    Procedures    Assessment/Plan   1.  Dermatitis plan Lotrisone to area twice a day not successful weeks time sent to dermatologist choice    2.  Cervical pain with this plan second opinion with Dr. Millan or neurosurgeon of choice.    3.  Hyperlipidemia get updated values patient's been historically has to taking statins are in the setting of rhabdomyolysis with incomplete recovery of strength await pending lab    4.  Hypertension controlled continue present medicine    5.  Bilateral leg edema await pending lab for further disposition.    Much of this encounter note is an electronic transcription/translation of spoken language to printed text.  The electronic translation of spoken language may permit erroneous, or at times, nonsensical words or phrases to be inadvertently transcribed.  Although I have reviewed the note for such errors, some may still exist. If there are questions or for further clarification, please contact me.        lotrisone

## 2018-10-30 NOTE — PROGRESS NOTES
Subjective   Maik Lara is a 63 y.o. male.     History of Present Illness       Review of Systems    Objective   Vitals:    10/30/18 1054   BP: 110/70   Pulse: 79   Temp: 98.6 °F (37 °C)   SpO2: 98%   Weight: (!) 151 kg (331 lb 12.8 oz)     Physical Exam    No results found for: INR    Procedures    Assessment/Plan

## 2018-11-01 DIAGNOSIS — R80.9 PROTEINURIA, UNSPECIFIED TYPE: Primary | ICD-10-CM

## 2018-11-06 RX ORDER — TRAMADOL HYDROCHLORIDE 50 MG/1
TABLET ORAL
Qty: 60 TABLET | Refills: 0 | OUTPATIENT
Start: 2018-11-06

## 2018-11-06 NOTE — TELEPHONE ENCOUNTER
Check with patient thought he was getting his pain medicine from his neurosurgeon, if that is the case then we simply decline

## 2018-11-15 ENCOUNTER — TELEPHONE (OUTPATIENT)
Dept: FAMILY MEDICINE CLINIC | Facility: CLINIC | Age: 63
End: 2018-11-15

## 2018-11-15 RX ORDER — ZOLPIDEM TARTRATE 10 MG/1
TABLET ORAL
Qty: 30 TABLET | Refills: 0 | Status: SHIPPED | OUTPATIENT
Start: 2018-11-15 | End: 2018-12-13 | Stop reason: SDUPTHER

## 2018-11-15 NOTE — TELEPHONE ENCOUNTER
PATIENT HAS AN APPOINTMENT WITH NEUROSURGERY FOR 03/11/2019. PATIENT THINKS ITS FOR HIS HEART ATTACK. AND WANTS TO SEE IF HE CAN GET SOMETHING SOONER

## 2018-11-16 NOTE — TELEPHONE ENCOUNTER
This needs to be sent to the provider, he thinks he is going to the neurosurgeon related to a heart attack? Does he need to be seen sooner than March for the neurosurgeon? If so do we need to switch neurosurgeon's because this is his provider and they have access to his chart and still scheduled him for next March.  The patient was also very confused about his care, with his other specialists, the phone call was transferred for a message but all the info does not seem to have been written down.

## 2018-11-16 NOTE — TELEPHONE ENCOUNTER
Patient should follow-up with his cardiologist regarding his heart attack if doubt check with the cardiologist or look at the discharge summary) he was scheduled follow-up with cardiology.  regarding the neurosurgeon I am unclear on that.

## 2018-11-21 RX ORDER — TRAMADOL HYDROCHLORIDE 50 MG/1
TABLET ORAL
Qty: 60 TABLET | Refills: 0 | Status: SHIPPED | OUTPATIENT
Start: 2018-11-21 | End: 2022-01-01 | Stop reason: SDUPTHER

## 2018-12-13 RX ORDER — ZOLPIDEM TARTRATE 10 MG/1
TABLET ORAL
Qty: 30 TABLET | Refills: 0 | Status: SHIPPED | OUTPATIENT
Start: 2018-12-13 | End: 2019-01-13 | Stop reason: SDUPTHER

## 2019-01-09 RX ORDER — NIFEDIPINE 90 MG/1
TABLET, EXTENDED RELEASE ORAL
Qty: 90 TABLET | Refills: 0 | Status: SHIPPED | OUTPATIENT
Start: 2019-01-09 | End: 2019-04-12 | Stop reason: SDUPTHER

## 2019-01-14 RX ORDER — ZOLPIDEM TARTRATE 10 MG/1
TABLET ORAL
Qty: 30 TABLET | Refills: 0 | Status: SHIPPED | OUTPATIENT
Start: 2019-01-14 | End: 2019-02-12 | Stop reason: SDUPTHER

## 2019-01-21 RX ORDER — VENLAFAXINE HYDROCHLORIDE 75 MG/1
CAPSULE, EXTENDED RELEASE ORAL
Qty: 360 CAPSULE | Refills: 3 | Status: SHIPPED | OUTPATIENT
Start: 2019-01-21 | End: 2019-08-01 | Stop reason: SDUPTHER

## 2019-01-21 RX ORDER — SPIRONOLACTONE 25 MG/1
TABLET ORAL
Qty: 90 TABLET | Refills: 3 | Status: SHIPPED | OUTPATIENT
Start: 2019-01-21 | End: 2021-01-01

## 2019-01-21 RX ORDER — ALLOPURINOL 300 MG/1
TABLET ORAL
Qty: 90 TABLET | Refills: 3 | Status: SHIPPED | OUTPATIENT
Start: 2019-01-21 | End: 2019-08-01 | Stop reason: SDUPTHER

## 2019-02-12 RX ORDER — EZETIMIBE 10 MG/1
TABLET ORAL
Qty: 30 TABLET | Refills: 0 | Status: SHIPPED | OUTPATIENT
Start: 2019-02-12 | End: 2019-02-21 | Stop reason: SDUPTHER

## 2019-02-12 RX ORDER — ZOLPIDEM TARTRATE 10 MG/1
TABLET ORAL
Qty: 30 TABLET | Refills: 0 | Status: SHIPPED | OUTPATIENT
Start: 2019-02-12 | End: 2019-03-14

## 2019-02-21 ENCOUNTER — OFFICE VISIT (OUTPATIENT)
Dept: FAMILY MEDICINE CLINIC | Facility: CLINIC | Age: 64
End: 2019-02-21

## 2019-02-21 VITALS
OXYGEN SATURATION: 98 % | DIASTOLIC BLOOD PRESSURE: 80 MMHG | HEIGHT: 72 IN | HEART RATE: 66 BPM | WEIGHT: 315 LBS | SYSTOLIC BLOOD PRESSURE: 122 MMHG | BODY MASS INDEX: 42.66 KG/M2 | TEMPERATURE: 98.2 F

## 2019-02-21 DIAGNOSIS — M25.552 ACUTE HIP PAIN, LEFT: ICD-10-CM

## 2019-02-21 DIAGNOSIS — M25.571 ACUTE RIGHT ANKLE PAIN: ICD-10-CM

## 2019-02-21 DIAGNOSIS — I10 HYPERTENSION, ESSENTIAL: ICD-10-CM

## 2019-02-21 DIAGNOSIS — E78.5 HYPERLIPIDEMIA, UNSPECIFIED HYPERLIPIDEMIA TYPE: ICD-10-CM

## 2019-02-21 DIAGNOSIS — M79.89 LEG SWELLING: Primary | ICD-10-CM

## 2019-02-21 LAB
ALBUMIN SERPL-MCNC: 4.2 G/DL (ref 3.5–5.2)
ALBUMIN/GLOB SERPL: 1.1 G/DL
ALP SERPL-CCNC: 69 U/L (ref 39–117)
ALT SERPL W P-5'-P-CCNC: 14 U/L (ref 1–41)
ANION GAP SERPL CALCULATED.3IONS-SCNC: 11.2 MMOL/L
AST SERPL-CCNC: 17 U/L (ref 1–40)
BILIRUB SERPL-MCNC: 0.3 MG/DL (ref 0.1–1.2)
BUN BLD-MCNC: 14 MG/DL (ref 8–23)
BUN/CREAT SERPL: 12 (ref 7–25)
CALCIUM SPEC-SCNC: 9.7 MG/DL (ref 8.6–10.5)
CHLORIDE SERPL-SCNC: 101 MMOL/L (ref 98–107)
CHOLEST SERPL-MCNC: 188 MG/DL (ref 0–200)
CO2 SERPL-SCNC: 30.8 MMOL/L (ref 22–29)
CREAT BLD-MCNC: 1.17 MG/DL (ref 0.76–1.27)
GFR SERPL CREATININE-BSD FRML MDRD: 76 ML/MIN/1.73
GLOBULIN UR ELPH-MCNC: 3.7 GM/DL
GLUCOSE BLD-MCNC: 92 MG/DL (ref 65–99)
HDLC SERPL-MCNC: 43 MG/DL (ref 40–60)
LDLC SERPL CALC-MCNC: 127 MG/DL (ref 0–100)
LDLC/HDLC SERPL: 2.95 {RATIO}
POTASSIUM BLD-SCNC: 3.9 MMOL/L (ref 3.5–5.2)
PROT SERPL-MCNC: 7.9 G/DL (ref 6–8.5)
SODIUM BLD-SCNC: 143 MMOL/L (ref 136–145)
TRIGL SERPL-MCNC: 91 MG/DL (ref 0–150)
VLDLC SERPL-MCNC: 18.2 MG/DL (ref 5–40)

## 2019-02-21 PROCEDURE — 73590 X-RAY EXAM OF LOWER LEG: CPT | Performed by: INTERNAL MEDICINE

## 2019-02-21 PROCEDURE — 36415 COLL VENOUS BLD VENIPUNCTURE: CPT | Performed by: INTERNAL MEDICINE

## 2019-02-21 PROCEDURE — 99214 OFFICE O/P EST MOD 30 MIN: CPT | Performed by: INTERNAL MEDICINE

## 2019-02-21 PROCEDURE — 73610 X-RAY EXAM OF ANKLE: CPT | Performed by: INTERNAL MEDICINE

## 2019-02-21 PROCEDURE — 73502 X-RAY EXAM HIP UNI 2-3 VIEWS: CPT | Performed by: INTERNAL MEDICINE

## 2019-02-21 PROCEDURE — 80053 COMPREHEN METABOLIC PANEL: CPT | Performed by: INTERNAL MEDICINE

## 2019-02-21 PROCEDURE — 80061 LIPID PANEL: CPT | Performed by: INTERNAL MEDICINE

## 2019-02-21 RX ORDER — CLOTRIMAZOLE AND BETAMETHASONE DIPROPIONATE 10; .64 MG/G; MG/G
CREAM TOPICAL
Qty: 120 G | Refills: 1 | Status: SHIPPED | OUTPATIENT
Start: 2019-02-21 | End: 2019-08-01 | Stop reason: SDUPTHER

## 2019-02-21 RX ORDER — METOPROLOL TARTRATE 50 MG/1
TABLET, FILM COATED ORAL
COMMUNITY
Start: 2019-01-26 | End: 2019-08-01 | Stop reason: SDUPTHER

## 2019-02-21 NOTE — PROGRESS NOTES
Subjective   Maik Lara is a 63 y.o. male.   Ongoing discomfort swelling right ankle right lower leg.  The left lower leg starting to the same.  No pronounced pain with is just more of a soreness.  Patient does have a breakdown of his skin mild on the inside of his lower leg medially did have a venous Doppler done but limited imaging below the knee but no evidence of DVT by their description.  History of Present Illness   Breakdown swelling soreness in the lower legs right more so than left.  Unremarkable venous Doppler from last year.  Ongoing change no clearly exacerbating factors no clear-cut drainage from site either also some somewhat recent left hip pain pain no locking or giving sensation noted follow-up on blood pressure was satisfactory needs lipids rechecked as well with medication he currently takes Zetia he has been hesitant to try any statin due to past history of rhabdomyolysis.  Patient is a non-smoker family history for heart disease.    Several drug allergies including amlodipine Benzapril which causes swelling Benzapril by itself causes angioedema Cipro causes swelling hops oil l unspecified sulfa antibiotics unspecified  Review of Systems   Skin: Positive for rash.   All other systems reviewed and are negative.      Objective   Vitals:    02/21/19 0754   BP: 122/80   Pulse: 66   Temp: 98.2 °F (36.8 °C)   SpO2: 98%   Weight: (!) 147 kg (323 lb 9.6 oz)     Physical Exam   Constitutional: He appears well-developed and well-nourished.   HENT:   Head: Normocephalic and atraumatic.   Eyes: Conjunctivae are normal. Pupils are equal, round, and reactive to light.   Cardiovascular: Normal rate, regular rhythm and normal heart sounds.   Pulmonary/Chest: Effort normal and breath sounds normal.   Abdominal: Soft. Bowel sounds are normal.   Musculoskeletal:   Left hip nontender to palpation.  No pain with external rotation lower leg with extension.  Patient has good external rotation without pain does have  bowel sounds with internal rotation of the left hip tends to be tight joint as well.  Right posterior 1+ and dorsalis pedis trace.  Left posterior and dorsalis pedis pulses are 2+ bilaterally.  Minimal swelling discoloration of skin superior to the right medial malleolus.  No overt infection and no focal tenderness noted over foot or ankle.   Neurological: He is alert.   Unremarkable e gait and station   Skin: Skin is warm and dry.   Nursing note and vitals reviewed.      No results found for: INR    Procedures  X-ray left hip 2 views obtained.  Showed mild narrowing of the acetabular rim or inferior portion.  No comparison x-ray available..  No other bony soft tissue balance is noted.      X-ray right ankle no acute bony or soft tissue normalities noted there is no comparison available.    X-ray right tib-fib again no acute bony or soft tissue normalities are noted.  Of note I do have a comparison from 11/17/2015 without changes noted.  Assessment/Plan     1.  Leg swelling bilaterally plan refer to vascular surgeon for evaluation    2.  Hypertension controlled continue present medicine    3.  Hyperlipidemia await pending lab if good recheck 6 months    4.  Acute left hip pain plan refer to Dr. Rodriguez orthopedic surgeon of choice.  Disc of x-ray som with patient    5.  Right ankle and lower leg pain planus and leg swelling refer to vascular surgeon Dr. Yudy Diaz for further evaluation   Much of this encounter note is an electronic transcription/translation of spoken language to printed text.  The electronic translation of spoken language may permit erroneous, or at times, nonsensical words or phrases to be inadvertently transcribed.  Although I have reviewed the note for such errors, some may still exist. If there are questions or for further clarification, please contact me.

## 2019-03-11 RX ORDER — OMEPRAZOLE 40 MG/1
CAPSULE, DELAYED RELEASE ORAL
Qty: 90 CAPSULE | Refills: 1 | Status: SHIPPED | OUTPATIENT
Start: 2019-03-11 | End: 2019-08-01 | Stop reason: SDUPTHER

## 2019-03-11 RX ORDER — CITALOPRAM 40 MG/1
TABLET ORAL
Qty: 90 TABLET | Refills: 3 | Status: SHIPPED | OUTPATIENT
Start: 2019-03-11 | End: 2019-08-01 | Stop reason: SDUPTHER

## 2019-03-13 RX ORDER — ZOLPIDEM TARTRATE 10 MG/1
TABLET ORAL
Qty: 30 TABLET | Refills: 0 | OUTPATIENT
Start: 2019-03-13

## 2019-03-13 NOTE — TELEPHONE ENCOUNTER
Called and lvm to inform pt he needs to come in and sign drug contract, do uds. viv is up to date.

## 2019-03-14 RX ORDER — ZOLPIDEM TARTRATE 10 MG/1
10 TABLET ORAL NIGHTLY PRN
Qty: 30 TABLET | Refills: 0 | Status: SHIPPED | OUTPATIENT
Start: 2019-03-14 | End: 2019-04-11 | Stop reason: SDUPTHER

## 2019-03-19 RX ORDER — ASPIRIN 81 MG/1
TABLET, COATED ORAL
Qty: 30 TABLET | Refills: 4 | Status: SHIPPED | OUTPATIENT
Start: 2019-03-19 | End: 2019-08-01 | Stop reason: SDUPTHER

## 2019-04-10 RX ORDER — TICAGRELOR 90 MG/1
TABLET ORAL
Qty: 60 TABLET | Refills: 4 | Status: SHIPPED | OUTPATIENT
Start: 2019-04-10 | End: 2019-08-01 | Stop reason: SDUPTHER

## 2019-04-10 RX ORDER — METOPROLOL TARTRATE 50 MG/1
TABLET, FILM COATED ORAL
Qty: 60 TABLET | Refills: 4 | Status: SHIPPED | OUTPATIENT
Start: 2019-04-10 | End: 2019-08-01 | Stop reason: SDUPTHER

## 2019-04-12 RX ORDER — EZETIMIBE 10 MG/1
TABLET ORAL
Qty: 30 TABLET | Refills: 0 | Status: SHIPPED | OUTPATIENT
Start: 2019-04-12 | End: 2019-08-01 | Stop reason: SDUPTHER

## 2019-04-12 RX ORDER — NIFEDIPINE 90 MG/1
TABLET, EXTENDED RELEASE ORAL
Qty: 90 TABLET | Refills: 0 | Status: SHIPPED | OUTPATIENT
Start: 2019-04-12 | End: 2019-07-07 | Stop reason: SDUPTHER

## 2019-04-12 RX ORDER — ZOLPIDEM TARTRATE 10 MG/1
TABLET ORAL
Qty: 30 TABLET | Refills: 0 | Status: SHIPPED | OUTPATIENT
Start: 2019-04-12 | End: 2019-05-11 | Stop reason: SDUPTHER

## 2019-05-13 DIAGNOSIS — Z51.81 MEDICATION MONITORING ENCOUNTER: Primary | ICD-10-CM

## 2019-05-13 RX ORDER — ZOLPIDEM TARTRATE 10 MG/1
TABLET ORAL
Qty: 30 TABLET | Refills: 0 | Status: SHIPPED | OUTPATIENT
Start: 2019-05-13 | End: 2019-06-12 | Stop reason: SDUPTHER

## 2019-05-13 RX ORDER — EZETIMIBE 10 MG/1
TABLET ORAL
Qty: 30 TABLET | Refills: 0 | Status: SHIPPED | OUTPATIENT
Start: 2019-05-13 | End: 2019-08-01 | Stop reason: SDUPTHER

## 2019-05-13 NOTE — TELEPHONE ENCOUNTER
Patient should be due for urine drug screen drug contract and Dillan please double check  Me on that.  I find a urine drug screen from March 2018

## 2019-05-23 ENCOUNTER — LAB (OUTPATIENT)
Dept: FAMILY MEDICINE CLINIC | Facility: CLINIC | Age: 64
End: 2019-05-23

## 2019-05-23 DIAGNOSIS — Z51.81 MEDICATION MONITORING ENCOUNTER: ICD-10-CM

## 2019-05-30 LAB — CONV REPORT SUMMARY: NORMAL

## 2019-06-12 RX ORDER — ZOLPIDEM TARTRATE 10 MG/1
TABLET ORAL
Qty: 30 TABLET | Refills: 0 | Status: SHIPPED | OUTPATIENT
Start: 2019-06-12 | End: 2019-07-11 | Stop reason: SDUPTHER

## 2019-06-27 ENCOUNTER — TELEPHONE (OUTPATIENT)
Dept: FAMILY MEDICINE CLINIC | Facility: CLINIC | Age: 64
End: 2019-06-27

## 2019-07-08 RX ORDER — NIFEDIPINE 90 MG/1
TABLET, EXTENDED RELEASE ORAL
Qty: 90 TABLET | Refills: 0 | Status: SHIPPED | OUTPATIENT
Start: 2019-07-08 | End: 2019-08-01 | Stop reason: SDUPTHER

## 2019-07-11 RX ORDER — ZOLPIDEM TARTRATE 10 MG/1
TABLET ORAL
Qty: 30 TABLET | Refills: 0 | Status: SHIPPED | OUTPATIENT
Start: 2019-07-11 | End: 2019-08-01

## 2019-07-11 RX ORDER — EZETIMIBE 10 MG/1
TABLET ORAL
Qty: 30 TABLET | Refills: 0 | Status: SHIPPED | OUTPATIENT
Start: 2019-07-11 | End: 2019-08-01 | Stop reason: SDUPTHER

## 2019-07-12 RX ORDER — EZETIMIBE 10 MG/1
TABLET ORAL
Qty: 30 TABLET | Refills: 0 | Status: SHIPPED | OUTPATIENT
Start: 2019-07-12 | End: 2019-08-01 | Stop reason: SDUPTHER

## 2019-08-01 ENCOUNTER — TELEPHONE (OUTPATIENT)
Dept: FAMILY MEDICINE CLINIC | Facility: CLINIC | Age: 64
End: 2019-08-01

## 2019-08-01 RX ORDER — VENLAFAXINE HYDROCHLORIDE 75 MG/1
75 CAPSULE, EXTENDED RELEASE ORAL 4 TIMES DAILY
Qty: 360 CAPSULE | Refills: 0 | Status: SHIPPED | OUTPATIENT
Start: 2019-08-01 | End: 2019-08-12 | Stop reason: DRUGHIGH

## 2019-08-01 RX ORDER — FLUTICASONE PROPIONATE 50 MCG
2 SPRAY, SUSPENSION (ML) NASAL DAILY
Qty: 3 BOTTLE | Refills: 0 | Status: SHIPPED | OUTPATIENT
Start: 2019-08-01 | End: 2020-02-24

## 2019-08-01 RX ORDER — EZETIMIBE 10 MG/1
10 TABLET ORAL DAILY
Qty: 90 TABLET | Refills: 0 | Status: SHIPPED | OUTPATIENT
Start: 2019-08-01 | End: 2019-10-12 | Stop reason: SDUPTHER

## 2019-08-01 RX ORDER — ASPIRIN 81 MG/1
81 TABLET, COATED ORAL DAILY
Qty: 90 TABLET | Refills: 0 | Status: SHIPPED | OUTPATIENT
Start: 2019-08-01

## 2019-08-01 RX ORDER — MODAFINIL 200 MG/1
200 TABLET ORAL 2 TIMES DAILY
Qty: 180 TABLET | Refills: 0 | Status: SHIPPED | OUTPATIENT
Start: 2019-08-01

## 2019-08-01 RX ORDER — ALLOPURINOL 300 MG/1
300 TABLET ORAL DAILY
Qty: 90 TABLET | Refills: 0 | Status: SHIPPED | OUTPATIENT
Start: 2019-08-01 | End: 2019-10-12 | Stop reason: SDUPTHER

## 2019-08-01 RX ORDER — CITALOPRAM 40 MG/1
40 TABLET ORAL DAILY
Qty: 90 TABLET | Refills: 0 | Status: SHIPPED | OUTPATIENT
Start: 2019-08-01 | End: 2020-03-23

## 2019-08-01 RX ORDER — CLOTRIMAZOLE AND BETAMETHASONE DIPROPIONATE 10; .64 MG/G; MG/G
CREAM TOPICAL EVERY 12 HOURS SCHEDULED
Qty: 60 G | Refills: 1 | Status: SHIPPED | OUTPATIENT
Start: 2019-08-01 | End: 2020-06-29

## 2019-08-01 RX ORDER — METOPROLOL TARTRATE 50 MG/1
50 TABLET, FILM COATED ORAL 2 TIMES DAILY
Qty: 180 TABLET | Refills: 0 | Status: SHIPPED | OUTPATIENT
Start: 2019-08-01 | End: 2019-10-12 | Stop reason: SDUPTHER

## 2019-08-01 RX ORDER — OMEPRAZOLE 40 MG/1
40 CAPSULE, DELAYED RELEASE ORAL DAILY
Qty: 90 CAPSULE | Refills: 0 | Status: SHIPPED | OUTPATIENT
Start: 2019-08-01

## 2019-08-01 RX ORDER — FUROSEMIDE 20 MG/1
60 TABLET ORAL DAILY
Qty: 270 TABLET | Refills: 0 | Status: SHIPPED | OUTPATIENT
Start: 2019-08-01 | End: 2020-11-23

## 2019-08-01 RX ORDER — TICAGRELOR 90 MG/1
90 TABLET ORAL EVERY 12 HOURS
Qty: 180 TABLET | Refills: 0 | Status: SHIPPED | OUTPATIENT
Start: 2019-08-01 | End: 2019-10-12 | Stop reason: SDUPTHER

## 2019-08-01 RX ORDER — NIFEDIPINE 90 MG/1
90 TABLET, EXTENDED RELEASE ORAL DAILY
Qty: 90 TABLET | Refills: 0 | Status: SHIPPED | OUTPATIENT
Start: 2019-08-01 | End: 2019-10-12 | Stop reason: SDUPTHER

## 2019-08-01 RX ORDER — ZOLPIDEM TARTRATE 10 MG/1
10 TABLET ORAL NIGHTLY PRN
Qty: 90 TABLET | Refills: 0 | Status: SHIPPED | OUTPATIENT
Start: 2019-08-01 | End: 2019-10-15

## 2019-08-01 NOTE — TELEPHONE ENCOUNTER
REFILL ON metoprolol tartrate (LOPRESSOR) 50 MG tablet  BRILINTA 90 MG tablet tablet  zolpidem (AMBIEN) 10 MG tablet  allopurinol (ZYLOPRIM) 300 MG tablet  citalopram (CeleXA) 40 MG tablet  ASPIRIN ADULT LOW DOSE 81 MG EC tablet  venlafaxine XR (EFFEXOR-XR) 75 MG 24 hr capsule  NIFEdipine XL (PROCARDIA XL) 90 MG 24 hr tablet  furosemide (LASIX) 20 MG tablet  fluticasone (FLONASE) 50 MCG/ACT nasal spray  ezetimibe (ZETIA) 10 MG tablet  omeprazole (priLOSEC) 40 MG capsule  modafinil (PROVIGIL) 200 MG tablet  clotrimazole-betamethasone (LOTRISONE) 1-0.05 % cream        PATIENT NEEDS THESE FOR 3 MONTH SUPPLY

## 2019-08-12 ENCOUNTER — TELEPHONE (OUTPATIENT)
Dept: FAMILY MEDICINE CLINIC | Facility: CLINIC | Age: 64
End: 2019-08-12

## 2019-08-12 RX ORDER — VENLAFAXINE HYDROCHLORIDE 225 MG/1
225 TABLET, EXTENDED RELEASE ORAL DAILY
Qty: 90 EACH | Refills: 3 | Status: SHIPPED | OUTPATIENT
Start: 2019-08-12 | End: 2020-03-23

## 2019-08-12 NOTE — TELEPHONE ENCOUNTER
Suggest trying venlafaxine XR  225 milligrams which equivalent of 3 tabs a day of his 75 mg strength.  Is that would be the usual upward dose given.  As the most  viable approach to get this covered.

## 2019-08-13 RX ORDER — ASPIRIN 81 MG/1
TABLET, COATED ORAL
Qty: 30 TABLET | Refills: 3 | Status: SHIPPED | OUTPATIENT
Start: 2019-08-13 | End: 2019-09-20 | Stop reason: SDUPTHER

## 2019-08-22 ENCOUNTER — TELEPHONE (OUTPATIENT)
Dept: FAMILY MEDICINE CLINIC | Facility: CLINIC | Age: 64
End: 2019-08-22

## 2019-08-23 ENCOUNTER — HOSPITAL ENCOUNTER (EMERGENCY)
Facility: HOSPITAL | Age: 64
End: 2019-08-23

## 2019-08-23 ENCOUNTER — OFFICE VISIT (OUTPATIENT)
Dept: FAMILY MEDICINE CLINIC | Facility: CLINIC | Age: 64
End: 2019-08-23

## 2019-08-23 ENCOUNTER — HOSPITAL ENCOUNTER (OUTPATIENT)
Dept: CT IMAGING | Facility: HOSPITAL | Age: 64
Discharge: HOME | End: 2019-08-23
Admitting: INTERNAL MEDICINE

## 2019-08-23 VITALS
HEIGHT: 72 IN | HEART RATE: 95 BPM | WEIGHT: 315 LBS | BODY MASS INDEX: 42.66 KG/M2 | DIASTOLIC BLOOD PRESSURE: 62 MMHG | OXYGEN SATURATION: 95 % | SYSTOLIC BLOOD PRESSURE: 130 MMHG | TEMPERATURE: 99.5 F

## 2019-08-23 VITALS — HEART RATE: 116 BPM | OXYGEN SATURATION: 99 %

## 2019-08-23 DIAGNOSIS — R35.0 URINE FREQUENCY: ICD-10-CM

## 2019-08-23 DIAGNOSIS — Z87.442 PERSONAL HISTORY OF KIDNEY STONES: ICD-10-CM

## 2019-08-23 DIAGNOSIS — R05.9 COUGH: ICD-10-CM

## 2019-08-23 DIAGNOSIS — R10.9 ACUTE LEFT FLANK PAIN: ICD-10-CM

## 2019-08-23 DIAGNOSIS — N12 PYELONEPHRITIS: Primary | ICD-10-CM

## 2019-08-23 LAB
ANION GAP SERPL CALCULATED.3IONS-SCNC: 11.5 MMOL/L (ref 5–15)
BACTERIA UR QL AUTO: ABNORMAL /HPF
BILIRUB UR QL STRIP: ABNORMAL
BUN BLD-MCNC: 12 MG/DL (ref 8–23)
BUN/CREAT SERPL: 9.8 (ref 7–25)
CALCIUM SPEC-SCNC: 9.1 MG/DL (ref 8.6–10.5)
CHLORIDE SERPL-SCNC: 97 MMOL/L (ref 98–107)
CLARITY UR: CLEAR
CO2 SERPL-SCNC: 29.5 MMOL/L (ref 22–29)
COLOR UR: YELLOW
CREAT BLD-MCNC: 1.22 MG/DL (ref 0.76–1.27)
ERYTHROCYTE [DISTWIDTH] IN BLOOD BY AUTOMATED COUNT: 14.3 % (ref 12.3–15.4)
GFR SERPL CREATININE-BSD FRML MDRD: 72 ML/MIN/1.73
GLUCOSE BLD-MCNC: 116 MG/DL (ref 65–99)
GLUCOSE UR STRIP-MCNC: NEGATIVE MG/DL
HCT VFR BLD AUTO: 41.7 % (ref 37.5–51)
HGB BLD-MCNC: 13.6 G/DL (ref 13–17.7)
HGB UR QL STRIP.AUTO: ABNORMAL
KETONES UR QL STRIP: NEGATIVE
LEUKOCYTE ESTERASE UR QL STRIP.AUTO: ABNORMAL
LYMPHOCYTES # BLD AUTO: 1.3 10*3/MM3 (ref 0.7–3.1)
LYMPHOCYTES NFR BLD AUTO: 8.9 % (ref 19.6–45.3)
MCH RBC QN AUTO: 29.5 PG (ref 26.6–33)
MCHC RBC AUTO-ENTMCNC: 32.7 G/DL (ref 31.5–35.7)
MCV RBC AUTO: 90.1 FL (ref 79–97)
MONOCYTES # BLD AUTO: 0.6 10*3/MM3 (ref 0.1–0.9)
MONOCYTES NFR BLD AUTO: 4.2 % (ref 5–12)
NEUTROPHILS # BLD AUTO: 12.3 10*3/MM3 (ref 1.7–7)
NEUTROPHILS NFR BLD AUTO: 86.9 % (ref 42.7–76)
NITRITE UR QL STRIP: POSITIVE
PH UR STRIP.AUTO: 6 [PH] (ref 4.6–8)
PLATELET # BLD AUTO: 206 10*3/MM3 (ref 140–450)
PMV BLD AUTO: 7.4 FL (ref 6–12)
POTASSIUM BLD-SCNC: 3.1 MMOL/L (ref 3.5–5.2)
PROT UR QL STRIP: ABNORMAL
RBC # BLD AUTO: 4.62 10*6/MM3 (ref 4.14–5.8)
RBC # UR: ABNORMAL /HPF
REF LAB TEST METHOD: ABNORMAL
SODIUM BLD-SCNC: 138 MMOL/L (ref 136–145)
SP GR UR STRIP: 1.02 (ref 1–1.03)
SQUAMOUS #/AREA URNS HPF: ABNORMAL /HPF
UROBILINOGEN UR QL STRIP: ABNORMAL
WBC NRBC COR # BLD: 14.1 10*3/MM3 (ref 3.4–10.8)
WBC UR QL AUTO: ABNORMAL /HPF

## 2019-08-23 PROCEDURE — 80048 BASIC METABOLIC PNL TOTAL CA: CPT | Performed by: INTERNAL MEDICINE

## 2019-08-23 PROCEDURE — 74176 CT ABD & PELVIS W/O CONTRAST: CPT

## 2019-08-23 PROCEDURE — 87086 URINE CULTURE/COLONY COUNT: CPT | Performed by: INTERNAL MEDICINE

## 2019-08-23 PROCEDURE — 96372 THER/PROPH/DIAG INJ SC/IM: CPT | Performed by: INTERNAL MEDICINE

## 2019-08-23 PROCEDURE — 87186 SC STD MICRODIL/AGAR DIL: CPT | Performed by: INTERNAL MEDICINE

## 2019-08-23 PROCEDURE — 99214 OFFICE O/P EST MOD 30 MIN: CPT | Performed by: INTERNAL MEDICINE

## 2019-08-23 PROCEDURE — 36415 COLL VENOUS BLD VENIPUNCTURE: CPT | Performed by: INTERNAL MEDICINE

## 2019-08-23 PROCEDURE — 85025 COMPLETE CBC W/AUTO DIFF WBC: CPT | Performed by: INTERNAL MEDICINE

## 2019-08-23 PROCEDURE — 71046 X-RAY EXAM CHEST 2 VIEWS: CPT | Performed by: INTERNAL MEDICINE

## 2019-08-23 PROCEDURE — 87077 CULTURE AEROBIC IDENTIFY: CPT | Performed by: INTERNAL MEDICINE

## 2019-08-23 PROCEDURE — 81001 URINALYSIS AUTO W/SCOPE: CPT | Performed by: INTERNAL MEDICINE

## 2019-08-23 RX ORDER — AMOXICILLIN AND CLAVULANATE POTASSIUM 875; 125 MG/1; MG/1
1 TABLET, FILM COATED ORAL 2 TIMES DAILY
Qty: 28 TABLET | Refills: 0 | Status: SHIPPED | OUTPATIENT
Start: 2019-08-23 | End: 2021-01-01

## 2019-08-23 RX ORDER — CEFTRIAXONE 1 G/1
1 INJECTION, POWDER, FOR SOLUTION INTRAMUSCULAR; INTRAVENOUS ONCE
Status: COMPLETED | OUTPATIENT
Start: 2019-08-23 | End: 2019-08-23

## 2019-08-23 RX ADMIN — CEFTRIAXONE 1 G: 1 INJECTION, POWDER, FOR SOLUTION INTRAMUSCULAR; INTRAVENOUS at 12:18

## 2019-08-23 NOTE — PROGRESS NOTES
Subjective   Maik Lara is a 64 y.o. male.   Recent dysuria quickly followed by left flank discomfort Sunday night felt hot felt nauseated subsequent to this is a 9.5 temperature day and cloudy urine  History of Present Illness   Recent dysuria began Sunday night more pressure with urge to go.  Some incontinence of the standing but not sitting with urination left flank pain personal history of kidney stone and did have flank pain with this as well is having chills also not taken temperature chilling feels sick some sputum production as well.  No sore throat sinus congestion ear pain or diarrhea treated are tension related other potential sources does have mild cough we will get a chest x-ray as well.  Do need a urine on patient  Drug allergies include amlodipine benazepril, benazepril by itself, Cipro causing swelling, sulfa also causing swelling of lips in hospital..  Family history of heart disease.  Patient is non-smoker.  Review of Systems   Constitutional: Positive for chills and fever.   HENT: Positive for sore throat.    Respiratory: Positive for cough and wheezing.    Neurological: Positive for headaches.   All other systems reviewed and are negative.      Objective   Vitals:    08/23/19 1100   BP: 130/62   Pulse: 95   Temp: 99.5 °F (37.5 °C)   SpO2: 95%   Weight: (!) 151 kg (333 lb 3.2 oz)     Physical Exam   Constitutional: He appears well-developed and well-nourished.   HENT:   Head: Normocephalic and atraumatic.   Right Ear: External ear normal.   Left Ear: External ear normal.   Mouth/Throat: Oropharynx is clear and moist.   Eyes: Conjunctivae are normal. Pupils are equal, round, and reactive to light.   Cardiovascular: Normal rate, regular rhythm and normal heart sounds.   Pulmonary/Chest: Effort normal and breath sounds normal.   Abdominal: Soft. Bowel sounds are normal.   Negative CVA tenderness   Neurological: He is alert.   Unremarkable gait and station   Skin: Skin is warm and dry.   Nursing note  and vitals reviewed.      Lab Results   Component Value Date    INR 1.1 08/21/2018       Procedures  Chest x-ray PA and lateral obtained.  Does have mild mild circular area one rib seen on the right perhaps the seventh rib.  No personal history for rib fractures.  No comparison x-ray available.  No active parenchymal infiltrate seen.  No other bony or soft tissue normality is noted.  Assessment/Plan     1.  Pyelonephritis plan Augmentin 875 mg daily for 14 days time  Follow-up in 14 days time and problems for him urine culture being run also..  Patient was advised if condition worsens go to the ER if pending CT should show an obstructive stone likely admission that point in time  2.  Left flank pain    3.  Personal history of kidney stones plan CT the abdomen and pelvis kidney stone protocol will be for obstruction versus stone versus other.    4.  Cough chest x-ray without any particular findings observation for now    Much of this encounter note is an electronic transcription/translation of spoken language to printed text.  The electronic translation of spoken language may permit erroneous, or at times, nonsensical words or phrases to be inadvertently transcribed.  Although I have reviewed the note for such errors, some may still exist. If there are questions or for further clarification, please contact me.

## 2019-08-25 LAB — BACTERIA SPEC AEROBE CULT: ABNORMAL

## 2019-09-20 ENCOUNTER — OFFICE VISIT (OUTPATIENT)
Dept: FAMILY MEDICINE CLINIC | Facility: CLINIC | Age: 64
End: 2019-09-20

## 2019-09-20 VITALS
DIASTOLIC BLOOD PRESSURE: 80 MMHG | BODY MASS INDEX: 42.66 KG/M2 | HEIGHT: 72 IN | OXYGEN SATURATION: 95 % | WEIGHT: 315 LBS | HEART RATE: 83 BPM | TEMPERATURE: 98.4 F | SYSTOLIC BLOOD PRESSURE: 132 MMHG

## 2019-09-20 DIAGNOSIS — M79.662 PAIN OF KNEE AND LOWER LEG, LEFT: ICD-10-CM

## 2019-09-20 DIAGNOSIS — M25.562 ACUTE PAIN OF LEFT KNEE: ICD-10-CM

## 2019-09-20 DIAGNOSIS — M25.512 ACUTE PAIN OF LEFT SHOULDER: Primary | ICD-10-CM

## 2019-09-20 DIAGNOSIS — M25.561 ANTERIOR KNEE PAIN, RIGHT: ICD-10-CM

## 2019-09-20 DIAGNOSIS — M25.551 ACUTE HIP PAIN, RIGHT: ICD-10-CM

## 2019-09-20 DIAGNOSIS — M25.562 PAIN OF KNEE AND LOWER LEG, LEFT: ICD-10-CM

## 2019-09-20 DIAGNOSIS — M25.552 ACUTE HIP PAIN, LEFT: ICD-10-CM

## 2019-09-20 PROCEDURE — 73502 X-RAY EXAM HIP UNI 2-3 VIEWS: CPT | Performed by: INTERNAL MEDICINE

## 2019-09-20 PROCEDURE — 99214 OFFICE O/P EST MOD 30 MIN: CPT | Performed by: INTERNAL MEDICINE

## 2019-09-20 PROCEDURE — 73560 X-RAY EXAM OF KNEE 1 OR 2: CPT | Performed by: INTERNAL MEDICINE

## 2019-09-20 PROCEDURE — 73030 X-RAY EXAM OF SHOULDER: CPT | Performed by: INTERNAL MEDICINE

## 2019-09-20 PROCEDURE — 73590 X-RAY EXAM OF LOWER LEG: CPT | Performed by: INTERNAL MEDICINE
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
## 2019-10-14 RX ORDER — ALLOPURINOL 300 MG/1
TABLET ORAL
Qty: 90 TABLET | Refills: 4 | Status: SHIPPED | OUTPATIENT
Start: 2019-10-14 | End: 2020-11-23

## 2019-10-14 RX ORDER — METOPROLOL TARTRATE 50 MG/1
TABLET, FILM COATED ORAL
Qty: 180 TABLET | Refills: 4 | Status: SHIPPED | OUTPATIENT
Start: 2019-10-14 | End: 2020-11-23

## 2019-10-14 RX ORDER — TICAGRELOR 90 MG/1
TABLET ORAL
Qty: 180 TABLET | Refills: 4 | Status: SHIPPED | OUTPATIENT
Start: 2019-10-14 | End: 2020-10-26

## 2019-10-14 RX ORDER — NIFEDIPINE 90 MG/1
TABLET, EXTENDED RELEASE ORAL
Qty: 90 TABLET | Refills: 4 | Status: SHIPPED | OUTPATIENT
Start: 2019-10-14 | End: 2020-10-26

## 2019-10-14 RX ORDER — EZETIMIBE 10 MG/1
TABLET ORAL
Qty: 90 TABLET | Refills: 4 | Status: SHIPPED | OUTPATIENT
Start: 2019-10-14 | End: 2020-10-26

## 2019-10-15 ENCOUNTER — TELEPHONE (OUTPATIENT)
Dept: FAMILY MEDICINE CLINIC | Facility: CLINIC | Age: 64
End: 2019-10-15

## 2019-10-15 RX ORDER — ZOLPIDEM TARTRATE 10 MG/1
10 TABLET ORAL NIGHTLY PRN
Qty: 90 TABLET | Refills: 0 | Status: SHIPPED | OUTPATIENT
Start: 2019-10-15 | End: 2019-10-15

## 2019-10-15 RX ORDER — ZOLPIDEM TARTRATE 10 MG/1
10 TABLET ORAL NIGHTLY PRN
Qty: 90 TABLET | Refills: 0 | Status: SHIPPED | OUTPATIENT
Start: 2019-10-15 | End: 2019-11-22

## 2019-10-15 NOTE — TELEPHONE ENCOUNTER
Please note I originally sent to the [facility].  Please contact them and cancel this as subsequently realized this, did send the Ambien to [facility].

## 2019-10-21 ENCOUNTER — TELEPHONE (OUTPATIENT)
Dept: FAMILY MEDICINE CLINIC | Facility: CLINIC | Age: 64
End: 2019-10-21

## 2019-10-21 NOTE — TELEPHONE ENCOUNTER
GEOVANNY FROM [facility] CALLING, PT REFERENCE NUMBER IS [id number]    INSURANCE WANTS PT TO TRY ALTERNATIVE MEDS BEFORE ZOLPIDEM;  TRAZODONE  RAMELTEON  SILENOR

## 2019-10-21 NOTE — TELEPHONE ENCOUNTER
If this is a prior authorization issue let me know otherwise we will simply go with the originally prescribed Ambien

## 2019-10-25 ENCOUNTER — CLINICAL SUPPORT (OUTPATIENT)
Dept: FAMILY MEDICINE CLINIC | Facility: CLINIC | Age: 64
End: 2019-10-25

## 2019-10-25 PROCEDURE — 90653 IIV ADJUVANT VACCINE IM: CPT | Performed by: INTERNAL MEDICINE

## 2019-10-25 PROCEDURE — G0008 ADMIN INFLUENZA VIRUS VAC: HCPCS | Performed by: INTERNAL MEDICINE

## 2019-11-11 ENCOUNTER — TELEPHONE (OUTPATIENT)
Dept: FAMILY MEDICINE CLINIC | Facility: CLINIC | Age: 64
End: 2019-11-11

## 2019-11-13 PROBLEM — M51.36 DDD (DEGENERATIVE DISC DISEASE), LUMBAR: Status: ACTIVE | Noted: 2019-11-13

## 2019-11-13 PROBLEM — M54.50 ACUTE LOW BACK PAIN: Status: ACTIVE | Noted: 2019-11-13

## 2019-11-13 PROBLEM — Z12.11 COLON CANCER SCREENING: Status: ACTIVE | Noted: 2017-11-08

## 2019-11-13 PROBLEM — E78.5 DYSLIPIDEMIA: Status: ACTIVE | Noted: 2018-08-22

## 2019-11-13 PROBLEM — G56.22 CUBITAL TUNNEL SYNDROME ON LEFT: Status: ACTIVE | Noted: 2018-10-10

## 2019-11-13 PROBLEM — M62.81 DECREASED MOTOR STRENGTH: Status: ACTIVE | Noted: 2019-11-13

## 2019-11-13 PROBLEM — M50.20 HNP (HERNIATED NUCLEUS PULPOSUS), CERVICAL: Status: ACTIVE | Noted: 2018-10-10

## 2019-11-13 PROBLEM — R26.9 ABNORMAL GAIT: Status: ACTIVE | Noted: 2019-11-13

## 2019-11-13 PROBLEM — M79.606 LEG PAIN: Status: ACTIVE | Noted: 2019-11-13

## 2019-11-13 PROBLEM — M54.6 ACUTE THORACIC BACK PAIN: Status: ACTIVE | Noted: 2019-11-13

## 2019-11-13 PROBLEM — M48.02 CERVICAL STENOSIS OF SPINAL CANAL: Status: ACTIVE | Noted: 2018-11-01

## 2019-11-13 PROBLEM — G47.30 SLEEP APNEA: Status: ACTIVE | Noted: 2019-11-13

## 2019-11-13 PROBLEM — M54.12 BRACHIAL NEURITIS OR RADICULITIS: Status: ACTIVE | Noted: 2018-12-20

## 2019-11-13 PROBLEM — R07.9 CHEST PAIN: Status: ACTIVE | Noted: 2018-08-21

## 2019-11-13 PROBLEM — I10 BENIGN ESSENTIAL HTN: Status: ACTIVE | Noted: 2019-11-13

## 2019-11-13 PROBLEM — I82.811: Status: ACTIVE | Noted: 2018-08-22

## 2019-11-13 PROBLEM — M48.8X2 OSSIFICATION OF POSTERIOR LONGITUDINAL LIGAMENT IN CERVICAL REGION (HCC): Status: ACTIVE | Noted: 2018-11-01

## 2019-11-13 PROBLEM — E66.01 MORBID OBESITY WITH BMI OF 40.0-44.9, ADULT (HCC): Status: ACTIVE | Noted: 2018-08-22

## 2019-11-13 PROBLEM — M48.061 LUMBAR CANAL STENOSIS: Status: ACTIVE | Noted: 2019-11-13

## 2019-11-13 PROBLEM — K21.9 GERD (GASTROESOPHAGEAL REFLUX DISEASE): Status: ACTIVE | Noted: 2019-11-13

## 2019-11-13 PROBLEM — F51.01 PRIMARY INSOMNIA: Status: ACTIVE | Noted: 2019-11-13

## 2019-11-13 PROBLEM — F32.A DEPRESSION: Status: ACTIVE | Noted: 2019-11-13

## 2019-11-13 PROBLEM — M54.2 CERVICAL PAIN: Status: ACTIVE | Noted: 2019-11-13

## 2019-11-18 ENCOUNTER — TELEPHONE (OUTPATIENT)
Dept: FAMILY MEDICINE CLINIC | Facility: CLINIC | Age: 64
End: 2019-11-18

## 2019-11-21 ENCOUNTER — TELEPHONE (OUTPATIENT)
Dept: FAMILY MEDICINE CLINIC | Facility: CLINIC | Age: 64
End: 2019-11-21

## 2019-11-21 NOTE — TELEPHONE ENCOUNTER
Patient informed this was sent to  on Nov 13th.  Re faxed again today.[facility]

## 2019-11-22 ENCOUNTER — TELEPHONE (OUTPATIENT)
Dept: FAMILY MEDICINE CLINIC | Facility: CLINIC | Age: 64
End: 2019-11-22

## 2019-11-22 RX ORDER — ZOLPIDEM TARTRATE 10 MG/1
10 TABLET ORAL NIGHTLY PRN
Qty: 30 TABLET | Refills: 0 | Status: SHIPPED | OUTPATIENT
Start: 2019-11-22 | End: 2019-12-26

## 2019-11-22 NOTE — TELEPHONE ENCOUNTER
Give me Theo report first.  Of note the system looks like he got a quantity of 90 sent on October 15 as a mail order.

## 2019-12-26 RX ORDER — ZOLPIDEM TARTRATE 10 MG/1
TABLET ORAL
Qty: 30 TABLET | Refills: 0 | Status: SHIPPED | OUTPATIENT
Start: 2019-12-26 | End: 2020-01-28

## 2020-01-28 RX ORDER — ZOLPIDEM TARTRATE 10 MG/1
TABLET ORAL
Qty: 30 TABLET | Refills: 0 | Status: SHIPPED | OUTPATIENT
Start: 2020-01-28 | End: 2020-04-27 | Stop reason: SDUPTHER

## 2020-02-24 RX ORDER — FLUTICASONE PROPIONATE 50 MCG
SPRAY, SUSPENSION (ML) NASAL
Qty: 48 G | Refills: 4 | Status: SHIPPED | OUTPATIENT
Start: 2020-02-24 | End: 2020-03-26 | Stop reason: SDUPTHER

## 2020-02-26 ENCOUNTER — APPOINTMENT (OUTPATIENT)
Dept: GENERAL RADIOLOGY | Facility: HOSPITAL | Age: 65
End: 2020-02-26

## 2020-02-26 ENCOUNTER — HOSPITAL ENCOUNTER (OUTPATIENT)
Dept: CARDIOLOGY | Facility: HOSPITAL | Age: 65
Discharge: HOME | End: 2020-02-26

## 2020-02-26 ENCOUNTER — HOSPITAL ENCOUNTER (OUTPATIENT)
Facility: HOSPITAL | Age: 65
Setting detail: OBSERVATION
Discharge: HOME | End: 2020-02-26
Attending: EMERGENCY MEDICINE | Admitting: INTERNAL MEDICINE

## 2020-02-26 VITALS
TEMPERATURE: 97.7 F | WEIGHT: 315 LBS | BODY MASS INDEX: 42.66 KG/M2 | OXYGEN SATURATION: 97 % | HEART RATE: 78 BPM | SYSTOLIC BLOOD PRESSURE: 144 MMHG | DIASTOLIC BLOOD PRESSURE: 95 MMHG | HEIGHT: 72 IN | RESPIRATION RATE: 18 BRPM

## 2020-02-26 DIAGNOSIS — R07.89 CHEST PAIN, MUSCULOSKELETAL: Primary | ICD-10-CM

## 2020-02-26 DIAGNOSIS — R07.9 CHEST PAIN, UNSPECIFIED TYPE: Primary | ICD-10-CM

## 2020-02-26 DIAGNOSIS — Z86.79 HISTORY OF CORONARY ARTERY DISEASE: ICD-10-CM

## 2020-02-26 DIAGNOSIS — Z86.79 HISTORY OF HYPERTENSION: ICD-10-CM

## 2020-02-26 DIAGNOSIS — R07.89 CHEST PAIN, MUSCULOSKELETAL: ICD-10-CM

## 2020-02-26 LAB
ALBUMIN SERPL-MCNC: 3.9 G/DL (ref 3.5–5.2)
ALBUMIN/GLOB SERPL: 1.2 G/DL
ALP SERPL-CCNC: 64 U/L (ref 39–117)
ALT SERPL W P-5'-P-CCNC: 18 U/L (ref 1–41)
ANION GAP SERPL CALCULATED.3IONS-SCNC: 12.4 MMOL/L (ref 5–15)
AST SERPL-CCNC: 22 U/L (ref 1–40)
BASOPHILS # BLD AUTO: 0.03 10*3/MM3 (ref 0–0.2)
BASOPHILS NFR BLD AUTO: 0.5 % (ref 0–1.5)
BH CV NUCLEAR PRIOR STUDY: 2
BH CV STRESS BP STAGE 1: NORMAL
BH CV STRESS COMMENTS STAGE 1: NORMAL
BH CV STRESS DOSE REGADENOSON STAGE 1: 0.4
BH CV STRESS DURATION MIN STAGE 1: 0
BH CV STRESS DURATION SEC STAGE 1: 10
BH CV STRESS HR STAGE 1: 86
BH CV STRESS PROTOCOL 1: NORMAL
BH CV STRESS RECOVERY BP: NORMAL MMHG
BH CV STRESS RECOVERY HR: 82 BPM
BH CV STRESS STAGE 1: 1
BILIRUB SERPL-MCNC: 0.3 MG/DL (ref 0.2–1.2)
BUN BLD-MCNC: 16 MG/DL (ref 8–23)
BUN/CREAT SERPL: 15.5 (ref 7–25)
CALCIUM SPEC-SCNC: 9.2 MG/DL (ref 8.6–10.5)
CHLORIDE SERPL-SCNC: 101 MMOL/L (ref 98–107)
CO2 SERPL-SCNC: 27.6 MMOL/L (ref 22–29)
CREAT BLD-MCNC: 1.03 MG/DL (ref 0.76–1.27)
DEPRECATED RDW RBC AUTO: 46 FL (ref 37–54)
EOSINOPHIL # BLD AUTO: 0.4 10*3/MM3 (ref 0–0.4)
EOSINOPHIL NFR BLD AUTO: 6.7 % (ref 0.3–6.2)
ERYTHROCYTE [DISTWIDTH] IN BLOOD BY AUTOMATED COUNT: 13.7 % (ref 12.3–15.4)
GFR SERPL CREATININE-BSD FRML MDRD: 88 ML/MIN/1.73
GLOBULIN UR ELPH-MCNC: 3.2 GM/DL
GLUCOSE BLD-MCNC: 99 MG/DL (ref 65–99)
HCT VFR BLD AUTO: 40 % (ref 37.5–51)
HGB BLD-MCNC: 13.1 G/DL (ref 13–17.7)
HOLD SPECIMEN: NORMAL
HOLD SPECIMEN: NORMAL
IMM GRANULOCYTES # BLD AUTO: 0.01 10*3/MM3 (ref 0–0.05)
IMM GRANULOCYTES NFR BLD AUTO: 0.2 % (ref 0–0.5)
LV EF NUC BP: 58 %
LYMPHOCYTES # BLD AUTO: 1.7 10*3/MM3 (ref 0.7–3.1)
LYMPHOCYTES NFR BLD AUTO: 28.6 % (ref 19.6–45.3)
MAXIMAL PREDICTED HEART RATE: 155 BPM
MCH RBC QN AUTO: 29.6 PG (ref 26.6–33)
MCHC RBC AUTO-ENTMCNC: 32.8 G/DL (ref 31.5–35.7)
MCV RBC AUTO: 90.3 FL (ref 79–97)
MONOCYTES # BLD AUTO: 0.65 10*3/MM3 (ref 0.1–0.9)
MONOCYTES NFR BLD AUTO: 10.9 % (ref 5–12)
NEUTROPHILS # BLD AUTO: 3.15 10*3/MM3 (ref 1.7–7)
NEUTROPHILS NFR BLD AUTO: 53.1 % (ref 42.7–76)
NRBC BLD AUTO-RTO: 0 /100 WBC (ref 0–0.2)
PERCENT MAX PREDICTED HR: 55.48 %
PLATELET # BLD AUTO: 222 10*3/MM3 (ref 140–450)
PMV BLD AUTO: 9.6 FL (ref 6–12)
POTASSIUM BLD-SCNC: 3.4 MMOL/L (ref 3.5–5.2)
PROT SERPL-MCNC: 7.1 G/DL (ref 6–8.5)
RBC # BLD AUTO: 4.43 10*6/MM3 (ref 4.14–5.8)
SODIUM BLD-SCNC: 141 MMOL/L (ref 136–145)
STRESS BASELINE BP: NORMAL MMHG
STRESS BASELINE HR: 79 BPM
STRESS PERCENT HR: 65 %
STRESS POST EXERCISE DUR SEC: 10 SEC
STRESS POST PEAK BP: NORMAL MMHG
STRESS POST PEAK HR: 86 BPM
STRESS TARGET HR: 132 BPM
TROPONIN T SERPL-MCNC: <0.01 NG/ML (ref 0–0.03)
TROPONIN T SERPL-MCNC: <0.01 NG/ML (ref 0–0.03)
WBC NRBC COR # BLD: 5.94 10*3/MM3 (ref 3.4–10.8)
WHOLE BLOOD HOLD SPECIMEN: NORMAL
WHOLE BLOOD HOLD SPECIMEN: NORMAL

## 2020-02-26 PROCEDURE — 0 RUBIDIUM CHLORIDE: Performed by: INTERNAL MEDICINE

## 2020-02-26 PROCEDURE — 93017 CV STRESS TEST TRACING ONLY: CPT

## 2020-02-26 PROCEDURE — 93005 ELECTROCARDIOGRAM TRACING: CPT

## 2020-02-26 PROCEDURE — G0378 HOSPITAL OBSERVATION PER HR: HCPCS

## 2020-02-26 PROCEDURE — 80053 COMPREHEN METABOLIC PANEL: CPT | Performed by: EMERGENCY MEDICINE

## 2020-02-26 PROCEDURE — 84484 ASSAY OF TROPONIN QUANT: CPT | Performed by: EMERGENCY MEDICINE

## 2020-02-26 PROCEDURE — 93005 ELECTROCARDIOGRAM TRACING: CPT | Performed by: EMERGENCY MEDICINE

## 2020-02-26 PROCEDURE — 78492 MYOCRD IMG PET MLT RST&STRS: CPT

## 2020-02-26 PROCEDURE — 71046 X-RAY EXAM CHEST 2 VIEWS: CPT

## 2020-02-26 PROCEDURE — 96376 TX/PRO/DX INJ SAME DRUG ADON: CPT

## 2020-02-26 PROCEDURE — 99285 EMERGENCY DEPT VISIT HI MDM: CPT

## 2020-02-26 PROCEDURE — 99220 PR INITIAL OBSERVATION CARE/DAY 70 MINUTES: CPT | Performed by: INTERNAL MEDICINE

## 2020-02-26 PROCEDURE — 93016 CV STRESS TEST SUPVJ ONLY: CPT | Performed by: INTERNAL MEDICINE

## 2020-02-26 PROCEDURE — 25010000002 REGADENOSON 0.4 MG/5ML SOLUTION: Performed by: INTERNAL MEDICINE

## 2020-02-26 PROCEDURE — 93018 CV STRESS TEST I&R ONLY: CPT | Performed by: INTERNAL MEDICINE

## 2020-02-26 PROCEDURE — A9555 RB82 RUBIDIUM: HCPCS | Performed by: INTERNAL MEDICINE

## 2020-02-26 PROCEDURE — 93010 ELECTROCARDIOGRAM REPORT: CPT | Performed by: INTERNAL MEDICINE

## 2020-02-26 PROCEDURE — 96374 THER/PROPH/DIAG INJ IV PUSH: CPT

## 2020-02-26 PROCEDURE — 85025 COMPLETE CBC W/AUTO DIFF WBC: CPT

## 2020-02-26 PROCEDURE — 96375 TX/PRO/DX INJ NEW DRUG ADDON: CPT

## 2020-02-26 PROCEDURE — 78492 MYOCRD IMG PET MLT RST&STRS: CPT | Performed by: INTERNAL MEDICINE

## 2020-02-26 RX ORDER — SODIUM CHLORIDE 0.9 % (FLUSH) 0.9 %
10 SYRINGE (ML) INJECTION AS NEEDED
Status: DISCONTINUED | OUTPATIENT
Start: 2020-02-26 | End: 2020-02-26 | Stop reason: HOSPADM

## 2020-02-26 RX ORDER — ASPIRIN 325 MG
325 TABLET ORAL ONCE
Status: COMPLETED | OUTPATIENT
Start: 2020-02-26 | End: 2020-02-26

## 2020-02-26 RX ORDER — SODIUM CHLORIDE 0.9 % (FLUSH) 0.9 %
10 SYRINGE (ML) INJECTION EVERY 12 HOURS SCHEDULED
Status: DISCONTINUED | OUTPATIENT
Start: 2020-02-26 | End: 2020-02-26 | Stop reason: HOSPADM

## 2020-02-26 RX ORDER — ZOLPIDEM TARTRATE 5 MG/1
10 TABLET ORAL NIGHTLY PRN
Status: DISCONTINUED | OUTPATIENT
Start: 2020-02-26 | End: 2020-02-26 | Stop reason: HOSPADM

## 2020-02-26 RX ORDER — ALLOPURINOL 300 MG/1
300 TABLET ORAL DAILY
Status: DISCONTINUED | OUTPATIENT
Start: 2020-02-26 | End: 2020-02-26 | Stop reason: HOSPADM

## 2020-02-26 RX ORDER — CYCLOBENZAPRINE HCL 10 MG
5 TABLET ORAL 3 TIMES DAILY PRN
Status: DISCONTINUED | OUTPATIENT
Start: 2020-02-26 | End: 2020-02-26 | Stop reason: HOSPADM

## 2020-02-26 RX ORDER — HYDROCODONE BITARTRATE AND ACETAMINOPHEN 7.5; 325 MG/1; MG/1
1 TABLET ORAL EVERY 6 HOURS PRN
Status: DISCONTINUED | OUTPATIENT
Start: 2020-02-26 | End: 2020-02-26 | Stop reason: HOSPADM

## 2020-02-26 RX ORDER — ASPIRIN 81 MG/1
81 TABLET ORAL DAILY
Status: DISCONTINUED | OUTPATIENT
Start: 2020-02-26 | End: 2020-02-26 | Stop reason: HOSPADM

## 2020-02-26 RX ORDER — PANTOPRAZOLE SODIUM 40 MG/1
40 TABLET, DELAYED RELEASE ORAL EVERY MORNING
Status: DISCONTINUED | OUTPATIENT
Start: 2020-02-26 | End: 2020-02-26 | Stop reason: HOSPADM

## 2020-02-26 RX ORDER — METOPROLOL TARTRATE 50 MG/1
50 TABLET, FILM COATED ORAL 2 TIMES DAILY
Status: DISCONTINUED | OUTPATIENT
Start: 2020-02-26 | End: 2020-02-26 | Stop reason: HOSPADM

## 2020-02-26 RX ORDER — TRAMADOL HYDROCHLORIDE 50 MG/1
100 TABLET ORAL EVERY 6 HOURS PRN
Status: DISCONTINUED | OUTPATIENT
Start: 2020-02-26 | End: 2020-02-26 | Stop reason: HOSPADM

## 2020-02-26 RX ORDER — NITROGLYCERIN 0.4 MG/1
0.4 TABLET SUBLINGUAL ONCE
Status: COMPLETED | OUTPATIENT
Start: 2020-02-26 | End: 2020-02-26

## 2020-02-26 RX ORDER — CITALOPRAM 40 MG/1
40 TABLET ORAL DAILY
Status: DISCONTINUED | OUTPATIENT
Start: 2020-02-26 | End: 2020-02-26 | Stop reason: HOSPADM

## 2020-02-26 RX ORDER — GABAPENTIN 300 MG/1
300 CAPSULE ORAL 3 TIMES DAILY
COMMUNITY
End: 2021-01-01

## 2020-02-26 RX ORDER — SPIRONOLACTONE 25 MG/1
25 TABLET ORAL DAILY
Status: DISCONTINUED | OUTPATIENT
Start: 2020-02-26 | End: 2020-02-26 | Stop reason: HOSPADM

## 2020-02-26 RX ORDER — FLUTICASONE PROPIONATE 50 MCG
1 SPRAY, SUSPENSION (ML) NASAL DAILY
Status: DISCONTINUED | OUTPATIENT
Start: 2020-02-26 | End: 2020-02-26 | Stop reason: HOSPADM

## 2020-02-26 RX ADMIN — CITALOPRAM 40 MG: 40 TABLET, FILM COATED ORAL at 11:00

## 2020-02-26 RX ADMIN — REGADENOSON 0.4 MG: 0.08 INJECTION, SOLUTION INTRAVENOUS at 10:24

## 2020-02-26 RX ADMIN — ALLOPURINOL 300 MG: 300 TABLET ORAL at 11:01

## 2020-02-26 RX ADMIN — ASPIRIN 81 MG: 81 TABLET, COATED ORAL at 11:01

## 2020-02-26 RX ADMIN — FLUTICASONE PROPIONATE 1 SPRAY: 50 SPRAY, METERED NASAL at 11:04

## 2020-02-26 RX ADMIN — METOPROLOL TARTRATE 50 MG: 50 TABLET, FILM COATED ORAL at 11:11

## 2020-02-26 RX ADMIN — TICAGRELOR 90 MG: 90 TABLET ORAL at 11:07

## 2020-02-26 RX ADMIN — NITROGLYCERIN 0.4 MG: 0.4 TABLET SUBLINGUAL at 01:10

## 2020-02-26 RX ADMIN — NIFEDIPINE 90 MG: 60 TABLET, FILM COATED, EXTENDED RELEASE ORAL at 10:59

## 2020-02-26 RX ADMIN — ASPIRIN 325 MG: 325 TABLET ORAL at 01:10

## 2020-02-26 RX ADMIN — RUBIDIUM CHLORIDE RB-82 1 DOSE: 150 INJECTION, SOLUTION INTRAVENOUS at 10:13

## 2020-02-26 RX ADMIN — RUBIDIUM CHLORIDE RB-82 1 DOSE: 150 INJECTION, SOLUTION INTRAVENOUS at 10:24

## 2020-02-28 ENCOUNTER — TELEPHONE (OUTPATIENT)
Dept: CARDIOLOGY | Facility: CLINIC | Age: 65
End: 2020-02-28

## 2020-02-28 NOTE — TELEPHONE ENCOUNTER
----- Message from Caty Renteria RN sent at 2/26/2020  2:29 PM EST -----  Regarding: follow up  This patient will need a 6 month appointment with Marley. He will follow up with us instead of [facility]    Thanks    Caty

## 2020-03-13 ENCOUNTER — TELEPHONE (OUTPATIENT)
Dept: FAMILY MEDICINE CLINIC | Facility: CLINIC | Age: 65
End: 2020-03-13

## 2020-03-13 NOTE — TELEPHONE ENCOUNTER
I thought I was just reversed.  Her he is on a 225 daily and they want to do it is a 75 3 times daily if his insurance will cover it yes I will.

## 2020-03-13 NOTE — TELEPHONE ENCOUNTER
Adilia from the [facility] was calling to see if Dr Smith would be willing to start sending the patients venlafaxine prescription as the capsule form which he would need 3 a day instead of the 1 daily in tablet form to equal the 225 mg daily dose.       Patient is willing to switch according to Adilia and wants the 90 day supply sent to his mail order pharmacy   [facility] - [address] [phone] [facility] [phone] EMILY Pat can be reached at [phone] with any further questions or concerns

## 2020-03-23 ENCOUNTER — TELEPHONE (OUTPATIENT)
Dept: FAMILY MEDICINE CLINIC | Facility: CLINIC | Age: 65
End: 2020-03-23

## 2020-03-23 RX ORDER — VENLAFAXINE 75 MG/1
75 TABLET ORAL 3 TIMES DAILY
Qty: 270 TABLET | Refills: 1 | Status: SHIPPED | OUTPATIENT
Start: 2020-03-23 | End: 2020-04-06 | Stop reason: SDUPTHER

## 2020-03-23 NOTE — TELEPHONE ENCOUNTER
Adilia from the [facility] was calling to see if Dr Smith would be willing to start sending the patients venlafaxine prescription as the capsule form which he would need 3 a day instead of the 1 daily in tablet form to equal the 225 mg daily dose.         Patient is willing to switch according to Adilia and wants the 90 day supply sent to his mail order pharmacy   [facility] - [address] [phone] [facility] [phone] FX      Adilia can be reached at [phone] with any further questions or concerns\      ADILIA STATES THAT SHE WOULD LOVE FOR SOMEONE TO REACH OUT ASA

## 2020-03-26 RX ORDER — VENLAFAXINE 75 MG/1
75 TABLET ORAL 3 TIMES DAILY
Qty: 270 TABLET | Refills: 1 | Status: CANCELLED | OUTPATIENT
Start: 2020-03-26

## 2020-03-26 RX ORDER — FLUTICASONE PROPIONATE 50 MCG
2 SPRAY, SUSPENSION (ML) NASAL DAILY
Qty: 3 BOTTLE | Refills: 3 | Status: SHIPPED | OUTPATIENT
Start: 2020-03-26 | End: 2021-02-22 | Stop reason: SDUPTHER

## 2020-03-27 ENCOUNTER — TELEPHONE (OUTPATIENT)
Dept: FAMILY MEDICINE CLINIC | Facility: CLINIC | Age: 65
End: 2020-03-27

## 2020-03-27 NOTE — TELEPHONE ENCOUNTER
PHARM CALLED TO REQUEST THE PATIENTS VENLAFAXINE 75 MG MEDICATION BE SENT TO HIS MAIL ORDER PHARM ( [facility]) FOR A 90 DAY SUPPLY. PLEASE ADVISE

## 2020-04-06 ENCOUNTER — TELEPHONE (OUTPATIENT)
Dept: FAMILY MEDICINE CLINIC | Facility: CLINIC | Age: 65
End: 2020-04-06

## 2020-04-06 RX ORDER — VENLAFAXINE 75 MG/1
75 TABLET ORAL 3 TIMES DAILY
Qty: 270 TABLET | Refills: 1 | Status: SHIPPED | OUTPATIENT
Start: 2020-04-06 | End: 2020-04-24 | Stop reason: DRUGHIGH

## 2020-04-06 NOTE — TELEPHONE ENCOUNTER
MARIAMA  ZAINA PHARMA [facility] .CALLED IN TO FOLLOW UP ON A  MED REFILL REQUEST ON  venlafaxine (EFFEXOR) 75 MG tablet A 90 DAY SUPPLY SENT TO [facility] [place] . CALL BACK  [phone].

## 2020-04-09 NOTE — TELEPHONE ENCOUNTER
MARIAMA FROM [employer] [employer] [facility] CALLED AGAIN STATING THAT PATIENT WAS SWITCHED TO venlafaxine (EFFEXOR) 75 MG XR CAPSULES. 90 DAY SUPPLY, 3X DAILY FOR A TOTAL OF 90 CAPSULES, NOT TABLET,  TO BE SENT TO [facility].    CB IF THERE IS ANY QUESTIONS: [phone]

## 2020-04-24 ENCOUNTER — TELEPHONE (OUTPATIENT)
Dept: FAMILY MEDICINE CLINIC | Facility: CLINIC | Age: 65
End: 2020-04-24

## 2020-04-24 RX ORDER — VENLAFAXINE HYDROCHLORIDE 75 MG/1
CAPSULE, EXTENDED RELEASE ORAL
Qty: 90 CAPSULE | Refills: 3 | Status: CANCELLED | OUTPATIENT
Start: 2020-04-24

## 2020-04-24 RX ORDER — VENLAFAXINE HYDROCHLORIDE 75 MG/1
75 CAPSULE, EXTENDED RELEASE ORAL DAILY
Qty: 270 CAPSULE | Refills: 3 | Status: SHIPPED | OUTPATIENT
Start: 2020-04-24 | End: 2020-05-02

## 2020-04-24 NOTE — TELEPHONE ENCOUNTER
MARIAMA A PHARMACIST FOR [facility] CALLING AND STATES THEY STILL NEED THE venlafaxine EXTENDED RELEASE 75MG CAPSULES THAT HE WOULD TAKE THREE DAILY AT A 90 DAY SUPPLY SENT TO [facility].     MARIAMA CAN BE REACHED [phone] AND ASK FOR MARIAMA.

## 2020-04-27 ENCOUNTER — TELEPHONE (OUTPATIENT)
Dept: FAMILY MEDICINE CLINIC | Facility: CLINIC | Age: 65
End: 2020-04-27

## 2020-04-27 RX ORDER — ZOLPIDEM TARTRATE 10 MG/1
10 TABLET ORAL NIGHTLY PRN
Qty: 30 TABLET | Refills: 0 | Status: SHIPPED | OUTPATIENT
Start: 2020-04-27 | End: 2020-05-22 | Stop reason: SDUPTHER

## 2020-04-28 RX ORDER — ZOLPIDEM TARTRATE 10 MG/1
10 TABLET ORAL NIGHTLY PRN
Qty: 30 TABLET | Refills: 0 | OUTPATIENT
Start: 2020-04-28

## 2020-04-29 ENCOUNTER — TELEPHONE (OUTPATIENT)
Dept: FAMILY MEDICINE CLINIC | Facility: CLINIC | Age: 65
End: 2020-04-29

## 2020-04-29 NOTE — TELEPHONE ENCOUNTER
PLEASE CALL MARIAMA BACK IN REGARDS TO THE PATIENTS DIRECTIONS ON HIS ADDERALL MEDICATION -[phone].

## 2020-05-01 ENCOUNTER — TELEPHONE (OUTPATIENT)
Dept: FAMILY MEDICINE CLINIC | Facility: CLINIC | Age: 65
End: 2020-05-01

## 2020-05-01 NOTE — TELEPHONE ENCOUNTER
PATIENT WAS CALLING AFTER RECEIVING HIS MAIL ORDER PRESCRIPTION OF THE venlafaxine XR (EFFEXOR-XR) 75 MG 24 hr capsule     PATIENT STATED WHEN HE ORDERED IT HE REQUESTED 270 TABLETS BECAUSE HE TAKES 3 A DAY   BUT THE INSTRUCTIONS STATE TAKE 1 A DAY SO THERE MACHINE WOULD NOT LET THEM DISPENSE MORE THAN THE INSTRUCTIONS DICTATE.   PATIENT HAS ENOUGH MEDICATION FOR NOW BUT WILL NEED THIS FIXED ASAP     PATIENT CAN BE REACHED [phone]

## 2020-05-02 RX ORDER — VENLAFAXINE HYDROCHLORIDE 75 MG/1
CAPSULE, EXTENDED RELEASE ORAL
Qty: 270 CAPSULE | Refills: 3 | Status: SHIPPED | OUTPATIENT
Start: 2020-05-02 | End: 2021-01-01 | Stop reason: SDUPTHER

## 2020-05-22 RX ORDER — ZOLPIDEM TARTRATE 10 MG/1
10 TABLET ORAL NIGHTLY PRN
Qty: 90 TABLET | Refills: 0 | Status: SHIPPED | OUTPATIENT
Start: 2020-05-22 | End: 2020-08-19 | Stop reason: SDUPTHER

## 2020-05-22 RX ORDER — ZOLPIDEM TARTRATE 10 MG/1
10 TABLET ORAL NIGHTLY PRN
Qty: 7 TABLET | Refills: 0 | Status: SHIPPED | OUTPATIENT
Start: 2020-05-22 | End: 2020-05-22

## 2020-05-28 ENCOUNTER — TELEPHONE (OUTPATIENT)
Dept: FAMILY MEDICINE CLINIC | Facility: CLINIC | Age: 65
End: 2020-05-28

## 2020-06-29 RX ORDER — CLOTRIMAZOLE AND BETAMETHASONE DIPROPIONATE 10; .64 MG/G; MG/G
CREAM TOPICAL
Qty: 60 G | Refills: 11 | Status: SHIPPED | OUTPATIENT
Start: 2020-06-29 | End: 2021-01-01 | Stop reason: SDUPTHER

## 2020-08-18 ENCOUNTER — TELEPHONE (OUTPATIENT)
Dept: FAMILY MEDICINE CLINIC | Facility: CLINIC | Age: 65
End: 2020-08-18

## 2020-08-18 RX ORDER — ZOLPIDEM TARTRATE 10 MG/1
10 TABLET ORAL NIGHTLY PRN
Qty: 90 TABLET | Refills: 0 | Status: CANCELLED | OUTPATIENT
Start: 2020-08-18

## 2020-08-18 NOTE — TELEPHONE ENCOUNTER
Please check with patient confirm that patient himself not Damaris needs refill on the medication as a mail order from [facility]

## 2020-08-19 RX ORDER — ZOLPIDEM TARTRATE 10 MG/1
10 TABLET ORAL NIGHTLY PRN
Qty: 14 TABLET | Refills: 0 | Status: SHIPPED | OUTPATIENT
Start: 2020-08-19 | End: 2020-08-28

## 2020-08-19 RX ORDER — ZOLPIDEM TARTRATE 10 MG/1
10 TABLET ORAL NIGHTLY PRN
Qty: 90 TABLET | Refills: 0 | Status: SHIPPED | OUTPATIENT
Start: 2020-08-19 | End: 2020-08-19

## 2020-08-19 NOTE — TELEPHONE ENCOUNTER
Done.  Inform patient an unknown person reporting to be from the [place] [employer] benefits office does not have any validity asking for refills of the something he needs to initiate himself.

## 2020-08-19 NOTE — TELEPHONE ENCOUNTER
Patient states been trying to get HIS medication since first of August through mail order, now he is almost out he needs help. Only 2 left.  Wants 14 day sent to local [facility] at [facility] and 90 day sent to [facility]. They will mail out to him. Needs Asap. He said

## 2020-08-28 ENCOUNTER — TELEPHONE (OUTPATIENT)
Dept: FAMILY MEDICINE CLINIC | Facility: CLINIC | Age: 65
End: 2020-08-28

## 2020-08-28 RX ORDER — ZOLPIDEM TARTRATE 10 MG/1
10 TABLET ORAL NIGHTLY PRN
Qty: 90 TABLET | Refills: 0 | Status: SHIPPED | OUTPATIENT
Start: 2020-08-28 | End: 2020-09-04

## 2020-08-28 NOTE — TELEPHONE ENCOUNTER
PT CALLED STATING HIS RX FOR   zolpidem (AMBIEN) 10 MG tablet  WAS STILL NOT RECEIVED BY [facility].    THAT NEEDS TO BE SENT OVER TODAY IF AT ALL POSSIBLE.    PT HAS 7 TABLETS LEFT FROM THE SUPPLY WE SENT TO THE LOCAL PHARMACY 9 DAYS AGO. IF WE CANNOT GET THE MAIL ORDER TO HIM WITHIN A WEEK, HE WILL NEED MORE SENT TO THE LOCAL PHARMACY.    PLEASE CALL BACK TO CONFIRM RECEIPT BY [facility] [phone]      [facility] - [address] - [phone] [facility] [phone] [phone]    [address] BYPASS AT [facility] [address] & (FRANCIS SANCHEZ) - [phone] [facility] [phone]   [phone]

## 2020-08-31 ENCOUNTER — DOCUMENTATION (OUTPATIENT)
Dept: FAMILY MEDICINE CLINIC | Facility: CLINIC | Age: 65
End: 2020-08-31

## 2020-09-04 ENCOUNTER — TELEPHONE (OUTPATIENT)
Dept: FAMILY MEDICINE CLINIC | Facility: CLINIC | Age: 65
End: 2020-09-04

## 2020-09-04 RX ORDER — ZOLPIDEM TARTRATE 10 MG/1
10 TABLET ORAL NIGHTLY PRN
Qty: 7 TABLET | Refills: 0 | Status: SHIPPED | OUTPATIENT
Start: 2020-09-04 | End: 2020-11-12

## 2020-09-04 NOTE — TELEPHONE ENCOUNTER
PT NEEDS 5-6 OF ZOLPIDEM SENT TO HIS LOCAL PHARMACY, NOT IN CHART YET.    [facility]  [address] [phone]

## 2020-09-04 NOTE — TELEPHONE ENCOUNTER
RX WAS ALREADY SENT TO [facility]  PT CAN HAVE THEM TRANSFER IF HE DOESN'T WANT TO PICK IT UP THERE  PT INFORMED

## 2020-10-06 ENCOUNTER — TELEPHONE (OUTPATIENT)
Dept: FAMILY MEDICINE CLINIC | Facility: CLINIC | Age: 65
End: 2020-10-06

## 2020-10-06 NOTE — TELEPHONE ENCOUNTER
See if patient is running temperature not not feeling particular short of breath also see if he is got a current inhaler at home we will address that

## 2020-10-06 NOTE — TELEPHONE ENCOUNTER
Caller: Maik Lara    Relationship: Self    Best call back number: [phone]     What medication are you requesting: ANTIBIOTIC FOR CONGESTION    What are your current symptoms: CONGESTION, WHEEZING    How long have you been experiencing symptoms: ABOUT 3 DAYS    Have you had these symptoms before:    [x] Yes  [] No   PATIENT STATES HE EXPERIENCES THIS YEARLY    Have you been treated for these symptoms before:   [x] Yes  [] No    If a prescription is needed, what is your preferred pharmacy and phone number: [facility] [address] BYPASS AT [facility] & ([facility]) - [phone] [facility] [phone] FX     Additional notes: Patient stated that he was experiencing congestion with yellow mucus and some wheezing and wanted to know if Dr. Smith would be able to call in an antibiotic.

## 2020-10-06 NOTE — TELEPHONE ENCOUNTER
Patient says little short of breath has not had any fever and has a pro air inhaler at [facility] but has not picked it up yet .

## 2020-10-07 RX ORDER — AZITHROMYCIN 250 MG/1
TABLET, FILM COATED ORAL
Qty: 6 TABLET | Refills: 0 | Status: SHIPPED | OUTPATIENT
Start: 2020-10-07 | End: 2020-10-08 | Stop reason: SDUPTHER

## 2020-10-07 RX ORDER — CLINDAMYCIN HYDROCHLORIDE 300 MG/1
300 CAPSULE ORAL 3 TIMES DAILY
Qty: 30 CAPSULE | Refills: 0 | Status: SHIPPED | OUTPATIENT
Start: 2020-10-07 | End: 2020-10-17

## 2020-10-07 NOTE — TELEPHONE ENCOUNTER
Sent in clindamycin 300 3 times daily for 10 days time patient is not improved 70 desires was just to go to [facility] for other urgent care for the Glucola testing as well as possible chest x-ray

## 2020-10-08 ENCOUNTER — TELEPHONE (OUTPATIENT)
Dept: FAMILY MEDICINE CLINIC | Facility: CLINIC | Age: 65
End: 2020-10-08

## 2020-10-08 RX ORDER — AZITHROMYCIN 250 MG/1
TABLET, FILM COATED ORAL
Qty: 6 TABLET | Refills: 0 | Status: SHIPPED | OUTPATIENT
Start: 2020-10-08 | End: 2020-10-26 | Stop reason: SDUPTHER

## 2020-10-08 NOTE — TELEPHONE ENCOUNTER
PATIENT STATES HE NEEDS TO HAVE THE AZITHROMYCIN THAT WAS SENT IN TO GO TO A LOCAL PHARMACY NOT HIS [facility] AS HE WILL NEED THIS MEDICATION NOW AND NOT WHEN [facility] MAIL ORDER WOULD COME.    CAN YOU PLEASE RESEND IT TO THE FOLLOWING PHARMACY AND LET HIM KNOW WHEN THAT IS PUT THROUGH.      HIS CALLBACK NUMBER IS:  [phone]       CONFIRMED PHARMACY:    [facility] [address] [place] [address] AT [facility] & (FRANCIS SANCHEZ) - [phone] [facility] [phone] FX

## 2020-10-23 ENCOUNTER — TELEPHONE (OUTPATIENT)
Dept: FAMILY MEDICINE CLINIC | Facility: CLINIC | Age: 65
End: 2020-10-23

## 2020-10-23 RX ORDER — AZITHROMYCIN 250 MG/1
TABLET, FILM COATED ORAL
Qty: 6 TABLET | Refills: 0 | Status: CANCELLED | OUTPATIENT
Start: 2020-10-23

## 2020-10-23 RX ORDER — AZITHROMYCIN 250 MG/1
TABLET, FILM COATED ORAL
Qty: 6 TABLET | Refills: 0 | OUTPATIENT
Start: 2020-10-23

## 2020-10-23 NOTE — TELEPHONE ENCOUNTER
PATIENT IS NEEDING A REFILL ON    azithromycin (Zithromax) 250 MG tablet    PATIENT IS OUT OF THIS MEDICATION    PATIENT IS STILL HAVING SINUS DRAINAGE AND A LITTLE BIT OF WHEEZING      PHARMACY:  [facility] [address] [place] [address] [facility] & (FR[facility]IS SANCHEZ) - [phone] [facility] [phone]         PT CONTACT@[phone]

## 2020-10-26 ENCOUNTER — OFFICE VISIT (OUTPATIENT)
Dept: FAMILY MEDICINE CLINIC | Facility: CLINIC | Age: 65
End: 2020-10-26

## 2020-10-26 DIAGNOSIS — Z51.81 MEDICATION MONITORING ENCOUNTER: ICD-10-CM

## 2020-10-26 DIAGNOSIS — J20.9 BRONCHITIS WITH BRONCHOSPASM: ICD-10-CM

## 2020-10-26 DIAGNOSIS — I10 HYPERTENSION, ESSENTIAL: ICD-10-CM

## 2020-10-26 DIAGNOSIS — E78.5 HYPERLIPIDEMIA, UNSPECIFIED HYPERLIPIDEMIA TYPE: ICD-10-CM

## 2020-10-26 DIAGNOSIS — J01.90 ACUTE SINUSITIS, RECURRENCE NOT SPECIFIED, UNSPECIFIED LOCATION: Primary | ICD-10-CM

## 2020-10-26 DIAGNOSIS — G47.30 SLEEP APNEA, UNSPECIFIED TYPE: ICD-10-CM

## 2020-10-26 DIAGNOSIS — F51.01 PRIMARY INSOMNIA: ICD-10-CM

## 2020-10-26 PROCEDURE — 99443 PR PHYS/QHP TELEPHONE EVALUATION 21-30 MIN: CPT | Performed by: INTERNAL MEDICINE

## 2020-10-26 RX ORDER — AZITHROMYCIN 250 MG/1
TABLET, FILM COATED ORAL
Qty: 6 TABLET | Refills: 0 | Status: SHIPPED | OUTPATIENT
Start: 2020-10-26 | End: 2021-01-01

## 2020-10-26 RX ORDER — PREDNISONE 10 MG/1
10 TABLET ORAL DAILY
Qty: 20 TABLET | Refills: 0 | Status: SHIPPED | OUTPATIENT
Start: 2020-10-26 | End: 2021-01-01

## 2020-10-26 RX ORDER — PREDNISONE 10 MG/1
10 TABLET ORAL DAILY
Qty: 20 TABLET | Refills: 0 | Status: SHIPPED | OUTPATIENT
Start: 2020-10-26 | End: 2020-10-26 | Stop reason: SDUPTHER

## 2020-10-26 RX ORDER — NIFEDIPINE 90 MG/1
TABLET, EXTENDED RELEASE ORAL
Qty: 90 TABLET | Refills: 3 | Status: SHIPPED | OUTPATIENT
Start: 2020-10-26 | End: 2021-01-01 | Stop reason: SDUPTHER

## 2020-10-26 RX ORDER — EZETIMIBE 10 MG/1
TABLET ORAL
Qty: 90 TABLET | Refills: 3 | Status: SHIPPED | OUTPATIENT
Start: 2020-10-26 | End: 2021-01-01 | Stop reason: SDUPTHER

## 2020-10-26 RX ORDER — TICAGRELOR 90 MG/1
TABLET ORAL
Qty: 180 TABLET | Refills: 3 | Status: SHIPPED | OUTPATIENT
Start: 2020-10-26 | End: 2021-01-01

## 2020-10-26 NOTE — TELEPHONE ENCOUNTER
Patient called on call as not addressed before clinic closed on Friday wanting antibiotic, patient didn't answer call back and called answering service and told them his PCP didn't authorize and he hasn't seen PCP this year, recommend FU apt to eval

## 2020-10-26 NOTE — TELEPHONE ENCOUNTER
Due for appointment to be seen.  See if he has enough to last until he can schedule appointment for us if not we will send in a few meds

## 2020-10-26 NOTE — PROGRESS NOTES
Subjective   Maik Lara is a 65 y.o. male.   There is no height or weight on file to calculate BMI.  History of Present Illness   You have chosen to receive care through a telephone visit. Do you consent to use a telephone visit for your medical care today? Yes  Clindamycin caused lips to swell as do a drug allergy.  Currently with sinus pressure and greenish-yellowish better with 1 round of Z-Roberto would like another 1 which is historically been very effective for him some coughing but no sputum but is himself wheeze some let patient take prednisone he has not checked his blood pressure additionally he will need swing by and get blood pressure check as well as get it updated drug contract for both Provigil and Ambien as well as urine drug screen blood pressure check on that visit as well.  If he still wheezing will need to schedule regular visit and do a chest x-ray in addition if he does not respond to above treatment      Include amlodipine/Benzapril, mention about swell causing angioedema Cipro swelling clindamycin lip swelling Hoppes oil and sulfa antibiotics.  Patient has a non-smoker.  Family history for positive heart disease.  Review of Systems   HENT: Positive for rhinorrhea and sinus pressure.    Respiratory: Positive for wheezing.    All other systems reviewed and are negative.      Objective   There were no vitals filed for this visit.      Physical Exam    Lab Results   Component Value Date    INR 1.1 08/21/2018       Procedures    Assessment/Plan  This visit has been rescheduled as a phone visit to comply with patient safety concerns in accordance with CDC recommendations. Total time of discussion was 25minutes.    1.  Acute sinusitis plan Z-Roberto second round if not improved in 2 days follow-up physical be seen    2.  Bronchitis bronchospasm plan Z-Roberto as above also prednisone as prednisone 40 mg Ã-2 days, 30 mg Ã-2 days, 20 mg Ã-2 days, 10 mg Ã-2 days.  Chest x-ray with visit if not resolved 10 days  time    3.  Hyperlipidemia get up values    4.  Hypertension patient does need a blood pressure check when he comes in for lab BX which is scheduled    5.  Sleep apnea does require as needed Provigil for this does use CPAP machine    6.  Insomnia recurrent will need updated medication monitoring regarding his Ambien.    7.  Medication monitoring await pending urine drug screen drug contract and Theo report.    Much of this encounter note is an electronic transcription/translation of spoken language to printed text.  The electronic translation of spoken language may permit erroneous, or at times, nonsensical words or phrases to be inadvertently transcribed.  Although I have reviewed the note for such errors, some may still exist. If there are questions or for further clarification, please contact me.  There are no diagnoses linked to this encounter.

## 2020-11-11 ENCOUNTER — LAB (OUTPATIENT)
Dept: FAMILY MEDICINE CLINIC | Facility: CLINIC | Age: 65
End: 2020-11-11

## 2020-11-11 ENCOUNTER — CLINICAL SUPPORT (OUTPATIENT)
Dept: FAMILY MEDICINE CLINIC | Facility: CLINIC | Age: 65
End: 2020-11-11

## 2020-11-11 DIAGNOSIS — Z51.81 MEDICATION MONITORING ENCOUNTER: ICD-10-CM

## 2020-11-11 PROCEDURE — G0008 ADMIN INFLUENZA VIRUS VAC: HCPCS | Performed by: INTERNAL MEDICINE

## 2020-11-11 PROCEDURE — 90694 VACC AIIV4 NO PRSRV 0.5ML IM: CPT | Performed by: INTERNAL MEDICINE

## 2020-11-12 RX ORDER — ZOLPIDEM TARTRATE 10 MG/1
10 TABLET ORAL NIGHTLY PRN
Qty: 90 TABLET | Refills: 0 | Status: SHIPPED | OUTPATIENT
Start: 2020-11-12 | End: 2021-02-22 | Stop reason: SDUPTHER

## 2020-11-15 LAB — DRUGS UR: NORMAL

## 2020-11-23 RX ORDER — METOPROLOL TARTRATE 50 MG/1
TABLET, FILM COATED ORAL
Qty: 180 TABLET | Refills: 3 | Status: SHIPPED | OUTPATIENT
Start: 2020-11-23 | End: 2022-01-01 | Stop reason: SDUPTHER

## 2020-11-23 RX ORDER — FUROSEMIDE 20 MG/1
TABLET ORAL
Qty: 270 TABLET | Refills: 3 | Status: SHIPPED | OUTPATIENT
Start: 2020-11-23

## 2020-11-23 RX ORDER — ALLOPURINOL 300 MG/1
TABLET ORAL
Qty: 90 TABLET | Refills: 3 | Status: SHIPPED | OUTPATIENT
Start: 2020-11-23 | End: 2021-01-01 | Stop reason: SDUPTHER

## 2020-12-21 RX ORDER — CYCLOBENZAPRINE HCL 10 MG
TABLET ORAL
Qty: 30 TABLET | Refills: 0 | Status: SHIPPED | OUTPATIENT
Start: 2020-12-21 | End: 2021-01-01
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
## 2021-01-01 ENCOUNTER — TREATMENT (OUTPATIENT)
Dept: PHYSICAL THERAPY | Facility: CLINIC | Age: 66
End: 2021-01-01

## 2021-01-01 ENCOUNTER — TELEPHONE (OUTPATIENT)
Dept: PHYSICAL THERAPY | Facility: CLINIC | Age: 66
End: 2021-01-01

## 2021-01-01 ENCOUNTER — TELEPHONE (OUTPATIENT)
Dept: FAMILY MEDICINE CLINIC | Facility: CLINIC | Age: 66
End: 2021-01-01

## 2021-01-01 ENCOUNTER — TRANSCRIBE ORDERS (OUTPATIENT)
Dept: CARDIOLOGY | Facility: CLINIC | Age: 66
End: 2021-01-01

## 2021-01-01 ENCOUNTER — CLINICAL SUPPORT (OUTPATIENT)
Dept: FAMILY MEDICINE CLINIC | Facility: CLINIC | Age: 66
End: 2021-01-01

## 2021-01-01 ENCOUNTER — TELEPHONE (OUTPATIENT)
Dept: ORTHOPEDICS | Facility: OTHER | Age: 66
End: 2021-01-01

## 2021-01-01 ENCOUNTER — IMMUNIZATION (OUTPATIENT)
Dept: VACCINE CLINIC | Facility: HOSPITAL | Age: 66
End: 2021-01-01

## 2021-01-01 ENCOUNTER — LAB (OUTPATIENT)
Dept: LAB | Facility: HOSPITAL | Age: 66
End: 2021-01-01

## 2021-01-01 ENCOUNTER — OFFICE VISIT (OUTPATIENT)
Dept: FAMILY MEDICINE CLINIC | Facility: CLINIC | Age: 66
End: 2021-01-01

## 2021-01-01 ENCOUNTER — TELEPHONE (OUTPATIENT)
Dept: CARDIOLOGY | Facility: CLINIC | Age: 66
End: 2021-01-01

## 2021-01-01 ENCOUNTER — OFFICE VISIT (OUTPATIENT)
Dept: CARDIOLOGY | Facility: CLINIC | Age: 66
End: 2021-01-01

## 2021-01-01 ENCOUNTER — HOSPITAL ENCOUNTER (OUTPATIENT)
Facility: HOSPITAL | Age: 66
Setting detail: HOSPITAL OUTPATIENT SURGERY
Discharge: HOME | End: 2021-12-27
Attending: INTERNAL MEDICINE | Admitting: INTERNAL MEDICINE

## 2021-01-01 ENCOUNTER — LAB (OUTPATIENT)
Dept: FAMILY MEDICINE CLINIC | Facility: CLINIC | Age: 66
End: 2021-01-01

## 2021-01-01 ENCOUNTER — BULK ORDERING (OUTPATIENT)
Dept: CASE MANAGEMENT | Facility: OTHER | Age: 66
End: 2021-01-01

## 2021-01-01 VITALS
SYSTOLIC BLOOD PRESSURE: 154 MMHG | HEART RATE: 75 BPM | BODY MASS INDEX: 44.1 KG/M2 | TEMPERATURE: 98.5 F | RESPIRATION RATE: 18 BRPM | DIASTOLIC BLOOD PRESSURE: 86 MMHG | HEIGHT: 71 IN | WEIGHT: 315 LBS | OXYGEN SATURATION: 96 %

## 2021-01-01 VITALS
WEIGHT: 315 LBS | HEART RATE: 85 BPM | HEIGHT: 72 IN | SYSTOLIC BLOOD PRESSURE: 130 MMHG | DIASTOLIC BLOOD PRESSURE: 80 MMHG | BODY MASS INDEX: 42.66 KG/M2

## 2021-01-01 VITALS
BODY MASS INDEX: 44.1 KG/M2 | SYSTOLIC BLOOD PRESSURE: 140 MMHG | HEART RATE: 81 BPM | WEIGHT: 315 LBS | DIASTOLIC BLOOD PRESSURE: 80 MMHG | HEIGHT: 71 IN

## 2021-01-01 VITALS
OXYGEN SATURATION: 98 % | HEIGHT: 72 IN | WEIGHT: 315 LBS | HEART RATE: 95 BPM | SYSTOLIC BLOOD PRESSURE: 152 MMHG | DIASTOLIC BLOOD PRESSURE: 80 MMHG | BODY MASS INDEX: 42.66 KG/M2 | TEMPERATURE: 97.7 F

## 2021-01-01 DIAGNOSIS — R26.9 ABNORMAL GAIT: ICD-10-CM

## 2021-01-01 DIAGNOSIS — G47.33 OSA (OBSTRUCTIVE SLEEP APNEA): ICD-10-CM

## 2021-01-01 DIAGNOSIS — R29.3 POOR POSTURE: ICD-10-CM

## 2021-01-01 DIAGNOSIS — G47.00 INSOMNIA, UNSPECIFIED TYPE: ICD-10-CM

## 2021-01-01 DIAGNOSIS — R29.898 LOSS OF MOVEMENT: ICD-10-CM

## 2021-01-01 DIAGNOSIS — Z13.6 SCREENING FOR ISCHEMIC HEART DISEASE: ICD-10-CM

## 2021-01-01 DIAGNOSIS — R29.898 LEG WEAKNESS, BILATERAL: Primary | ICD-10-CM

## 2021-01-01 DIAGNOSIS — G47.30 SLEEP APNEA, UNSPECIFIED TYPE: ICD-10-CM

## 2021-01-01 DIAGNOSIS — R39.11 URINARY HESITANCY DUE TO BENIGN PROSTATIC HYPERPLASIA: Primary | ICD-10-CM

## 2021-01-01 DIAGNOSIS — I27.21 PAH (PULMONARY ARTERIAL HYPERTENSION) WITH PORTAL HYPERTENSION (HCC): ICD-10-CM

## 2021-01-01 DIAGNOSIS — Z01.818 OTHER SPECIFIED PRE-OPERATIVE EXAMINATION: ICD-10-CM

## 2021-01-01 DIAGNOSIS — Z01.810 PRE-OPERATIVE CARDIOVASCULAR EXAMINATION: ICD-10-CM

## 2021-01-01 DIAGNOSIS — Z01.818 OTHER SPECIFIED PRE-OPERATIVE EXAMINATION: Primary | ICD-10-CM

## 2021-01-01 DIAGNOSIS — J30.9 ALLERGIC RHINITIS, UNSPECIFIED SEASONALITY, UNSPECIFIED TRIGGER: ICD-10-CM

## 2021-01-01 DIAGNOSIS — K76.6 PAH (PULMONARY ARTERIAL HYPERTENSION) WITH PORTAL HYPERTENSION (HCC): ICD-10-CM

## 2021-01-01 DIAGNOSIS — I20.0 UNSTABLE ANGINA: ICD-10-CM

## 2021-01-01 DIAGNOSIS — R39.11 URINARY HESITANCY DUE TO BENIGN PROSTATIC HYPERPLASIA: ICD-10-CM

## 2021-01-01 DIAGNOSIS — N40.1 URINARY HESITANCY DUE TO BENIGN PROSTATIC HYPERPLASIA: ICD-10-CM

## 2021-01-01 DIAGNOSIS — Z23 IMMUNIZATION DUE: ICD-10-CM

## 2021-01-01 DIAGNOSIS — Z51.81 MEDICATION MONITORING ENCOUNTER: ICD-10-CM

## 2021-01-01 DIAGNOSIS — Z12.5 SPECIAL SCREENING FOR MALIGNANT NEOPLASM OF PROSTATE: ICD-10-CM

## 2021-01-01 DIAGNOSIS — I10 BENIGN ESSENTIAL HTN: ICD-10-CM

## 2021-01-01 DIAGNOSIS — I20.0 UNSTABLE ANGINA (HCC): Primary | ICD-10-CM

## 2021-01-01 DIAGNOSIS — J01.00 ACUTE MAXILLARY SINUSITIS, RECURRENCE NOT SPECIFIED: Primary | ICD-10-CM

## 2021-01-01 DIAGNOSIS — F51.01 PRIMARY INSOMNIA: ICD-10-CM

## 2021-01-01 DIAGNOSIS — E78.5 DYSLIPIDEMIA: ICD-10-CM

## 2021-01-01 DIAGNOSIS — N40.1 URINARY HESITANCY DUE TO BENIGN PROSTATIC HYPERPLASIA: Primary | ICD-10-CM

## 2021-01-01 DIAGNOSIS — Z95.5 HISTORY OF HEART ARTERY STENT: ICD-10-CM

## 2021-01-01 DIAGNOSIS — Z51.81 ENCOUNTER FOR THERAPEUTIC DRUG LEVEL MONITORING: ICD-10-CM

## 2021-01-01 DIAGNOSIS — G47.00 INSOMNIA, UNSPECIFIED TYPE: Primary | ICD-10-CM

## 2021-01-01 DIAGNOSIS — M54.50 CHRONIC BILATERAL LOW BACK PAIN WITHOUT SCIATICA: ICD-10-CM

## 2021-01-01 DIAGNOSIS — I10 ESSENTIAL HYPERTENSION: ICD-10-CM

## 2021-01-01 DIAGNOSIS — Z51.81 MEDICATION MONITORING ENCOUNTER: Primary | ICD-10-CM

## 2021-01-01 DIAGNOSIS — I25.10 CORONARY ARTERY DISEASE INVOLVING NATIVE CORONARY ARTERY OF NATIVE HEART WITHOUT ANGINA PECTORIS: Primary | ICD-10-CM

## 2021-01-01 DIAGNOSIS — Z76.89 ENCOUNTER TO ESTABLISH CARE: Primary | ICD-10-CM

## 2021-01-01 DIAGNOSIS — G89.29 CHRONIC BILATERAL LOW BACK PAIN WITHOUT SCIATICA: ICD-10-CM

## 2021-01-01 LAB
ALBUMIN SERPL-MCNC: 4.1 G/DL (ref 3.5–5.2)
ALBUMIN/GLOB SERPL: 1.3 G/DL
ALP SERPL-CCNC: 72 U/L (ref 39–117)
ALT SERPL W P-5'-P-CCNC: 17 U/L (ref 1–41)
ANION GAP SERPL CALCULATED.3IONS-SCNC: 10 MMOL/L (ref 5–15)
ANION GAP SERPL CALCULATED.3IONS-SCNC: 12 MMOL/L (ref 5–15)
AST SERPL-CCNC: 15 U/L (ref 1–40)
BASOPHILS # BLD AUTO: 0.02 10*3/MM3 (ref 0–0.2)
BASOPHILS # BLD AUTO: 0.03 10*3/MM3 (ref 0–0.2)
BASOPHILS NFR BLD AUTO: 0.4 % (ref 0–1.5)
BASOPHILS NFR BLD AUTO: 0.5 % (ref 0–1.5)
BILIRUB SERPL-MCNC: 0.3 MG/DL (ref 0–1.2)
BUN SERPL-MCNC: 11 MG/DL (ref 8–23)
BUN SERPL-MCNC: 14 MG/DL (ref 8–23)
BUN/CREAT SERPL: 12.7 (ref 7–25)
BUN/CREAT SERPL: 18.3 (ref 7–25)
CALCIUM SPEC-SCNC: 9.3 MG/DL (ref 8.6–10.5)
CALCIUM SPEC-SCNC: 9.6 MG/DL (ref 8.6–10.5)
CHLORIDE SERPL-SCNC: 105 MMOL/L (ref 98–107)
CHLORIDE SERPL-SCNC: 105 MMOL/L (ref 98–107)
CHOLEST SERPL-MCNC: 200 MG/DL (ref 0–200)
CO2 SERPL-SCNC: 28 MMOL/L (ref 22–29)
CO2 SERPL-SCNC: 29 MMOL/L (ref 22–29)
CREAT SERPL-MCNC: 0.6 MG/DL (ref 0.76–1.27)
CREAT SERPL-MCNC: 1.1 MG/DL (ref 0.76–1.27)
DEPRECATED RDW RBC AUTO: 44 FL (ref 37–54)
DEPRECATED RDW RBC AUTO: 46.5 FL (ref 37–54)
DRUGS UR: NORMAL
EOSINOPHIL # BLD AUTO: 0.54 10*3/MM3 (ref 0–0.4)
EOSINOPHIL # BLD AUTO: 0.62 10*3/MM3 (ref 0–0.4)
EOSINOPHIL NFR BLD AUTO: 10.3 % (ref 0.3–6.2)
EOSINOPHIL NFR BLD AUTO: 10.6 % (ref 0.3–6.2)
ERYTHROCYTE [DISTWIDTH] IN BLOOD BY AUTOMATED COUNT: 13.7 % (ref 12.3–15.4)
ERYTHROCYTE [DISTWIDTH] IN BLOOD BY AUTOMATED COUNT: 13.8 % (ref 12.3–15.4)
GFR SERPL CREATININE-BSD FRML MDRD: 81 ML/MIN/1.73
GFR SERPL CREATININE-BSD FRML MDRD: >150 ML/MIN/1.73
GLOBULIN UR ELPH-MCNC: 3.1 GM/DL
GLUCOSE SERPL-MCNC: 93 MG/DL (ref 65–99)
GLUCOSE SERPL-MCNC: 97 MG/DL (ref 65–99)
HCT VFR BLD AUTO: 43.3 % (ref 37.5–51)
HCT VFR BLD AUTO: 43.5 % (ref 37.5–51)
HDLC SERPL-MCNC: 42 MG/DL (ref 40–60)
HGB BLD-MCNC: 13.9 G/DL (ref 13–17.7)
HGB BLD-MCNC: 14.2 G/DL (ref 13–17.7)
IMM GRANULOCYTES # BLD AUTO: 0.01 10*3/MM3 (ref 0–0.05)
IMM GRANULOCYTES # BLD AUTO: 0.01 10*3/MM3 (ref 0–0.05)
IMM GRANULOCYTES NFR BLD AUTO: 0.2 % (ref 0–0.5)
IMM GRANULOCYTES NFR BLD AUTO: 0.2 % (ref 0–0.5)
LDLC SERPL CALC-MCNC: 138 MG/DL (ref 0–100)
LDLC/HDLC SERPL: 3.25 {RATIO}
LYMPHOCYTES # BLD AUTO: 1.46 10*3/MM3 (ref 0.7–3.1)
LYMPHOCYTES # BLD AUTO: 2.03 10*3/MM3 (ref 0.7–3.1)
LYMPHOCYTES NFR BLD AUTO: 27.9 % (ref 19.6–45.3)
LYMPHOCYTES NFR BLD AUTO: 34.8 % (ref 19.6–45.3)
MCH RBC QN AUTO: 28.7 PG (ref 26.6–33)
MCH RBC QN AUTO: 29.8 PG (ref 26.6–33)
MCHC RBC AUTO-ENTMCNC: 32.1 G/DL (ref 31.5–35.7)
MCHC RBC AUTO-ENTMCNC: 32.6 G/DL (ref 31.5–35.7)
MCV RBC AUTO: 89.3 FL (ref 79–97)
MCV RBC AUTO: 91.2 FL (ref 79–97)
MONOCYTES # BLD AUTO: 0.52 10*3/MM3 (ref 0.1–0.9)
MONOCYTES # BLD AUTO: 0.64 10*3/MM3 (ref 0.1–0.9)
MONOCYTES NFR BLD AUTO: 11 % (ref 5–12)
MONOCYTES NFR BLD AUTO: 9.9 % (ref 5–12)
NEUTROPHILS NFR BLD AUTO: 2.5 10*3/MM3 (ref 1.7–7)
NEUTROPHILS NFR BLD AUTO: 2.68 10*3/MM3 (ref 1.7–7)
NEUTROPHILS NFR BLD AUTO: 42.9 % (ref 42.7–76)
NEUTROPHILS NFR BLD AUTO: 51.3 % (ref 42.7–76)
NRBC BLD AUTO-RTO: 0 /100 WBC (ref 0–0.2)
NRBC BLD AUTO-RTO: 0 /100 WBC (ref 0–0.2)
PLATELET # BLD AUTO: 233 10*3/MM3 (ref 140–450)
PLATELET # BLD AUTO: 255 10*3/MM3 (ref 140–450)
PMV BLD AUTO: 9.6 FL (ref 6–12)
PMV BLD AUTO: 9.6 FL (ref 6–12)
POTASSIUM SERPL-SCNC: 3.6 MMOL/L (ref 3.5–5.2)
POTASSIUM SERPL-SCNC: 4 MMOL/L (ref 3.5–5.2)
PROT SERPL-MCNC: 7.2 G/DL (ref 6–8.5)
PSA SERPL-MCNC: 4.37 NG/ML (ref 0–4)
RBC # BLD AUTO: 4.77 10*6/MM3 (ref 4.14–5.8)
RBC # BLD AUTO: 4.85 10*6/MM3 (ref 4.14–5.8)
SARS-COV-2 ORF1AB RESP QL NAA+PROBE: NOT DETECTED
SODIUM SERPL-SCNC: 144 MMOL/L (ref 136–145)
SODIUM SERPL-SCNC: 145 MMOL/L (ref 136–145)
TRIGL SERPL-MCNC: 108 MG/DL (ref 0–150)
TSH SERPL DL<=0.05 MIU/L-ACNC: 2.81 UIU/ML (ref 0.27–4.2)
VLDLC SERPL-MCNC: 20 MG/DL (ref 5–40)
WBC # BLD AUTO: 5.23 10*3/MM3 (ref 3.4–10.8)
WBC NRBC COR # BLD: 5.83 10*3/MM3 (ref 3.4–10.8)

## 2021-01-01 PROCEDURE — 80048 BASIC METABOLIC PNL TOTAL CA: CPT

## 2021-01-01 PROCEDURE — 97110 THERAPEUTIC EXERCISES: CPT | Performed by: PHYSICAL THERAPIST

## 2021-01-01 PROCEDURE — 97112 NEUROMUSCULAR REEDUCATION: CPT | Performed by: PHYSICAL THERAPIST

## 2021-01-01 PROCEDURE — 93458 L HRT ARTERY/VENTRICLE ANGIO: CPT | Performed by: INTERNAL MEDICINE

## 2021-01-01 PROCEDURE — 25010000002 HEPARIN (PORCINE) PER 1000 UNITS: Performed by: INTERNAL MEDICINE

## 2021-01-01 PROCEDURE — 90662 IIV NO PRSV INCREASED AG IM: CPT | Performed by: NURSE PRACTITIONER

## 2021-01-01 PROCEDURE — 80053 COMPREHEN METABOLIC PANEL: CPT | Performed by: NURSE PRACTITIONER

## 2021-01-01 PROCEDURE — 80061 LIPID PANEL: CPT | Performed by: NURSE PRACTITIONER

## 2021-01-01 PROCEDURE — 25010000002 MIDAZOLAM PER 1 MG: Performed by: INTERNAL MEDICINE

## 2021-01-01 PROCEDURE — 93000 ELECTROCARDIOGRAM COMPLETE: CPT | Performed by: NURSE PRACTITIONER

## 2021-01-01 PROCEDURE — 85025 COMPLETE CBC W/AUTO DIFF WBC: CPT

## 2021-01-01 PROCEDURE — G0103 PSA SCREENING: HCPCS | Performed by: NURSE PRACTITIONER

## 2021-01-01 PROCEDURE — G0008 ADMIN INFLUENZA VIRUS VAC: HCPCS | Performed by: NURSE PRACTITIONER

## 2021-01-01 PROCEDURE — 97140 MANUAL THERAPY 1/> REGIONS: CPT | Performed by: PHYSICAL THERAPIST

## 2021-01-01 PROCEDURE — 99214 OFFICE O/P EST MOD 30 MIN: CPT | Performed by: NURSE PRACTITIONER

## 2021-01-01 PROCEDURE — 0004A HC ADM SARSCOV2 30MCG/0.3ML BOOSTER: CPT | Performed by: INTERNAL MEDICINE

## 2021-01-01 PROCEDURE — 97113 AQUATIC THERAPY/EXERCISES: CPT | Performed by: PHYSICAL THERAPIST

## 2021-01-01 PROCEDURE — 91300 HC SARSCOV02 VAC 30MCG/0.3ML IM: CPT | Performed by: INTERNAL MEDICINE

## 2021-01-01 PROCEDURE — 99441 PR PHYS/QHP TELEPHONE EVALUATION 5-10 MIN: CPT | Performed by: NURSE PRACTITIONER

## 2021-01-01 PROCEDURE — 36415 COLL VENOUS BLD VENIPUNCTURE: CPT

## 2021-01-01 PROCEDURE — 0 IOPAMIDOL PER 1 ML: Performed by: INTERNAL MEDICINE

## 2021-01-01 PROCEDURE — C1894 INTRO/SHEATH, NON-LASER: HCPCS | Performed by: INTERNAL MEDICINE

## 2021-01-01 PROCEDURE — 84443 ASSAY THYROID STIM HORMONE: CPT | Performed by: NURSE PRACTITIONER

## 2021-01-01 PROCEDURE — C9803 HOPD COVID-19 SPEC COLLECT: HCPCS

## 2021-01-01 PROCEDURE — 97162 PT EVAL MOD COMPLEX 30 MIN: CPT | Performed by: PHYSICAL THERAPIST

## 2021-01-01 PROCEDURE — U0004 COV-19 TEST NON-CDC HGH THRU: HCPCS

## 2021-01-01 PROCEDURE — C1769 GUIDE WIRE: HCPCS | Performed by: INTERNAL MEDICINE

## 2021-01-01 PROCEDURE — 25010000002 FENTANYL CITRATE (PF) 50 MCG/ML SOLUTION: Performed by: INTERNAL MEDICINE

## 2021-01-01 PROCEDURE — 99214 OFFICE O/P EST MOD 30 MIN: CPT | Performed by: INTERNAL MEDICINE

## 2021-01-01 PROCEDURE — 85025 COMPLETE CBC W/AUTO DIFF WBC: CPT | Performed by: NURSE PRACTITIONER

## 2021-01-01 PROCEDURE — 36415 COLL VENOUS BLD VENIPUNCTURE: CPT | Performed by: NURSE PRACTITIONER

## 2021-01-01 RX ORDER — ZOLPIDEM TARTRATE 10 MG/1
TABLET ORAL
Qty: 30 TABLET | Refills: 0 | OUTPATIENT
Start: 2021-01-01

## 2021-01-01 RX ORDER — FLUTICASONE PROPIONATE 50 MCG
SPRAY, SUSPENSION (ML) NASAL
Qty: 48 G | Refills: 3 | Status: SHIPPED | OUTPATIENT
Start: 2021-01-01

## 2021-01-01 RX ORDER — ACETAMINOPHEN 325 MG/1
650 TABLET ORAL EVERY 4 HOURS PRN
Status: DISCONTINUED | OUTPATIENT
Start: 2021-01-01 | End: 2021-01-01 | Stop reason: HOSPADM

## 2021-01-01 RX ORDER — ZOLPIDEM TARTRATE 10 MG/1
TABLET ORAL
Qty: 30 TABLET | Refills: 0 | Status: SHIPPED | OUTPATIENT
Start: 2021-01-01 | End: 2021-01-01

## 2021-01-01 RX ORDER — VENLAFAXINE HYDROCHLORIDE 75 MG/1
CAPSULE, EXTENDED RELEASE ORAL
Qty: 180 CAPSULE | Refills: 3 | Status: SHIPPED | OUTPATIENT
Start: 2021-01-01 | End: 2021-01-01 | Stop reason: SDUPTHER

## 2021-01-01 RX ORDER — SODIUM CHLORIDE 0.9 % (FLUSH) 0.9 %
10 SYRINGE (ML) INJECTION AS NEEDED
Status: DISCONTINUED | OUTPATIENT
Start: 2021-01-01 | End: 2021-01-01 | Stop reason: HOSPADM

## 2021-01-01 RX ORDER — ZOLPIDEM TARTRATE 10 MG/1
TABLET ORAL
Qty: 30 TABLET | Refills: 0 | Status: SHIPPED | OUTPATIENT
Start: 2021-01-01 | End: 2021-01-01 | Stop reason: SDUPTHER

## 2021-01-01 RX ORDER — SPIRONOLACTONE 25 MG/1
50 TABLET ORAL DAILY
Qty: 90 TABLET | Refills: 3
Start: 2021-01-01

## 2021-01-01 RX ORDER — ALLOPURINOL 300 MG/1
300 TABLET ORAL DAILY
Qty: 90 TABLET | Refills: 0 | Status: SHIPPED | OUTPATIENT
Start: 2021-01-01 | End: 2022-01-01

## 2021-01-01 RX ORDER — SODIUM CHLORIDE 0.9 % (FLUSH) 0.9 %
3 SYRINGE (ML) INJECTION EVERY 12 HOURS SCHEDULED
Status: DISCONTINUED | OUTPATIENT
Start: 2021-01-01 | End: 2021-01-01 | Stop reason: HOSPADM

## 2021-01-01 RX ORDER — CITALOPRAM 40 MG/1
40 TABLET ORAL DAILY
COMMUNITY
End: 2021-01-01 | Stop reason: SDUPTHER

## 2021-01-01 RX ORDER — LIDOCAINE HYDROCHLORIDE 20 MG/ML
INJECTION, SOLUTION INFILTRATION; PERINEURAL AS NEEDED
Status: DISCONTINUED | OUTPATIENT
Start: 2021-01-01 | End: 2021-01-01 | Stop reason: HOSPADM

## 2021-01-01 RX ORDER — ZOLPIDEM TARTRATE 10 MG/1
10 TABLET ORAL NIGHTLY PRN
Qty: 7 TABLET | Refills: 0 | Status: SHIPPED | OUTPATIENT
Start: 2021-01-01 | End: 2022-01-01

## 2021-01-01 RX ORDER — FENTANYL CITRATE 50 UG/ML
INJECTION, SOLUTION INTRAMUSCULAR; INTRAVENOUS AS NEEDED
Status: DISCONTINUED | OUTPATIENT
Start: 2021-01-01 | End: 2021-01-01 | Stop reason: HOSPADM

## 2021-01-01 RX ORDER — NIFEDIPINE 90 MG/1
90 TABLET, EXTENDED RELEASE ORAL DAILY
Qty: 90 TABLET | Refills: 1 | Status: SHIPPED | OUTPATIENT
Start: 2021-01-01

## 2021-01-01 RX ORDER — AMOXICILLIN 500 MG/1
1000 CAPSULE ORAL 2 TIMES DAILY
Qty: 4 CAPSULE | Refills: 0 | Status: ON HOLD | OUTPATIENT
Start: 2021-01-01 | End: 2021-01-01

## 2021-01-01 RX ORDER — MIDAZOLAM HYDROCHLORIDE 1 MG/ML
INJECTION INTRAMUSCULAR; INTRAVENOUS AS NEEDED
Status: DISCONTINUED | OUTPATIENT
Start: 2021-01-01 | End: 2021-01-01 | Stop reason: HOSPADM

## 2021-01-01 RX ORDER — SODIUM CHLORIDE 9 MG/ML
1-3 INJECTION, SOLUTION INTRAVENOUS CONTINUOUS
Status: DISCONTINUED | OUTPATIENT
Start: 2021-01-01 | End: 2021-01-01 | Stop reason: HOSPADM

## 2021-01-01 RX ORDER — ZOLPIDEM TARTRATE 10 MG/1
10 TABLET ORAL NIGHTLY PRN
Qty: 30 TABLET | Refills: 0 | Status: SHIPPED | OUTPATIENT
Start: 2021-01-01 | End: 2022-01-01 | Stop reason: SDUPTHER

## 2021-01-01 RX ORDER — VENLAFAXINE HYDROCHLORIDE 75 MG/1
CAPSULE, EXTENDED RELEASE ORAL
Qty: 180 CAPSULE | Refills: 3 | Status: SHIPPED | OUTPATIENT
Start: 2021-01-01

## 2021-01-01 RX ORDER — AMOXICILLIN 875 MG/1
875 TABLET, COATED ORAL 2 TIMES DAILY
Qty: 20 TABLET | Refills: 0 | Status: SHIPPED | OUTPATIENT
Start: 2021-01-01 | End: 2021-01-01

## 2021-01-01 RX ORDER — SODIUM CHLORIDE 0.9 % (FLUSH) 0.9 %
10 SYRINGE (ML) INJECTION EVERY 12 HOURS SCHEDULED
Status: DISCONTINUED | OUTPATIENT
Start: 2021-01-01 | End: 2021-01-01 | Stop reason: HOSPADM

## 2021-01-01 RX ORDER — NIFEDIPINE 90 MG/1
90 TABLET, EXTENDED RELEASE ORAL DAILY
Qty: 90 TABLET | Refills: 1 | Status: SHIPPED | OUTPATIENT
Start: 2021-01-01 | End: 2021-01-01 | Stop reason: SDUPTHER

## 2021-01-01 RX ORDER — ZOLPIDEM TARTRATE 10 MG/1
10 TABLET ORAL NIGHTLY PRN
Qty: 30 TABLET | Refills: 0 | Status: SHIPPED | OUTPATIENT
Start: 2021-01-01 | End: 2021-01-01

## 2021-01-01 RX ORDER — CITALOPRAM 40 MG/1
40 TABLET ORAL DAILY
Qty: 90 TABLET | Refills: 1 | Status: SHIPPED | OUTPATIENT
Start: 2021-01-01

## 2021-01-01 RX ORDER — MONTELUKAST SODIUM 10 MG/1
10 TABLET ORAL DAILY
COMMUNITY
Start: 2021-01-01 | End: 2022-04-02

## 2021-01-01 RX ORDER — CYCLOBENZAPRINE HCL 10 MG
TABLET ORAL
Qty: 30 TABLET | Refills: 0 | Status: SHIPPED | OUTPATIENT
Start: 2021-01-01

## 2021-01-01 RX ORDER — CLOTRIMAZOLE AND BETAMETHASONE DIPROPIONATE 10; .64 MG/G; MG/G
CREAM TOPICAL 2 TIMES DAILY
Qty: 60 G | Refills: 11 | Status: SHIPPED | OUTPATIENT
Start: 2021-01-01

## 2021-01-01 RX ORDER — AMOXICILLIN 875 MG/1
875 TABLET, COATED ORAL 2 TIMES DAILY
Qty: 14 TABLET | Refills: 0 | Status: SHIPPED | OUTPATIENT
Start: 2021-01-01 | End: 2021-01-01 | Stop reason: SDUPTHER

## 2021-01-01 RX ORDER — ZOLPIDEM TARTRATE 10 MG/1
10 TABLET ORAL NIGHTLY PRN
Qty: 7 TABLET | Refills: 0 | OUTPATIENT
Start: 2021-01-01

## 2021-01-01 RX ORDER — EZETIMIBE 10 MG/1
10 TABLET ORAL DAILY
Qty: 90 TABLET | Refills: 3 | Status: SHIPPED | OUTPATIENT
Start: 2021-01-01

## 2021-01-01 RX ADMIN — ACETAMINOPHEN 650 MG: 325 TABLET, FILM COATED ORAL at 10:58

## 2021-02-22 DIAGNOSIS — G47.00 INSOMNIA, UNSPECIFIED TYPE: Primary | ICD-10-CM

## 2021-02-22 RX ORDER — FLUTICASONE PROPIONATE 50 MCG
2 SPRAY, SUSPENSION (ML) NASAL DAILY
Qty: 3 G | Refills: 0 | Status: SHIPPED | OUTPATIENT
Start: 2021-02-22 | End: 2021-01-01

## 2021-02-22 RX ORDER — ZOLPIDEM TARTRATE 10 MG/1
10 TABLET ORAL NIGHTLY PRN
Qty: 30 TABLET | Refills: 0 | Status: SHIPPED | OUTPATIENT
Start: 2021-02-22 | End: 2021-01-01 | Stop reason: SDUPTHER

## 2021-02-24 ENCOUNTER — TELEPHONE (OUTPATIENT)
Dept: FAMILY MEDICINE CLINIC | Facility: CLINIC | Age: 66
End: 2021-02-24

## 2021-03-31 NOTE — TELEPHONE ENCOUNTER
Dr Smith retired 12/20 and pt should have been told that needs to establish with new PCP. He has no contract in chart. He can make apt and I can fill until he sees someone in the office

## 2021-03-31 NOTE — TELEPHONE ENCOUNTER
Caller: Maik Lara    Relationship: Self    Best call back number: [phone]    Medication needed:   Requested Prescriptions     Pending Prescriptions Disp Refills   • zolpidem (AMBIEN) 10 MG tablet 30 tablet 0     Sig: Take 1 tablet by mouth At Night As Needed for Sleep.       When do you need the refill by: 03/31/21    What additional details did the patient provide when requesting the medication: PATIENT SAID HE IS OUT OF THIS MEDICATION, SAID ON HIS LAST REFILL HE RECEIVED A 30 DAY SUPPLY, PATIENT SAID HE NORMALLY RECEIVES A 90 DAY SUPPLY AND IS WANTING TO SEE IF HE CAN GET THAT NOW    PATIENT SAID [facility] IS ALSO GOING TO CONTACT THE OFFICE ABOUT THE REFILL    Does the patient have less than a 3 day supply:  [x] Yes  [] No    What is the patient's preferred pharmacy: [facility] - [address] [phone] [facility] [phone]

## 2021-04-14 NOTE — TELEPHONE ENCOUNTER
It could be that he has Covid.  He should be checked.  Loss of taste and smell is not usually a complication of the vaccine.  Suggest urgent care center that test for these.

## 2021-04-14 NOTE — TELEPHONE ENCOUNTER
Caller: Maik Lara    Relationship to patient: Self    Best call back number: [phone]    Date of exposure: N/A    Date of positive COVID19 test: N/A    Date if possible COVID19 exposure: N/A    COVID19 symptoms: ACHES/CHILLS/SLIGHT LOSS OF TASTE AND SMELL    Date of initial quarantine: N/A    Additional information or concerns:     PATIENT CALLED INITIALLY TO R/S APPT WITH ANGELA THAT WAS MISSED YESTERDAY. THEN HE STATED THAT HE HAD HIS 2ND COVID VACCINATION ON 4/6. THEN TWO DAYS AGO HE STARTED HAVING SYMPTOMS OF ACHES, CHILLS, AND SLIGHT LOSS OF TASTE AND SMELL.    PATIENT BELIEVES THIS TO BE REACTIONS TO THE VACCINE.    PLEASE CALL AND ADVISE.

## 2021-04-30 PROBLEM — I27.21 PAH (PULMONARY ARTERIAL HYPERTENSION) WITH PORTAL HYPERTENSION (HCC): Status: ACTIVE | Noted: 2021-01-01

## 2021-04-30 PROBLEM — K76.6 PAH (PULMONARY ARTERIAL HYPERTENSION) WITH PORTAL HYPERTENSION (HCC): Status: ACTIVE | Noted: 2021-01-01

## 2021-04-30 NOTE — PATIENT INSTRUCTIONS
Labs today will call with results.   Cont current meds and f/u with specialists.   He will call to make urology f/u as he is established,   Refer to cardiology will call with appt   Refer to PT will call with appt.   He will take nifedipine in am, monitor BP at home, call with elevated readings >140/90.  The patient has read and signed the [facility] Controlled Substance Contract.  I will continue to see patient for regular follow up appointments.  They are well controlled on their medication.  TAMIE is updated every 3 months. The patient is aware of the potential for addiction and dependence.  Patient agrees with plan of care and understands instructions. Call if worsening symptoms or any problems or concerns.

## 2021-04-30 NOTE — PROGRESS NOTES
"Chief Complaint  Establish Care (follow up)    Subjective          Maik Lara presents to [facility] PRIMARY CARE  History of Present Illness  Here today to establish care, prev pcp Dr. Smith, retired, last visit 10/2020, new patient to me today. He is fasting today, denies smoking.   With HTN taking metoprolol 50mg daily, nifedipine 90mg daily, spironolactone 25mg daily, lasix 20mg daily. also taking brilinta and asa daily. Prev cardiology Dr. Kammerling, no recent visit. He would like new cardiology referral today. Hx of stent. Does check BP at home, but thinks machine if off. Does have palpitations at times. Denies CP, SOA. He does have swelling in knees at times. Wearing compression stockings which helps. Denies HA, dizziness. States taking nifedipine in pm.  With HLD, taking zetia 10mg daily. Tolerating well.    Sees pulm for SOA, ayush, Dr. Maciel had visit 4/2/2021, had cxr with pending CT, had spirometry, likely asthma, using breo and singular. Also given nystatin for thrush. With DIAZ, using cpap. Taking ambien 10mg nightly. Also with Pulm HTN. Taking provigil.   With depression, taking effexor 75mg  2 tabs daily, tolerating well, states mood stable.   Thinks last colonoscopy 2 years ago. He does see urology, first urology. States due for appt   With chronic back pain, also left shoulder and neck pain, taking tramadol as needed, last refill 2018, also has flexeril as needed. He takes only as needed, states pain is usually weather changes. States then symptoms resolve.     Objective   Vital Signs:   /80   Pulse 95   Temp 97.7 °F (36.5 °C)   Ht 182.9 cm (72\")   Wt (!) 154 kg (340 lb)   SpO2 98%   BMI 46.11 kg/m²     Physical Exam  Vitals and nursing note reviewed.   Constitutional:       Appearance: He is well-developed.   HENT:      Head: Normocephalic.   Eyes:      Pupils: Pupils are equal, round, and reactive to light.   Cardiovascular:      Rate and Rhythm: Normal rate and " regular rhythm.      Pulses: Normal pulses.      Heart sounds: Normal heart sounds.   Pulmonary:      Effort: Pulmonary effort is normal.      Breath sounds: Normal breath sounds.   Skin:     General: Skin is warm and dry.   Neurological:      Mental Status: He is alert and oriented to person, place, and time.   Psychiatric:         Behavior: Behavior normal.         Judgment: Judgment normal.        Result Review :                 Assessment and Plan    Diagnoses and all orders for this visit:    1. Encounter to establish care (Primary)  -     CBC & Differential  -     Comprehensive Metabolic Panel  -     Lipid Panel  -     TSH    2. DIAZ (obstructive sleep apnea)  -     CBC & Differential  -     Comprehensive Metabolic Panel  -     Lipid Panel  -     TSH    3. Benign essential HTN  -     Ambulatory Referral to Cardiology  -     CBC & Differential  -     Comprehensive Metabolic Panel  -     Lipid Panel  -     TSH    4. Dyslipidemia  -     CBC & Differential  -     Comprehensive Metabolic Panel  -     Lipid Panel  -     TSH    5. Primary insomnia  -     CBC & Differential  -     Comprehensive Metabolic Panel  -     Lipid Panel  -     TSH    6. PAH (pulmonary arterial hypertension) with portal hypertension (CMS/[facility])  -     Ambulatory Referral to Cardiology  -     CBC & Differential  -     Comprehensive Metabolic Panel  -     Lipid Panel  -     TSH    7. History of heart artery stent  -     Ambulatory Referral to Cardiology  -     CBC & Differential  -     Comprehensive Metabolic Panel  -     Lipid Panel  -     TSH    8. Chronic bilateral low back pain without sciatica  -     Ambulatory Referral to Physical Therapy Evaluate and treat    Other orders  -     venlafaxine XR (EFFEXOR-XR) 75 MG 24 hr capsule; 2 tabs qd  Dispense: 180 capsule; Refill: 3        Follow Up   Return in about 6 months (around 10/30/2021), or if symptoms worsen or fail to improve, for Medicare Wellness.  Patient was given instructions and  counseling regarding his condition or for health maintenance advice. Please see specific information pulled into the AVS if appropriate.     Labs today will call with results.   Cont current meds and f/u with specialists.   He will call to make urology f/u as he is established,   Refer to cardiology will call with appt   Refer to PT will call with appt.   He will take nifedipine in am, monitor BP at home, call with elevated readings >140/90.  The patient has read and signed the [facility] Controlled Substance Contract.  I will continue to see patient for regular follow up appointments.  They are well controlled on their medication.  TAMIE is updated every 3 months. The patient is aware of the potential for addiction and dependence.  Patient agrees with plan of care and understands instructions. Call if worsening symptoms or any problems or concerns.

## 2021-05-10 NOTE — TELEPHONE ENCOUNTER
Pharmacy Name: [facility] [address] [place] [address] [phone] [facility] [phone]      Pharmacy representative name: RADHA    Pharmacy representative phone number: [phone] REFERENCE NUMBER: [phone]    What medication are you calling in regards to:  NIFEdipine XL (PROCARDIA XL) 90 MG 24 hr tablet    What question does the pharmacy have: IS THE PATIENT ALLERGIC TO THIS MEDICATION?      Who is the provider that prescribed the medication: JAVIER    Additional notes: RADHA FROM [facility] IS CALLING TO CHECK TO SEE IF THE PATIENT IS ALLERGIC TO THIS MEDICATION. THERE WAS A RECORD OF THE PATIENT BEING ALLERGIC TO A MEDICATION IN THIS CLASS BEFORE AND THEY WANT TO CHECK TO SEE IF IT IS OKAY TO GIVE HIM THIS MEDICATION.

## 2021-05-10 NOTE — TELEPHONE ENCOUNTER
Caller: Maik Lara    Relationship: Self    Best call back number: [phone]     Medication needed:   Requested Prescriptions     Pending Prescriptions Disp Refills   • NIFEdipine XL (PROCARDIA XL) 90 MG 24 hr tablet 90 tablet 3     Sig: Take 1 tablet by mouth Daily.       When do you need the refill by: ASAP      Does the patient have less than a 3 day supply:  [x] Yes  [] No    What [facility] the [facility] [address] [facility] & (FRANCIS SANCHEZ) - [phone] [facility] [phone] FX

## 2021-05-21 NOTE — PROGRESS NOTES
Date of Office Visit: 21  Encounter Provider: VERÓNICA Castañeda  Place of Service: [facility] CARDIOLOGY  Patient Name: Maik Lara  :1955    Chief Complaint   Patient presents with   • Hypertension   :     HPI: Maik Lara is a 65 y.o. male  with history of coronary artery disease status post drug-eluting stent to the circumflex, cervical stenosis, hypertension, hyperlipidemia and morbid obesity.    Was previously followed at [facility].  I will visit with him for the first time today and have reviewed his medical record      In 2018 he was admitted at [facility] with chest pain.  An abnormal stress test he was taken to the Cath Lab where he required drug-eluting stent to the circumflex.  He had some acute kidney injury and also reported some arm pain he was found to have moderate stenosis at C2-C5 on C-spine.  He had an echocardiogram which showed normal left ventricular systolic function.  It was a technically difficult exam but mild mitral regurgitation noted  He later complained of atypical chest pain had a negative perfusion stress test 2020.  He presents for reassessment.  He is noted increased blood pressure over the last couple months.  He has baseline palpitations which is sometimes worse than other times but chronic and intermittent.  He has shortness of breath with exertion.  He reports occasional pin stick in the left chest which lasts just a few seconds and resolve spontaneously.  He wears BiPAP routinely.  He occasionally walks but does no structured exercise.  He has been having some urinary issues with hesitancy and sporadic urinary flow.  He is supposed to see urology.  He wears compression socks to control lower extremity swelling    Allergies   Allergen Reactions   • Amlodipine Besy-Benazepril Hcl Swelling   • Benazepril Angioedema   • Ciprofloxacin Swelling     Tongue, lips   • Clindamycin/Lincomycin Unknown - High Severity   • Hops Oil    •  "Sulfa Antibiotics            Family and social history reviewed.     ROS  All other systems were reviewed and are negative          Objective:     Vitals:    05/21/21 1318   BP: 130/80   Pulse: 85   Weight: (!) 156 kg (343 lb 9.6 oz)   Height: 182.9 cm (72\")     Body mass index is 46.6 kg/m².    PHYSICAL EXAM:  Constitutional:       General: Not in acute distress.     Appearance: Well-developed. Not diaphoretic.      Comments: obese   HENT:      Head: Normocephalic.   Pulmonary:      Effort: Pulmonary effort is normal. No respiratory distress.      Breath sounds: Normal breath sounds. No wheezing. No rhonchi. No rales.   Cardiovascular:      Normal rate. Regular rhythm.      Comments: Compression socks in place bilateral   Pulses:     Radial: 2+ bilaterally.  Skin:     General: Skin is warm and dry. There is no cyanosis.      Findings: No rash.   Neurological:      Mental Status: Alert and oriented to person, place, and time.   Psychiatric:         Behavior: Behavior normal.         Thought Content: Thought content normal.         Judgment: Judgment normal.           ECG 12 Lead    Date/Time: 5/21/2021 1:24 PM  Performed by: April Matrinez APRN  Authorized by: April Martinez APRN   Comparison: compared with previous ECG   Similar to previous ECG  Rhythm: sinus rhythm  Ectopy: bigeminy  Rate: normal              Current Outpatient Medications   Medication Sig Dispense Refill   • acetaminophen (TYLENOL) 650 MG 8 hr tablet Take 1,000 mg by mouth.     • albuterol (PROAIR RESPICLICK) 108 (90 Base) MCG/ACT inhaler Inhale 2 puffs.     • allopurinol (ZYLOPRIM) 300 MG tablet TAKE 1 TABLET DAILY 90 tablet 3   • ASPIRIN ADULT LOW DOSE 81 MG EC tablet Take 1 tablet by mouth Daily. 90 tablet 0   • Brilinta 90 MG tablet tablet TAKE 1 TABLET EVERY 12 HOURS 180 tablet 3   • clotrimazole-betamethasone (LOTRISONE) 1-0.05 % cream APPLY TOPICALLY TO THE APPROPRIATE AREA AS DIRECTED EVERY 12 HOURS 60 g 11   • cyclobenzaprine (FLEXERIL) " 10 MG tablet 1 to 1/2 pill p.o. every 8 hours as needed 30 tablet 0   • ezetimibe (ZETIA) 10 MG tablet TAKE 1 TABLET DAILY 90 tablet 3   • fluticasone (FLONASE) 50 MCG/ACT nasal spray 2 sprays into the nostril(s) as directed by provider Daily. 3 g 0   • Fluticasone Furoate-Vilanterol (Breo Ellipta) 100-25 MCG/INH inhaler INHALE ONE DOSE BY MOUTH DAILY. HOLD FOR 5 SECONDS IF POSSIBLE . RINSE MOUTH AFTERWARD     • furosemide (LASIX) 20 MG tablet TAKE 3 TABLETS DAILY 270 tablet 3   • metoprolol tartrate (LOPRESSOR) 50 MG tablet TAKE 1 TABLET TWICE A  tablet 3   • modafinil (PROVIGIL) 200 MG tablet Take 1 tablet by mouth 2 (Two) Times a Day. 180 tablet 0   • montelukast (SINGULAIR) 10 MG tablet Take 10 mg by mouth Daily.     • NIFEdipine XL (PROCARDIA XL) 90 MG 24 hr tablet Take 1 tablet by mouth Daily. 90 tablet 1   • nystatin (MYCOSTATIN) 720879 UNIT/ML suspension      • omeprazole (priLOSEC) 40 MG capsule Take 1 capsule by mouth Daily. 90 capsule 0   • spironolactone (ALDACTONE) 25 MG tablet TAKE 1 TABLET DAILY 90 tablet 3   • traMADol (ULTRAM) 50 MG tablet TAKE TWO TABLETS BY MOUTH EVERY 6 HOURS AS NEEDED FOR PAIN 60 tablet 0   • venlafaxine XR (EFFEXOR-XR) 75 MG 24 hr capsule 2 tabs qd 180 capsule 3   • zolpidem (AMBIEN) 10 MG tablet TAKE 1 TABLET AT NIGHT AS NEEDED FOR SLEEP (NEED APPOINTMENT FOR FUTURE REFILLS) 30 tablet 0     No current facility-administered medications for this visit.     Assessment:       Diagnosis Plan   1. Coronary artery disease involving native coronary artery of native heart without angina pectoris     2. PAH (pulmonary arterial hypertension) with portal hypertension (CMS/HCC)     3. Sleep apnea, unspecified type     4. Essential hypertension          No orders of the defined types were placed in this encounter.        Plan:       1.  65-year-old gentleman with coronary artery disease status post distal left circumflex stenting 2018, preserved left ventricular systolic function.   "He had a 50% mid to distal circumflex, 60-70% ostial marginal stenosis and 40-50% LAD stenosis at that time.  Normal perfusion stress test 5/2020  -Stable no real angina continue the same  -He will transition to aspirin monotherapy once he completes Brilinta.  I did discuss with him clopidogrel as an option for monotherapy and he wants to look that up  2.  Hypertension blood pressure has been borderline elevated I am going to increase spironolactone and arrange for repeat labs in 2 weeks  3.  Hyperlipidemia on ezetimibe  4.  Morbid obesity BMI 46.60.  He would benefit from diet modification, smaller portion sizes and increase physical activity with goal of weight loss  5.  C2-C5 cervical stenosis  6.  Pulmonary hypertension he follows with Dr. Alex Maciel at [facility]  7. DIAZ treated with Bipap reports compliance  8. Premature ventricular contractions chronic, minimally symptomatic but no complex ventricular ectopy and stable  9.  Enlarged prostate with urinary issues involving hesitancy, sensation of feeling incomplete and \"sprinkling\" of his urine flow-we will refer back to Dr. Lopez    We will follow-up on his blood pressure via telephone in 2 weeks once labs obtained.           It has been a pleasure to participate in this patient's care.      Thank you,  VERÓNICA Castañeda      **I used Dragon to dictate this note:**  "

## 2021-06-16 NOTE — TELEPHONE ENCOUNTER
Caller: Maik Lara    Relationship: Self    Best call back number: [phone] (M)    What medication are you requesting: ANTIBIOTIC FOR SINUSITIS    What are your current symptoms: DARK YELLOW  MUCUS    How long have you been experiencing symptoms: 2 DAYS    Have you had these symptoms before:    [x] Yes  [] No    Have you been treated for these symptoms before:   [x] Yes  [] No    If a prescription is needed, what is your preferred pharmacy and phone number: [facility] [address] AT [facility] & (FRANCIS ) - [phone] [facility] [phone] FX     Additional notes: PATIENT IS A BIPAP PATIENT AND IS REQUESTING THAT AN ANTIBIOTIC BE CALLED IN TO HIS PHARMACY FOR SINUSITIS, PLEASE ADVISE PATIENT WHEN SENT TO PHARMACY

## 2021-06-17 NOTE — PROGRESS NOTES
Chief Complaint  Sinusitis    Subjective          Maik Lara presents to [facility] PRIMARY CARE  History of Present Illness  You have chosen to receive care through a telephone visit. Do you consent to use a telephone visit for your medical care today? Yes  Time spent during, 10 minutes.   C/o sinus pressure, drainage, using bipap, states drainage yellow, trouble sleeping due to congestion. He denies fever. He is using flonase, proair inhaler, singular. He denies ear pain, does have sore throat at times. He tried salt water gargles, he does have cough at times, he did have covid 19 vaccines.         Objective   Vital Signs:   There were no vitals taken for this visit.    Physical Exam  Pulmonary:      Effort: Pulmonary effort is normal.   Neurological:      Mental Status: He is oriented to person, place, and time.     physical exam limited d/t telephone visit.        Result Review :                 Assessment and Plan    Diagnoses and all orders for this visit:    1. Acute maxillary sinusitis, recurrence not specified (Primary)    2. Allergic rhinitis, unspecified seasonality, unspecified trigger    Other orders  -     Discontinue: amoxicillin (AMOXIL) 875 MG tablet; Take 1 tablet by mouth 2 (Two) Times a Day for 7 days.  Dispense: 14 tablet; Refill: 0  -     amoxicillin (AMOXIL) 875 MG tablet; Take 1 tablet by mouth 2 (Two) Times a Day for 10 days.  Dispense: 20 tablet; Refill: 0        Follow Up   Return if symptoms worsen or fail to improve.  Patient was given instructions and counseling regarding his condition or for health maintenance advice. Please see specific information pulled into the AVS if appropriate.     Start amoxicillin 875mg bid for 10 days.   Cont flonase, singular, bipap.   If symptoms persist 5-7 days call office.   Increase fluid intake, get plenty of rest.   Patient agrees with plan of care and understands instructions. Call if worsening symptoms or any problems or concerns.

## 2021-06-17 NOTE — PATIENT INSTRUCTIONS
Start amoxicillin 875mg bid for 10 days.   Cont flonase, singular, bipap.   If symptoms persist 5-7 days call office.   Increase fluid intake, get plenty of rest.   Patient agrees with plan of care and understands instructions. Call if worsening symptoms or any problems or concerns.
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
## 2021-07-27 NOTE — TELEPHONE ENCOUNTER
Caller: Maik Lara    Relationship: Self    Best call back [phone] :   What is the best time to reach you:ASAP    Who are you requesting to speak with (clinical staff, provider,  specific staff member): CLINICAL    Do you know the name of the person who called:  MAIK     What was the call regarding: PATIENT HAS A VERY INFECTED TOOTH AND IS CONCERNED ABOUT IT GETTING INTO HIS HEART. HE IS GOING TO ORAL SURGEON TOMORROW BUT IT IS INFECTED AND HE IS IN A GREAT AMOUNT OF PAIN. HE IS NEEDING A CALL BACK .     Do you require a callback: YES

## 2021-07-27 NOTE — TELEPHONE ENCOUNTER
Pt has appt with oral surgeon tomorrow. They told him he needs to be on antibiotic for his tooth infection and get from his dentist but his dentist is on vacation. Wants to know if you can call in antibiotic

## 2021-08-02 NOTE — PROGRESS NOTES
Physical Therapy Daily Progress Note    Patient: Maik Lara   : 1955  Diagnosis/ICD-10 Code:  Leg weakness, bilateral [R29.898]  Referring practitioner: ANITHA Scherer  Date of Initial Visit: Type: THERAPY  Noted: 2021  Today's Date: 2021  Patient seen for 2 sessions             Subjective Evaluation    History of Present Illness    Subjective comment: I know I'm late, I got hung up in the locker room.   Pain about 6/10 in lower back.       Objective     AQUATIC EX:     Water Walk  Forward x 3 laps  HS stretch  -  Wall stretch  30 sec x 2  Noodle ab pushdowns LN x 15  Hip abd  12x  March in Place 15x  Mini squats  12x  Heel raises  12x  Bicycle   Seated x 2 min  Flutter/Scissor kicks - / 15, seated       Assessment & Plan     Assessment  Assessment details: Patient arrived to pool area at 11:29am for his 11:00am appt.  He was seen today for abbreviated initial aquatic therapy session including education and instruction in basic aquatic ex/activity for mobility, flexibility, and strength/stabilization.  He noted some pain with hip abd and scissor kicks so encouraged to reduce ROM which helped decrease pain.  PT provided demonstration and cuing throughout session for optimal posture, core/glut activation, and correct form/technique with ex/activity.  Patient observed to linger in pool and hot pool doing other activity on his own for ~ 20-25 min after his appt. ended.    Plan:  Assess response to initial aquatic session and modify/progress as appropriate.           Progress per Plan of Care           Timed:  Aquatic Therapy    15     mins [id number];    Chantel Douglass, PT  Physical Therapist

## 2021-08-02 NOTE — PROGRESS NOTES
Physical Therapy Daily Progress Note    Patient: Maik Lara   : 1955  Diagnosis/ICD-10 Code:  No primary diagnosis found.  Referring practitioner: ANITHA Scherer  Date of Initial Visit: No linked episodes  Today's Date: 2021  Patient seen for Visit count could not be calculated. Make sure you are using a visit which is associated with an episode. sessions             Subjective     Objective     Assessment & Plan                  Timed:  Aquatic Therapy    ***     mins 53393;    Chantel Douglass, PT  Physical Therapist

## 2021-08-09 NOTE — PROGRESS NOTES
Physical Therapy Daily Progress Note    Patient: Maik Lara   : 1955  Diagnosis/ICD-10 Code:  Leg weakness, bilateral [R29.898]  Referring practitioner: ANITHA cSherer  Date of Initial Visit: Type: THERAPY  Noted: 2021  Today's Date: 2021  Patient seen for 3 sessions             Subjective Evaluation    History of Present Illness    Subjective comment: Hip/knee pain are worse than back pain.  This morning pain in back 7/10, L knee pain 9/10.  Reports he had increased pain/soreness everywhere for about 2 days after first therapy session.  Traveled to [place] for a class reunion this past weekend.         Objective     AQUATIC EX:      Water Walk                 Forward, sideways, backwards x 3 laps ea  HS stretch                   30 sec x 2  Hip sweeps  12x, LN under flexed knee   Wall stretch                 30 sec x 2  Decompression  2 min x 2 at rail  Noodle ab pushdowns            LN x 15  Hip abd                        15x  March in Place            15x  Mini squats                  15x  Heel raises                  15x  Bicycle                         Seated x 2 min  Flutter/Scissor kicks    - / 15, seated     Assessment & Plan     Assessment  Assessment details: Patient reports increased soreness/discomfort for about 2 days after first aquatic session (but patient was observed to spend more time on his own doing things in pool after therapy session ended than he did when working with PT).  Continued with previous aquatic ex/activity for mobility, flexibility, and strength/stabilization.  Increased reps on some ex and added sideways/backwards walking, hip sweeps, HS stretch, and decompression this visit.  He performed all ex slowly and was instructed to focus on controlled movements in comfortable range.  Cuing provided throughout session for optimal posture, core/glut activation, and correct form/technique with ex/activity.      Plan:  Assess response to progression of aquatic  ex/activity and continue to modify/progress as appropriate.         Progress per Plan of Care           Timed:  Aquatic Therapy    42     mins [id number];    Chantel Douglass, PT  Physical Therapist

## 2021-08-17 NOTE — TELEPHONE ENCOUNTER
PATIENT SAYS HE HURTS FOR DAYS AFTER TREATMENTS, HOBBLES TO GET AROUND. HE IS WANTING TO KNOW IF THEIR IS SOMETHING HE SHOULD BE DOING? DR DID NOT GIVE HIM ANY MEDS TO TAKE DURING THERAPY, HE DOES HAVE SOME MUSCLE RELAXER'S THAT HE HAS TO USE AS NEEDED AND HAS TAKEN A FEW. HE SAYS IT FEELS MORE LIKE A NERVE PAIN.

## 2021-09-02 NOTE — PROGRESS NOTES
Physical Therapy 30-Day / 10-Visit Progress Note         Patient: Maik Lara   : 1955  Diagnosis/ICD-10 Code:  Leg weakness, bilateral [R29.898]  Referring practitioner: ANITHA Scherer  Date of Initial Visit: Type: THERAPY  Noted: 2021  Today's Date: 2021  Patient seen for 4 sessions      Subjective:     Clinical Progress: unchanged  Home Program Compliance: Yes, patient reports trying to do some ex everyday.   Treatment has included:  therapeutic exercise, therapeutic activity, aquatic therapy and patient education with home exercise program     Subjective Evaluation    History of Present Illness    Subjective comment: Hurting this morning, pain 8/10 in LB/L hip/knee.  Most days pain is between 7-9/10.     Objective        Posture:  FF posture, increased kyphosis, wide USAMA     Gait:  antalgic gait pattern, decreased pace, flexed trunk/hips/knees, wide USAMA, and mild lateral sway noted.      B Hip AROM grossly 50% flex/abd, neutral hip extension in standing  B knee AROM flexion grossly 50-60% standing, lacks knee extension B   B ankle AROM DF grossly 50%, PF grossly 65-75%     Functional Outcome Score: LISANDRA: 35/50 or 70% disability (was 34/50 or 68% at eval)    AQUATIC EX:      Water Walk                 Forward, sideways, backwards x 2 laps ea  HS stretch                   30 sec x 2  Hip sweeps                 12x, LN under flexed knee   Wall stretch                 30 sec x 2  Decompression           2 min x 2 at rail  UTR (gentle)  LN x 10 ea  Noodle ab pushdowns            LN x 15  Hip abd                        15x  March in Place            2 x 15  Mini squats                  15x  Heel raises                  15x  Bicycle                         Seated x 2 min  Flutter/Scissor kicks    - / 15, seated    Assessment & Plan     Assessment  Assessment details: Maik Lara is a 65 y.o.M who has attended 3 aquatic therapy sessions since his evaluation on 21 for treatment of chronic leg  weakness.  He has comorbidities/personl factors including spinal stenosis, obesity, soft/bony tissue adaptations to poor posture and dysfunctional movement patterns that may affect his progress in therapy plan of care.  His pain remains about the same rated 8/10 today.  There has not been any notable progress thus far given limited visits to date.  He has met 1 of 3 STGs with remaining STGs and all LTGs ongoing.  He may benefit from continuation of skilled therapy with improved consistency of attendance to help reduce pain, improve function, and facilitate transition to HEP for independent self care.    Goals  Plan Goals: STGs to be met in 4 weeks  1. Maik is introduced to aquatic physical therapy exercises, MET, today is his 3rd aquatic session since evaluation on 7/23/21.  2. He is independent applying aquatic exercises for an ongoing program.  Ongoing  3. LISANDRA is reduced by 10%.  Ongoing    LTGs to be met in 12 weeks  1. Maik reports that peak pain is reduced to 5/10.  Ongoing, reports pain today 8/10 w/ pain typically 7 to 9/10.  2. He is independent with a HEP and education for self care.  Ongoing, only 3 aquatic visits to date  3. LISANDRA is reduced by 20%.  Ongoing           Recommendations: Continue as planned  Timeframe: 1 month  Prognosis to achieve goals: fair    PT Signature: Chantel Douglass, KALPANA      Based upon review of the patient's progress and continued therapy plan, it is my medical opinion that Maik Lara should continue physical therapy treatment at [facility]  [address] [place] [address] [address] [place] [address]  [phone].    Signature: __________________________________  Martín Rushing PA    Timed:  Aquatic therapy  [id number]    40   mins

## 2021-09-13 NOTE — PROGRESS NOTES
"Physical Therapy Daily Progress Note    Patient: Maik Lara   : 1955  Diagnosis/ICD-10 Code:  No primary diagnosis found.  Referring practitioner: ANITHA Scherer  Date of Initial Visit: No linked episodes  Today's Date: 2021  Patient seen for Visit count could not be calculated. Make sure you are using a visit which is associated with an episode. sessions           Subjective: Maik reported that he notices a little improvement with the water but due to large amount of time going without treatment.  He feels his neck is his worst area of pain.      Objective     Observation: Maik arrived for treatment approximately 10 minutes late.     Treatment  1. NuStep seat at 11, x 4 minutes, work load of 5  2. Cleve hip abduction, 45 lbs., x 20  3. Step ups, 4\", x 10, B  4. Lateral step downs, 4\" x 10, B  5. UBE standing, 1 minute forward and 1 minute reverse, work load of 2  6. Manual therapy:  In supine, legs on bolster, cervical spine decompression x 10 minutes  7. Lying CV, craniovertebral, flexion, hook lying, x 20  8. Wand flexion, hook lying, x 10  9. Patient education:  Lying CV flexion and wand flexion were issued to his HEP    Assessment & Plan     Assessment  Assessment details: Maik did not fit well into the NuStep and we may need to find a better machine to get movement out of the legs and torso. He required verbal cues for exercise technique today and tolerated treatment well.     Plan  Plan details: Monitor the effect of today's treatment and continue as indicated.  Consider adding standing shoulder extension and row with theraband next visit.            Timed:    Manual Therapy:    10     mins  17041;  Therapeutic Exercise:    25     mins  75419;     Neuromuscular Elizabeth:    10    mins  21570;    Therapeutic Activity:          mins  95276;     Gait Training:           mins  14419;     Ultrasound:          mins  78619;    Electrical Stimulation:         mins  19006 ( " );  Iontophoresis         mins 19678;  Aquatic Therapy         mins 70720;  Dry Needling                   mins    Untimed:  Electrical Stimulation:         mins  78115 ( );  Mechanical Traction:         mins  58533;     Timed Treatment:   45   mins   Total Treatment:     45   mins  Nghia Abel PT  Physical Therapist

## 2021-09-21 NOTE — PROGRESS NOTES
"Physical Therapy Daily Progress Note    Patient: Maik Lara   : 1955  Diagnosis/ICD-10 Code:  Leg weakness, bilateral [R29.898]  Referring practitioner: ANITHA Scherer  Date of Initial Visit: Type: THERAPY  Noted: 2021  Today's Date: 2021  Patient seen for 6 sessions           Subjective: Maik reported that he tolerated treatment well last visit.     Objective     Treatment  1. Cowdrey hip abduction, 100 lbs., 10 x 2  2. Forward step up, 4\", using column for assist, x 10, B  3. Lateral step down, 4\", using column for assist, x 10, B  4. Standing shoulder extension, green theraband, 10 x 2  5. Standing row, semi-supinated , green theraband, 10 x 2  6. Manual therapy:  In supine, legs on bolster, cervical spine decompression x 10 minutes  7. Lying CV flexion x 20  8. Lying neck rotation, with CV flexion, x 10, B  9. Wand flexion x 10  10. Patient education:  Lying neck rotation, with CV flexion, was added to his HEP    Assessment & Plan     Assessment  Assessment details: The step exercises were moderately difficult and required him to hold onto the column for assist.  He required verbal cues for appropriate exercise performance.     Plan  Plan details: Monitor his response to today's treatment and consider using the Octane to warm his hips and knees up a bit next visit.            Timed:    Manual Therapy:    10     mins  37494;  Therapeutic Exercise:    25     mins  69688;     Neuromuscular Elizabeth:    10    mins  08653;    Therapeutic Activity:          mins  09698;     Gait Training:           mins  79718;     Ultrasound:          mins  53981;    Electrical Stimulation:         mins  09506 ( );  Iontophoresis         mins 71131;  Aquatic Therapy         mins 93253;  Dry Needling                   mins    Untimed:  Electrical Stimulation:         mins  41193 ( );  Mechanical Traction:         mins  44490;     Timed Treatment:   45   mins   Total Treatment:     45   mins  Nghia " Page, PT  Physical Therapist

## 2021-09-23 NOTE — PROGRESS NOTES
"Physical Therapy Daily Progress Note    Patient: Maik Lara   : 1955  Diagnosis/ICD-10 Code:  Leg weakness, bilateral [R29.898]  Referring practitioner: ANITHA Scherer  Date of Initial Visit: Type: THERAPY  Noted: 2021  Today's Date: 2021  Patient seen for 7 sessions           Subjective: Maik reports that he is seeing subtle improvement of ease in ADL function.     Objective     1. Shoulder wall slides x 10  2. UBE standing, 1 1/2 minutes forward and 1 1/2 minutes reverse, 2 units of work force  3. Forward step up, 4\", using column for assist, x 10, B  4. Lateral step down, 4\", using column for assist, x 10, B  5. Standing shoulder extension, green theraband, 10 x 2  6. Standing row, semi-supinated , green theraband, 10 x 2  7. Manual therapy:  In supine, legs on bolster, B hip PROM, sciatic nerve gliding, x 4, B, lumbar spine traction via B leg pull, 60s x 5, followed by cervical spine decompression x 10 minutes  8. Shoulder wall slides were added to his HEP.     Assessment & Plan     Assessment  Assessment details: Maik required verbal cues and demonstration for exercise technique.  His shoulders are tight and it may take time for him to do a full wall slide with the pelvis lean.  He tolerated treatment well.     Plan  Plan details: Continue to work with him to improve function on his feet without exacerbating pain.               Timed:    Manual Therapy:    15     mins  21899;  Therapeutic Exercise:    20     mins  40118;     Neuromuscular Elizabeth:    10    mins  04587;    Therapeutic Activity:          mins  62342;     Gait Training:           mins  38175;     Ultrasound:          mins  50339;    Electrical Stimulation:         mins  83484 ( );  Iontophoresis         mins 52393;  Aquatic Therapy         mins 07563;  Dry Needling                   mins    Untimed:  Electrical Stimulation:         mins  37199 ( );  Mechanical Traction:         mins  28765;     Timed " Treatment:   45   mins   Total Treatment:     45   mins  Nghia Abel, PT  Physical Therapist

## 2021-09-29 NOTE — PROGRESS NOTES
"Physical Therapy Daily Progress Note    Patient: Maik Lara   : 1955  Diagnosis/ICD-10 Code:  Leg weakness, bilateral [R29.898]  Referring practitioner: ANITHA Scherer  Date of Initial Visit: Type: THERAPY  Noted: 2021  Today's Date: 2021  Patient seen for 8 sessions           Subjective: Maik reports that he is becoming more and more aware of his posture and is trying to stand more erect.     Objective     Treatment  1. UBE, standing, 2 minutes forward and then 2 minutes in reverse, with 2 work units.  2. Step ups, 4\", x 10, B, using column for balance  3. Lateral step downs, 4\", x 10, B, using column for balance  4. Standing shoulder shoulder extension, neutral , green theraband, 10 x 2  5. Standing row, supinated , green theraband, 10 x 2  6. Shoulder wall slides, x 10  7. 1/12 of a mile ambulation around the track  8. Manual therapy:  In supine with legs on bolster, cervical spine decompression, intermittently, 45s hold, 10s rest, x 10.   9..  Patient education:  I asked Maik to do his mat exercises at home today, later.  I reiterated that need for those to be done daily.  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        Assessment & Plan     Assessment  Assessment details: Maik required verbal cues and demonstration for exercise technique.  He has some difficulty with the wall slides for his shoulders due to gripping the towel and that required specific attention.  He tolerated treatment well, but required two seated resting breaks, approximately 1-2 minutes in length, to recover.     Plan  Plan details: Monitor the effect of today's treatment and continue to progress as he can tolerate it.  Instruction with pursed lip breathing may be helpful.            Timed:    Manual Therapy:    10     mins  57790;  Therapeutic Exercise:    25     mins  " 08495;     Neuromuscular Elizabeth:    10    mins  48247;    Therapeutic Activity:          mins  53375;     Gait Training:           mins  33838;     Ultrasound:          mins  24194;    Electrical Stimulation:         mins  54510 ( );  Iontophoresis         mins 31305;  Aquatic Therapy         mins 85250;  Dry Needling                   mins    Untimed:  Electrical Stimulation:         mins  62319 ( );  Mechanical Traction:         mins  16464;     Timed Treatment:   45   mins   Total Treatment:     45   mins  Nghia Abel PT  Physical Therapist

## 2021-10-04 NOTE — PROGRESS NOTES
"Physical Therapy Daily Progress Note    Patient: Maik Lara   : 1955  Diagnosis/ICD-10 Code:  Leg weakness, bilateral [R29.898]  Referring practitioner: ANITHA Scherer  Date of Initial Visit: Type: THERAPY  Noted: 2021  Today's Date: 10/4/2021  Patient seen for 9 sessions           Subjective:  Maik reported that he is noticing pain in the low back area today.     Objective     Treatment  1. UBE, standing, 2 minutes forward and then 2 minutes in reverse, with 2 work units.  2. Step ups, 4\", x 10, B, using column for balance  3. Lateral step downs, 4\", x 10, B, using column for balance  4. Standing shoulder shoulder extension, neutral , blue theraband, 10 x 2  5. Standing row, supinated , blue theraband, 10 x 2  6. Shoulder wall slides, x 10  7. 1/12 of a mile ambulation around the track  8. Manual therapy:  In supine with legs on bolster, cervical spine decompression, intermittently, 45s hold, 10s rest, x 10.     Assessment & Plan     Assessment  Assessment details: Maik tolerated treatment well. He progressed resistance with the theraband exercises and required verbal cues for exercise technique.     Plan  Plan details: Monitor the effect of today's treatment and continue as indicated.            Timed:    Manual Therapy:    10     mins  71101;  Therapeutic Exercise:    25     mins  51564;     Neuromuscular Elizabeth:    10    mins  74727;    Therapeutic Activity:          mins  31316;     Gait Training:           mins  96647;     Ultrasound:          mins  23060;    Electrical Stimulation:         mins  60391 ( );  Iontophoresis         mins 59660;  Aquatic Therapy         mins 54548;  Dry Needling                   mins    Untimed:  Electrical Stimulation:         mins  62625 ( );  Mechanical Traction:         mins  10207;     Timed Treatment:   45   mins   Total Treatment:     45   mins  Nghia Abel PT  Physical Therapist  "

## 2021-10-13 NOTE — PROGRESS NOTES
"Physical Therapy Daily Progress/30 Day Reassessment Note    Patient: Maik Lara   : 1955  Diagnosis/ICD-10 Code:  Leg weakness, bilateral [R29.898]  Referring practitioner: ANITHA Scherer  Date of Initial Visit: Type: THERAPY  Noted: 2021  Today's Date: 10/13/2021  Patient seen for 10 sessions           Subjective: Maik reports that peak pain is now 6-7/10.  He is tolerating normal ADL activity with less pain.  Showering, he is able to wash the bottom of his feet easier, than before therapy started.     Objective     Treatment  1. UBE, standing 2 minutes forward and 2 minutes reverse, 2 units  2. Step up, 4\", x 10, B  3. Standing pull down, 25 lbs., 10 x 2    Reassessment    FOS: LISANDRA,  31/50=62% disability    Hip AROM: standing  1. Flexion: 90 degrees B  2. Abduction: L 48 degrees and R 40 degrees  3. Adduction  4. Extension: B 10 to 15 degrees B    Ankle AROM, sitting, dorsiflexion was mildly reduced    Assessment & Plan     Assessment  Impairments: abnormal or restricted ROM, activity intolerance, impaired balance, impaired physical strength, lacks appropriate home exercise program, pain with function and weight-bearing intolerance  Assessment details: Maik Lara has been seen for 10 physical therapy sessions for B leg weakness and generalized pain with ADL performance.  Treatment has included therapeutic exercise, manual therapy, neuro-muscular retraining , aquatic therapy and patient education with home exercise program . Progress to physical therapy goals is good.  He will benefit from continued skilled physical therapy to address remaining impairments and functional limitations.   Functional Limitations: carrying objects, lifting, pulling, pushing, uncomfortable because of pain, standing, stooping and unable to perform repetitive tasks  Goals  Plan Goals: STGs to be met in 4 weeks  1. Maik is introduced to aquatic physical therapy exercises. (met)  2. He is independent applying aquatic " exercises for an ongoing program. (met)  3. LISANDRA is reduced by 10% (met)    LTGs to be met in 12 weeks  1. Maik reports that peak pain is reduced to 5/10. (ongoing)  2. He is independent with a HEP and education for self care. (ongoing)  3. LISANDRA is reduced by 20%. (ongoing)    Plan  Therapy options: will be seen for skilled physical therapy services  Planned modality interventions: ultrasound and high voltage pulsed current (pain management)  Planned therapy interventions: manual therapy, abdominal trunk stabilization, ADL retraining, balance/weight-bearing training, body mechanics training, flexibility, functional ROM exercises, gait training, home exercise program, stretching, strengthening, soft tissue mobilization, therapeutic activities, transfer training, postural training and neuromuscular re-education  Other planned therapy interventions: aquatic therapy  Frequency: 1-2 x per week.  Duration in weeks: 8  Treatment plan discussed with: patient               Timed:    Manual Therapy:         mins  84152;  Therapeutic Exercise:    35     mins  35902;     Neuromuscular Elizabeth:    10    mins  95309;    Therapeutic Activity:          mins  06740;     Gait Training:           mins  51997;     Ultrasound:          mins  33000;    Electrical Stimulation:         mins  14467 ( );  Iontophoresis         mins 78847;  Aquatic Therapy         mins 56645;  Dry Needling                   mins    Untimed:  Electrical Stimulation:         mins  71559 (MC );  Mechanical Traction:         mins  55746;     Timed Treatment:   45   mins   Total Treatment:     45   mins  Nghia Abel, PT  Physical Therapist

## 2021-10-29 NOTE — TELEPHONE ENCOUNTER
Caller: Maik Lara    Relationship: Self    Requested Prescriptions:   Requested Prescriptions     Pending Prescriptions Disp Refills   • clotrimazole-betamethasone (LOTRISONE) 1-0.05 % cream 60 g 11        Pharmacy where request should be sent: [facility] - [address] - [phone]  - [phone] [phone]    Additional details provided by patient: PATIENT HAS A 10 DAY SUPPLY    Best call back number: [phone]    Does the patient have less than a 3 day supply:  [x] Yes  [] No    Comfort Jhaveri Rep   10/29/21 16:18 EDT

## 2021-10-29 NOTE — TELEPHONE ENCOUNTER
Caller: Maik Lara    Relationship: Self    Best call back number: [phone]    What is the medical concern/diagnosis: PROSTATE CONCERNS    What specialty or service is being requested: UROLOGY    Any additional details: PATIENT STATES HE DOES NOT LIKE GOING TO SEE DR. NICANOR QUICK ON [place] AS IT IS A CLINIC AND HE HAS TO WAIT FOR 2 HOURS AT A TIME. PATIENT WANTING TO KNOW IF THERE IS ANYONE ELSE ANGELA CAN REFER HIM TO THAT IS AN ACTUAL OFFICE OR HOSPITAL SO HE CAN BE SEEN WITH LESS WAIT TIME.

## 2021-11-02 NOTE — PROGRESS NOTES
"Physical Therapy Daily Progress Note    Patient: Maik Lara   : 1955  Diagnosis/ICD-10 Code:  Leg weakness, bilateral [R29.898]  Referring practitioner: ANITHA Scherer  Date of Initial Visit: Type: THERAPY  Noted: 2021  Today's Date: 2021  Patient seen for 11 sessions           Subjective:  Maik reports that approximately one week ago he feel asleep at a breakfast bar at home and fell off the stool with the bar coming down on him.  Peak pain has been 9/10 at its worst and occurs in the R hip and both knees.      Objective     Treatment  1. UBE, standing, 1 minute forward and 1 minute reverse, 2 work units  2. NuStep, seat at 14, work load of 5 x 4 minutes  3. Runner's step, 4\", x 10, B, column for balance  4. Lateral step down, 4\", x 10, B, column for balance  5. Pull downs, 22.5 lbs., 10 x 2, pronated   6. Standing row, 22.5 lbs., 10 x 2, neutral   7. In supine, legs on bolster, intermittent manual cervical spine decompression, with CP on L knee x 10 minutes    Assessment & Plan     Assessment  Assessment details: Maik tolerated treatment well.  His fall aggravated his knees and R hip.  He required verbal cues for exercise technique and using the core musculature with closed chain exercises.    Plan  Plan details: Monitor his response to today's treatment and continue as indicated.           Timed:    Manual Therapy:    10     mins  02759;  Therapeutic Exercise:    25     mins  02071;     Neuromuscular Elizabeth:    10    mins  87442;    Therapeutic Activity:          mins  03449;     Gait Training:           mins  25755;     Ultrasound:          mins  37091;    Electrical Stimulation:         mins  42388 ( );  Iontophoresis         mins 24940;  Aquatic Therapy         mins 25790;  Dry Needling                   mins    Untimed:  Electrical Stimulation:         mins  64446 ( );  Mechanical Traction:         mins  50545;     Timed Treatment:   45   mins   Total Treatment:  "    45   mins  Nghia Abel, PT  Physical Therapist

## 2021-11-12 NOTE — PROGRESS NOTES
Physical Therapy Daily Progress/30 Day Reassessment Note    Patient: Maik Lara   : 1955  Diagnosis/ICD-10 Code:  Leg weakness, bilateral [R29.898]  Referring practitioner: ANITHA Scherer  Date of Initial Visit: Type: THERAPY  Noted: 2021  Today's Date: 2021  Patient seen for 13 sessions           Subjective:  Maik reported that he has pain in the R proximal gluteal area which has been helped in the past with stretching.  He reported that he is noticing a sensation of one leg being longer than the other with weight bearing.     Objective     Treatment  1. UBE, standing, 2 minutes, forward and 2 minutes reverse  2. Standing hip abduction, green theraband, x 15 B  3. Standing shoulder extension, neutral ,green theraband, 10 x 2  4. Standing row, neutral , green theraband, 10 x 2  5. SKTC, using belt for assist, x 10, B    Reassessment  1. FOS: LISANDRA, 33/50=66% disability  2. Inner quadrant test of the hips, B abnormal  3. Gait:  Antalgic pattern, kyphotic posture, decreased heel strike B, asymmetrical stride length    Assessment & Plan     Assessment  Impairments: abnormal gait, abnormal or restricted ROM, activity intolerance, impaired balance, lacks appropriate home exercise program, pain with function and weight-bearing intolerance  Assessment details: Maik Lara has been seen for 13 physical therapy sessions for B leg weakness.  Treatment has included therapeutic exercise, manual therapy, neuro-muscular retraining , aquatic therapy and patient education with home exercise program . Progress to physical therapy goals is fair to good.  He will benefit from continued skilled physical therapy to address remaining impairments and functional limitations.   Prognosis: good  Prognosis details: Maik will need education to learn home exercises for mobility of the kinetic chain joints that are degenerative, posture exercises that and low level strengthening that he can perform regularly.  I  feel the therapy pool is needed further to get him independent with a thorough program to include introducing him to walking in water, which will be much easier on his body and allow him to get moving to improve conditioning.    Functional Limitations: carrying objects, walking, pulling, pushing, uncomfortable because of pain, stooping and unable to perform repetitive tasks  Goals  Plan Goals: STGs to be met in 4 weeks  1. Maik is introduced to aquatic physical therapy exercises. (met)  2. He is independent applying aquatic exercises for an ongoing program. (ongoing)  3. LISANDRA is reduced by 10% (met)    LTGs to be met in 12 weeks  1. Maik reports that peak pain is reduced to 5/10. (ongoing)  2. He is independent with a HEP and education for self care. (ongoing)  3. LISANDRA is reduced by 20%. (ongoing)    Plan  Therapy options: will be seen for skilled physical therapy services  Planned modality interventions: ultrasound and high voltage pulsed current (pain management)  Other planned modality interventions: aqua therapy  Planned therapy interventions: manual therapy, abdominal trunk stabilization, ADL retraining, balance/weight-bearing training, body mechanics training, flexibility, functional ROM exercises, gait training, home exercise program, spinal/joint mobilization, soft tissue mobilization, postural training, neuromuscular re-education, strengthening, stretching, therapeutic activities and transfer training  Frequency: 1x week  Duration in weeks: 8  Treatment plan discussed with: patient  Plan details: Continue with land therapy as tolerated and get him back in the water.             Timed:    Manual Therapy:         mins  59970;  Therapeutic Exercise:    35     mins  57164;     Neuromuscular Elizabeth:    10    mins  11840;    Therapeutic Activity:          mins  93030;     Gait Training:           mins  50210;     Ultrasound:          mins  42738;    Electrical Stimulation:         mins  60575 (  );  Iontophoresis         mins 30636;  Aquatic Therapy         mins 92394;  Dry Needling                   mins    Untimed:  Electrical Stimulation:         mins  83845 ( );  Mechanical Traction:         mins  75763;     Timed Treatment:   45   mins   Total Treatment:     45   mins  Nghia Abel PT  Physical Therapist

## 2021-11-29 NOTE — TELEPHONE ENCOUNTER
Caller: Maik Lara    Relationship: Self    Best call back number: [phone]       CONCERNS:      PATIENT IS EXPERIENCING PAIN IN HIS LEFT EAR AND HAS SINUS DRAINAGE.  SINCE THIS MORNING THE MUCUS HAS CHANGED TO A YELLOWISH LOOKING COLOR.  PATIENT IS ALSO EXPERIENCING PAIN DOWN LEFT SIDE OF HIS THROAT.    PATIENT THINKS THAT HE NEEDS AN ANTIBIOTIC CALLED INTO HIS PHARMACY.    PATIENT IS ALSO A BI-PAP PATIENT AND DOESN'T THINK HE WILL BE ABLE TO SLEEP WITH THIS SITUATION AS IT IS.    [address] [address] [facility] & (FRANCIS ) - [phone] [facility] [phone] FX        PLEASE CALL AND ADVISE PATIENT WHEN MEDICATION HAS BEEN SENT TO THE PHARMACY.

## 2021-12-02 NOTE — PROGRESS NOTES
[facility]      Patient Name: Maik Lara  :1955  Age: 66 y.o.  Encounter Provider:  Margarito Aranda Jr, MD      Chief Complaint:   Chief Complaint   Patient presents with   • Coronary Artery Disease         HPI  Maik Lara is a 66 y.o. male past medical history coronary artery disease, morbid obesity, diastolic heart failure, seasonal allergies who presents for initial evaluation in clinic with me.  I saw the patient in the hospital 2020.  Patient was having chest pain at the time of the stress test was performed which showed no evidence of ischemia.  Patient had recent follow-up with April Caceres and other than mild chronic dyspnea on exertion he was doing fairly well.  Over the last 6 months he has had a progressive worsening of dyspnea on exertion.  He was walking near the home about 2 weeks ago when he had severe shortness of air on walking with chest tightness and diaphoresis.  That is the one and only event he had like that but is noticing progressive exertional dyspnea on less and less activity.  On thorough investigation of his previous anginal equivalent he states that it was very much like the episode he recently had.  He has been relatively sedentary over the past 12 months as he is quite fearful about the Covid issues in the community.  He denies orthopnea or PND.  He has chronic stable lower extremity edema.  No palpitations, dizziness or syncope.  Blood pressure is elevated today in clinic.  He does spot check his blood pressure at home does not check it regularly.  Social and family history reviewed and not pertinent to this clinic visit.      The following portions of the patient's history were reviewed and updated as appropriate: allergies, current medications, past family history, past medical history, past social history, past surgical history and problem list.      Review of Systems   Constitutional: Negative for chills and fever.   HENT: Negative for hoarse  "voice and sore throat.    Eyes: Negative for double vision and photophobia.   Cardiovascular: Positive for chest pain, dyspnea on exertion and leg swelling. Negative for near-syncope, orthopnea, palpitations, paroxysmal nocturnal dyspnea and syncope.   Respiratory: Negative for cough and wheezing.    Skin: Negative for poor wound healing and rash.   Musculoskeletal: Negative for arthritis and joint swelling.   Gastrointestinal: Negative for bloating, abdominal pain, hematemesis and hematochezia.   Neurological: Negative for dizziness and focal weakness.   Psychiatric/Behavioral: Negative for depression and suicidal ideas.       OBJECTIVE:   Vital Signs  Vitals:    12/02/21 1138   BP: 140/80   Pulse: 81     Estimated body mass index is 47.31 kg/m² as calculated from the following:    Height as of this encounter: 180.3 cm (71\").    Weight as of this encounter: 154 kg (339 lb 3.2 oz).    Vitals reviewed.   Constitutional:       Appearance: Healthy appearance. Not in distress.   Neck:      Vascular: No JVR. JVD normal.   Pulmonary:      Effort: Pulmonary effort is normal.      Breath sounds: No wheezing. No rhonchi. Rales present.   Chest:      Chest wall: Not tender to palpatation.   Cardiovascular:      PMI at left midclavicular line. Normal rate. Regular rhythm. Normal S1. Normal S2.      Murmurs: There is no murmur.      No gallop. No click. No rub.   Pulses:     Intact distal pulses.   Edema:     Peripheral edema present.  Abdominal:      General: Bowel sounds are normal.      Palpations: Abdomen is soft.      Tenderness: There is no abdominal tenderness.   Musculoskeletal: Normal range of motion.         General: No tenderness. Skin:     General: Skin is warm and dry.   Neurological:      General: No focal deficit present.      Mental Status: Alert and oriented to person, place and time.         Procedures          ASSESSMENT:     Coronary artery disease with angina  Uncontrolled hypertension  Morbid " obesity  Dyslipidemia      PLAN OF CARE:     1. Coronary artery disease -latest episode with progressive exertional dyspnea is concerning for unstable angina.  Will move forward with cardiac catheterization.  Continue aspirin, beta-blocker and Zetia.  Uncertain why the patient is not on a statin at this time and he keeps meticulous records with no previous allergy listed.  We will start atorvastatin 10 mg.  Repeat lipid profile, CMP and CK in 8 weeks.  2. Uncontrolled hypertension = check twice daily blood pressure log.  Sodium restricted diet.  Weight reduction strategies.  Consider sleep study.  3. Morbid obesity -as above  4. Dyslipidemia -continue Zetia, adding statin.    Return to clinic 3 months with April Caceres.             Discharge Medications          Accurate as of December 2, 2021 11:38 AM. If you have any questions, ask your nurse or doctor.            Continue These Medications      Instructions Start Date   acetaminophen 650 MG 8 hr tablet  Commonly known as: TYLENOL   1,000 mg, Oral      albuterol 108 (90 Base) MCG/ACT inhaler  Commonly known as: PROAIR RESPICLICK   2 puffs, Inhalation, As Needed      allopurinol 300 MG tablet  Commonly known as: ZYLOPRIM   300 mg, Oral, Daily      amoxicillin 500 MG capsule  Commonly known as: AMOXIL   1,000 mg, Oral, 2 Times Daily      Aspirin Adult Low Dose 81 MG EC tablet  Generic drug: aspirin   81 mg, Oral, Daily      Breo Ellipta 100-25 MCG/INH inhaler  Generic drug: Fluticasone Furoate-Vilanterol   INHALE ONE DOSE BY MOUTH DAILY. HOLD FOR 5 SECONDS IF POSSIBLE . RINSE MOUTH AFTERWARD      citalopram 40 MG tablet  Commonly known as: CeleXA   40 mg, Oral, Daily      clotrimazole-betamethasone 1-0.05 % cream  Commonly known as: LOTRISONE   Topical, 2 Times Daily      cyclobenzaprine 10 MG tablet  Commonly known as: FLEXERIL   TAKE 1/2 TO 1 TABLET BY MOUTH EVERY 8 HOURS AS NEEDED      ezetimibe 10 MG tablet  Commonly known as: ZETIA   10 mg, Oral, Daily       fluticasone 50 MCG/ACT nasal spray  Commonly known as: FLONASE   USE 2 SPRAYS IN EACH NOSTRIL AS DIRECTED BY PROVIDER DAILY      furosemide 20 MG tablet  Commonly known as: LASIX   TAKE 3 TABLETS DAILY      metoprolol tartrate 50 MG tablet  Commonly known as: LOPRESSOR   TAKE 1 TABLET TWICE A DAY      modafinil 200 MG tablet  Commonly known as: PROVIGIL   200 mg, Oral, 2 Times Daily      montelukast 10 MG tablet  Commonly known as: SINGULAIR   10 mg, Oral, Daily      NIFEdipine XL 90 MG 24 hr tablet  Commonly known as: PROCARDIA XL   90 mg, Oral, Daily      nystatin 100,000 unit/mL suspension  Commonly known as: MYCOSTATIN   As Needed      omeprazole 40 MG capsule  Commonly known as: priLOSEC   40 mg, Oral, Daily      spironolactone 25 MG tablet  Commonly known as: ALDACTONE   50 mg, Oral, Daily      traMADol 50 MG tablet  Commonly known as: ULTRAM   TAKE TWO TABLETS BY MOUTH EVERY 6 HOURS AS NEEDED FOR PAIN      venlafaxine XR 75 MG 24 hr capsule  Commonly known as: EFFEXOR-XR   2 tabs qd      zolpidem 10 MG tablet  Commonly known as: AMBIEN   TAKE 1 TABLET AT NIGHT AS NEEDED FOR SLEEP (NEED APPOINTMENT FOR FUTURE REFILLS)             Thank you for allowing me to participate in the care of your patient,      Sincerely,   Margarito Aranda MD  [facility]  12/02/21  11:38 EST

## 2021-12-09 NOTE — TELEPHONE ENCOUNTER
"PATIENT STATED THAT HE HAS ABOUT 11 DAYS LEFT AND WANTED TO MAKE SURE THIS CAN BE REFILLED BEFORE HE RUNS OUT.     THIS ALSO ALLOWS THE DELIVERY TIME FOR MAIL ORDER    CAN IT BE CALLED IN AND PUT ON \"HOLD\" WITH THE PHARMACY?     OR CAN YOU CALL IT IN CLOSER TO DUE DATE?   PLEASE ADVISE  Maik Lara (Self) [phone] (M)       "

## 2021-12-10 NOTE — TELEPHONE ENCOUNTER
Caller: Maik Lara    Relationship: Self    Best call back number: [phone]    Requested Prescriptions:   Requested Prescriptions     Pending Prescriptions Disp Refills   • zolpidem (AMBIEN) 10 MG tablet 30 tablet 0        Pharmacy where request should be sent: [facility] - [address] [phone] [facility] [phone] FX     Additional details provided by patient: PATIENT CALLING IN REGARDS TO REQUEST A REFILL. PLEASE ADVISE THANK YOU!    Does the patient have less than a 3 day supply:  [] Yes  [x] No    Comfort Hollingsworth Rep   12/10/21 10:48 EST

## 2021-12-16 NOTE — TELEPHONE ENCOUNTER
He needs to come in for lab appt to update UDS.can just come for lab and keep rodrigo appt. Lab is no charge?

## 2021-12-16 NOTE — TELEPHONE ENCOUNTER
Pt is calling because he has 2 tablets left on his zolpidem, and he says he doesn't understand why he needs to be seen before he can receive his full refill on that medication.  Pt said he sees his respiratory specialist and that should be sufficient to have his family provider continue prescribing a controlled substance for him.  Pt was also informed of the next available appt date which he did not take because it is in January.

## 2021-12-16 NOTE — TELEPHONE ENCOUNTER
Patient informed he said made an appointment for January wants to know if you will fill his zolpidem until appointment?

## 2021-12-16 NOTE — TELEPHONE ENCOUNTER
Caller: Maik Lara    Relationship: Self    Best call back number: [phone]     Requested Prescriptions:   zolpidem (AMBIEN) 10 MG tablet       Pharmacy where request should be sent:    [facility] - [place], [place] - [address] [place] [address] - [phone]  - [phone]   [phone]    Additional details provided by patient: PATIENT IS CALLING TO STATE HE HAS ONLY 2 TABLETS REMAINING.  HE STATES HIS PREVIOUS PCP DID NOT REQUIRE HIM TO COME IN OTHER THAN FOR LABS.  HE SCHEDULED A MED REFILL FOLLOW UP ON 01/06/22.  HE IS UPSET BECAUSE HE WILL HAVE TO COME IN AND PAY OVER $300 TO SEE MS CRAFT.      Does the patient have less than a 3 day supply:  [x] Yes  [] No    Cassie Archer, RegSched Rep   12/16/21 09:39 EST     PLEASE ADVISE.

## 2021-12-16 NOTE — TELEPHONE ENCOUNTER
Ambien is a controlled prescription, must be UTD UDS and contract for refills, must be seen 2x year for refills on controlled rx. Otherwise we cannot prescribe for him.

## 2021-12-17 NOTE — TELEPHONE ENCOUNTER
Will send in 1 week. Can refill after UDS resulted. These are state rules, must be up to date prior to refills. Needs to keep follow up appt.

## 2021-12-17 NOTE — TELEPHONE ENCOUNTER
PATIENT CALLED BACK TO CHECK THE STATUS OF THIS REQUEST    HE DID A URINE TEST TODAY -- BUT THOUGHT THERE WERE LABS TO GO WITH IT ( BLOOD DRAW)       HE WILL NOT HAVE ENOUGH MEDS TO GET HIM THROUGH THE WEEKEND     CAN YOU CALL IN ABOUT 10 DAYS WORTH INTO RETAIL     AND SOME INTO [facility]     PLEASE CALL AND ADVISE    Maik Lara (Self) [phone] (M)

## 2021-12-20 PROBLEM — I20.0 UNSTABLE ANGINA (HCC): Status: ACTIVE | Noted: 2021-01-01

## 2021-12-23 NOTE — TELEPHONE ENCOUNTER
Patient is scheduled 21, please follow instructions for peer to peer.    Thank you    Daphne BUSTOS    Provider: Dr. Bragg  Date Scheduled: 21  Procedure and CPT: [facility] w/ BRENDA, CPT 57630  Patient Name: Maik Lara  Patient : 1955  Patient MRN: [id number]  Insurance Carrier: [facility] Medicare Replacement   Insurance Phone for P2P: [phone] (option #3)  Insurance ID: [id number]  Case number if applicable: [id number]

## 2021-12-23 NOTE — TELEPHONE ENCOUNTER
Caller: Maik Lara    Relationship: Self    Best call back number: [phone]    Requested Prescriptions:   Requested Prescriptions     Pending Prescriptions Disp Refills   • zolpidem (AMBIEN) 10 MG tablet 7 tablet 0     Sig: Take 1 tablet by mouth At Night As Needed for Sleep.        Pharmacy where request should be sent: [facility] - [address] [phone] [facility] [phone]      Additional details provided by patient: PATIENT IS ALMOST OUT    Does the patient have less than a 3 day supply:  [x] Yes  [] No    Comfort Little Rep   12/23/21 16:18 EST

## 2022-01-01 ENCOUNTER — HOSPITAL ENCOUNTER (EMERGENCY)
Age: 67
End: 2022-03-19
Attending: EMERGENCY MEDICINE
Payer: MEDICARE

## 2022-01-01 ENCOUNTER — TELEPHONE (OUTPATIENT)
Dept: FAMILY MEDICINE CLINIC | Facility: CLINIC | Age: 67
End: 2022-01-01

## 2022-01-01 ENCOUNTER — TELEPHONE (OUTPATIENT)
Dept: CARDIOLOGY | Facility: CLINIC | Age: 67
End: 2022-01-01

## 2022-01-01 ENCOUNTER — OFFICE VISIT (OUTPATIENT)
Dept: FAMILY MEDICINE CLINIC | Facility: CLINIC | Age: 67
End: 2022-01-01

## 2022-01-01 ENCOUNTER — OFFICE VISIT (OUTPATIENT)
Dept: CARDIOLOGY | Facility: CLINIC | Age: 67
End: 2022-01-01

## 2022-01-01 VITALS
SYSTOLIC BLOOD PRESSURE: 148 MMHG | BODY MASS INDEX: 44.1 KG/M2 | HEIGHT: 71 IN | WEIGHT: 315 LBS | DIASTOLIC BLOOD PRESSURE: 90 MMHG | HEART RATE: 78 BPM

## 2022-01-01 VITALS
BODY MASS INDEX: 44.1 KG/M2 | DIASTOLIC BLOOD PRESSURE: 80 MMHG | HEART RATE: 72 BPM | SYSTOLIC BLOOD PRESSURE: 130 MMHG | WEIGHT: 315 LBS | TEMPERATURE: 98.7 F | OXYGEN SATURATION: 100 % | HEIGHT: 71 IN

## 2022-01-01 DIAGNOSIS — G47.00 INSOMNIA, UNSPECIFIED TYPE: ICD-10-CM

## 2022-01-01 DIAGNOSIS — I27.21 PAH (PULMONARY ARTERIAL HYPERTENSION) WITH PORTAL HYPERTENSION: ICD-10-CM

## 2022-01-01 DIAGNOSIS — G89.29 CHRONIC PAIN OF BOTH SHOULDERS: ICD-10-CM

## 2022-01-01 DIAGNOSIS — R68.2 DRY MOUTH: ICD-10-CM

## 2022-01-01 DIAGNOSIS — M25.512 CHRONIC PAIN OF BOTH SHOULDERS: ICD-10-CM

## 2022-01-01 DIAGNOSIS — K76.6 PAH (PULMONARY ARTERIAL HYPERTENSION) WITH PORTAL HYPERTENSION: ICD-10-CM

## 2022-01-01 DIAGNOSIS — M25.511 CHRONIC PAIN OF BOTH SHOULDERS: ICD-10-CM

## 2022-01-01 DIAGNOSIS — M54.41 CHRONIC BILATERAL LOW BACK PAIN WITH BILATERAL SCIATICA: ICD-10-CM

## 2022-01-01 DIAGNOSIS — M54.42 CHRONIC BILATERAL LOW BACK PAIN WITH BILATERAL SCIATICA: ICD-10-CM

## 2022-01-01 DIAGNOSIS — I25.10 CORONARY ARTERY DISEASE INVOLVING NATIVE CORONARY ARTERY OF NATIVE HEART WITHOUT ANGINA PECTORIS: Primary | ICD-10-CM

## 2022-01-01 DIAGNOSIS — I10 ESSENTIAL HYPERTENSION: ICD-10-CM

## 2022-01-01 DIAGNOSIS — M51.36 DDD (DEGENERATIVE DISC DISEASE), LUMBAR: Primary | ICD-10-CM

## 2022-01-01 DIAGNOSIS — F51.01 PRIMARY INSOMNIA: ICD-10-CM

## 2022-01-01 DIAGNOSIS — M25.559 HIP PAIN: ICD-10-CM

## 2022-01-01 DIAGNOSIS — G47.30 SLEEP APNEA, UNSPECIFIED TYPE: ICD-10-CM

## 2022-01-01 DIAGNOSIS — G89.29 CHRONIC BILATERAL LOW BACK PAIN WITH BILATERAL SCIATICA: ICD-10-CM

## 2022-01-01 DIAGNOSIS — I10 BENIGN ESSENTIAL HTN: ICD-10-CM

## 2022-01-01 PROCEDURE — 92950 HEART/LUNG RESUSCITATION CPR: CPT

## 2022-01-01 PROCEDURE — 99214 OFFICE O/P EST MOD 30 MIN: CPT | Performed by: NURSE PRACTITIONER

## 2022-01-01 PROCEDURE — 93000 ELECTROCARDIOGRAM COMPLETE: CPT | Performed by: NURSE PRACTITIONER

## 2022-01-01 PROCEDURE — 99283 EMERGENCY DEPT VISIT LOW MDM: CPT

## 2022-01-01 RX ORDER — TRAMADOL HYDROCHLORIDE 50 MG/1
100 TABLET ORAL EVERY 6 HOURS PRN
Qty: 60 TABLET | Refills: 0 | Status: CANCELLED | OUTPATIENT
Start: 2022-01-01

## 2022-01-01 RX ORDER — METHYLPREDNISOLONE 4 MG/1
TABLET ORAL
Qty: 1 EACH | Refills: 0 | Status: SHIPPED | OUTPATIENT
Start: 2022-01-01 | End: 2022-01-01

## 2022-01-01 RX ORDER — METOPROLOL TARTRATE 50 MG/1
50 TABLET, FILM COATED ORAL 2 TIMES DAILY
Qty: 180 TABLET | Refills: 3 | Status: SHIPPED | OUTPATIENT
Start: 2022-01-01

## 2022-01-01 RX ORDER — ZOLPIDEM TARTRATE 10 MG/1
TABLET ORAL
Qty: 30 TABLET | Refills: 0 | Status: SHIPPED | OUTPATIENT
Start: 2022-01-01 | End: 2022-01-01 | Stop reason: SDUPTHER

## 2022-01-01 RX ORDER — ZOLPIDEM TARTRATE 10 MG/1
10 TABLET ORAL NIGHTLY PRN
Qty: 30 TABLET | Refills: 0 | Status: SHIPPED | OUTPATIENT
Start: 2022-01-01

## 2022-01-01 RX ORDER — ALLOPURINOL 300 MG/1
TABLET ORAL
Qty: 90 TABLET | Refills: 3 | Status: SHIPPED | OUTPATIENT
Start: 2022-01-01

## 2022-01-01 RX ORDER — TRAMADOL HYDROCHLORIDE 50 MG/1
50 TABLET ORAL EVERY 8 HOURS PRN
Qty: 20 TABLET | Refills: 0 | Status: SHIPPED | OUTPATIENT
Start: 2022-01-01

## 2022-01-01 RX ORDER — METHYLPREDNISOLONE 4 MG/1
TABLET ORAL
Qty: 21 TABLET | OUTPATIENT
Start: 2022-01-01

## 2022-01-01 RX ORDER — ZOLPIDEM TARTRATE 10 MG/1
TABLET ORAL
Qty: 30 TABLET | Refills: 0 | OUTPATIENT
Start: 2022-01-01

## 2022-01-06 NOTE — PATIENT INSTRUCTIONS
Cont f/u with specialists as scheduled.   He will call to make urology f/u, information given.   The patient has read and signed the [facility] Controlled Substance Contract.  I will continue to see patient for regular follow up appointments.  They are well controlled on their medication.  TAMIE is updated every 3 months. The patient is aware of the potential for addiction and dependence.  Refer to ortho and PM will call with appt. Will refill tramadol x1 as he will be seeing PM, educated about risk, prev tolerated with limited use,   Trial medrol pack take as directed SE explained, call with problems.   Patient agrees with plan of care and understands instructions. Call if worsening symptoms or any problems or concerns.

## 2022-01-06 NOTE — PROGRESS NOTES
"Chief Complaint  Insomnia (medication monitoring)    Subjective          Maik Lara presents to [facility]  History of Present Illness  Here today for f/u, with insomnia, taking ambien 10mg nightly, tolerating well.   With HTN taking metoprolol 50mg daily, nifedipine 90mg daily, spironolactone 25mg daily, lasix 20mg daily.  Prev cardiology Dr. Kammerling, he had recent heart cath with Dr. Bragg 12/27/2021. He states heart cath normal. States he had SOA with exertion. He states he did have SOA yesterday.   With HLD, taking zetia 10mg daily. Tolerating well.    Sees pulm for SOA, ayush, Dr. Maciel, with DIAZ, using cpap. He is using breo inhaler, he plans to reschedule.   With chronic back pain, also shoulders and neck pain, bilat knee and hip pain, he prev saw ortho about 2 years ago and had injection, taking tramadol as needed, last refill 2018, also has flexeril as needed. He takes only as needed. He states he takes for pain, hx of spinal stenosis. Sees ortho Dr. Rushing, did PT which ended about 2 months ago. He states pain now worse. He does not see PM.   C/o dry mouth. Using inhalers.       Objective   Vital Signs:   /80 (BP Location: Left arm, Patient Position: Sitting, Cuff Size: Large Adult)   Pulse 72   Temp 98.7 °F (37.1 °C) (Infrared)   Ht 180.3 cm (70.98\")   Wt (!) 154 kg (340 lb)   SpO2 100%   BMI 47.44 kg/m²     Physical Exam  Vitals and nursing note reviewed.   Constitutional:       Appearance: He is well-developed.   HENT:      Head: Normocephalic.   Eyes:      Pupils: Pupils are equal, round, and reactive to light.   Cardiovascular:      Rate and Rhythm: Normal rate and regular rhythm.      Pulses: Normal pulses.      Heart sounds: Normal heart sounds.   Pulmonary:      Effort: Pulmonary effort is normal.      Breath sounds: Normal breath sounds.   Musculoskeletal:      Lumbar back: No tenderness. Normal range of motion.      Right hip: Normal range of " motion. Normal strength.      Left hip: Normal range of motion. Normal strength.   Skin:     General: Skin is warm and dry.   Neurological:      Mental Status: He is alert and oriented to person, place, and time.   Psychiatric:         Behavior: Behavior normal.         Judgment: Judgment normal.        Result Review :                 Assessment and Plan    Diagnoses and all orders for this visit:    1. DDD (degenerative disc disease), lumbar (Primary)  -     Ambulatory Referral to Pain Management    2. Chronic bilateral low back pain with bilateral sciatica  -     Ambulatory Referral to Pain Management    3. Chronic pain of both shoulders  -     Ambulatory Referral to Orthopedic Surgery    4. Hip pain  -     Ambulatory Referral to Orthopedic Surgery    5. Benign essential HTN    6. Sleep apnea, unspecified type    7. Primary insomnia    8. Dry mouth  -     KOH Prep - Swab, Tongue    Other orders  -     methylPREDNISolone (MEDROL) 4 MG dose pack; Take as directed on package instructions.  Dispense: 1 each; Refill: 0        Follow Up   Return if symptoms worsen or fail to improve, for Medicare Wellness.  Patient was given instructions and counseling regarding his condition or for health maintenance advice. Please see specific information pulled into the AVS if appropriate.   Cont f/u with specialists as scheduled.   He will call to make urology f/u, information given.   The patient has read and signed the [facility] Controlled Substance Contract.  I will continue to see patient for regular follow up appointments.  They are well controlled on their medication.  TAMIE is updated every 3 months. The patient is aware of the potential for addiction and dependence.  Refer to ortho and PM will call with appt. Will refill tramadol x1 as he will be seeing PM, educated about risk, prev tolerated with limited use,   Trial medrol pack take as directed SE explained, call with problems.   Patient agrees with plan of care and  understands instructions. Call if worsening symptoms or any problems or concerns.

## 2022-02-09 NOTE — PROGRESS NOTES
Date of Office Visit: 22  Encounter Provider: VERÓNICA Castañeda  Place of Service: [facility] CARDIOLOGY  Patient Name: Maik Lara  :1955    Chief Complaint   Patient presents with   • Coronary artery disease involving native coronary artery of    • Sleep Apnea   • Hypertension   • Dyslipidemia   • Follow-up   :     HPI: Maik Lara is a 66 y.o. male  with coronary artery disease status post drug-eluting stent to the circumflex, cervical stenosis, hypertension, hyperlipidemia and morbid obesity.     He is followed by Dr. Aranda.  I will visit with him in follow up today and have reviewed his medical record.  He had drug-eluting stent to the left circumflex in 2018.He had some acute kidney injury and also reported some arm pain he was found to have moderate stenosis at C2-C5 on C-spine.  He had an echocardiogram which showed normal left ventricular systolic function.  It was a technically difficult exam but mild mitral regurgitation noted  He later complained of atypical chest pain had a negative perfusion stress test 2020.  He reported progressive dyspnea on exertion in 2021 with chest tightness and diaphoresis.  He had cardiac catheterization which revealed patent left circumflex stent and mild nonobstructive coronary artery disease.  He presents now for reassessment.  He has plans to get back into the [facility] where he has a membership.  He has been checking his blood pressure at home and showed me a list which has most values upper 120s-mid 130s over 70-80.  He does have some occasional values 140-150 but those do not sustain.  He continues to have issues with spinal stenosis.  His pinky finger and ring finger on the left hand has numbness and tingling.  He also has some discomfort in his left shoulder on occasion and although this occurs with exertion he now relates it to his spinal stenosis.  Previously was recommended that he could have surgical  "intervention but he was not convinced he wanted that yet.  He wears BiPAP routinely.  He has no new swelling issues.                 Allergies   Allergen Reactions   • Amlodipine Besy-Benazepril Hcl Swelling   • Benazepril Angioedema   • Ciprofloxacin Swelling     Tongue, lips   • Clindamycin/Lincomycin Unknown - High Severity   • Hops Oil    • Sulfa Antibiotics            Family and social history reviewed.     ROS  All other systems were reviewed and are negative          Objective:     Vitals:    02/09/22 1133   BP: 148/90   BP Location: Left arm   Patient Position: Sitting   Pulse: 78   Weight: (!) 156 kg (343 lb)   Height: 180.3 cm (71\")     Body mass index is 47.84 kg/m².    PHYSICAL EXAM:  Cardiovascular:      Normal rate. Regular rhythm.           ECG 12 Lead    Date/Time: 2/9/2022 11:57 AM  Performed by: April Martinez APRN  Authorized by: April Martinez APRN   Comparison: compared with previous ECG   Similar to previous ECG  Rhythm: sinus rhythm  Ectopy: multifocal PVCs  Rate: normal              Current Outpatient Medications   Medication Sig Dispense Refill   • acetaminophen (TYLENOL) 650 MG 8 hr tablet Take 1,000 mg by mouth Daily.     • albuterol (PROAIR RESPICLICK) 108 (90 Base) MCG/ACT inhaler Inhale 2 puffs As Needed.     • allopurinol (ZYLOPRIM) 300 MG tablet Take 1 tablet by mouth Daily. 90 tablet 0   • ASPIRIN ADULT LOW DOSE 81 MG EC tablet Take 1 tablet by mouth Daily. 90 tablet 0   • citalopram (CeleXA) 40 MG tablet Take 1 tablet by mouth Daily. 90 tablet 1   • clotrimazole-betamethasone (LOTRISONE) 1-0.05 % cream Apply  topically to the appropriate area as directed 2 (Two) Times a Day. 60 g 11   • cyclobenzaprine (FLEXERIL) 10 MG tablet TAKE 1/2 TO 1 TABLET BY MOUTH EVERY 8 HOURS AS NEEDED 30 tablet 0   • ezetimibe (ZETIA) 10 MG tablet Take 1 tablet by mouth Daily. 90 tablet 3   • fluticasone (FLONASE) 50 MCG/ACT nasal spray USE 2 SPRAYS IN EACH NOSTRIL AS DIRECTED BY PROVIDER DAILY 48 g 3   • " Fluticasone Furoate-Vilanterol (Breo Ellipta) 100-25 MCG/INH inhaler INHALE ONE DOSE BY MOUTH DAILY. HOLD FOR 5 SECONDS IF POSSIBLE . RINSE MOUTH AFTERWARD     • furosemide (LASIX) 20 MG tablet TAKE 3 TABLETS DAILY 270 tablet 3   • metoprolol tartrate (LOPRESSOR) 50 MG tablet TAKE 1 TABLET TWICE A  tablet 3   • modafinil (PROVIGIL) 200 MG tablet Take 1 tablet by mouth 2 (Two) Times a Day. (Patient taking differently: Take 200 mg by mouth As Needed.) 180 tablet 0   • montelukast (SINGULAIR) 10 MG tablet Take 10 mg by mouth Daily.     • NIFEdipine XL (PROCARDIA XL) 90 MG 24 hr tablet Take 1 tablet by mouth Daily. 90 tablet 1   • nystatin (MYCOSTATIN) 813777 UNIT/ML suspension As Needed.     • omeprazole (priLOSEC) 40 MG capsule Take 1 capsule by mouth Daily. 90 capsule 0   • spironolactone (ALDACTONE) 25 MG tablet Take 2 tablets by mouth Daily. 90 tablet 3   • traMADol (ULTRAM) 50 MG tablet Take 1 tablet by mouth Every 8 (Eight) Hours As Needed for Moderate Pain  or Severe Pain . 20 tablet 0   • venlafaxine XR (EFFEXOR-XR) 75 MG 24 hr capsule 2 tabs qd 180 capsule 3   • zolpidem (AMBIEN) 10 MG tablet TAKE 1 TABLET AT NIGHT AS NEEDED FOR SLEEP 30 tablet 0     No current facility-administered medications for this visit.     Assessment:       Diagnosis Plan   1. Coronary artery disease involving native coronary artery of native heart without angina pectoris     2. Essential hypertension     3. PAH (pulmonary arterial hypertension) with portal hypertension (HCC)          No orders of the defined types were placed in this encounter.        Plan:     1.  66-year-old gentleman with coronary artery disease status post distal left circumflex stenting 2018, preserved left ventricular systolic function.  He had a 50% mid to distal circumflex, 60-70% ostial marginal stenosis and 40-50% LAD stenosis at that time.  Cardiac catheterization December 2021 showed patent left circumflex stent and mild nonobstructive coronary  artery disease   2.  Hypertension blood pressure is better at home based on his list.  Discuss goal is less than 130/80 on average  3.  Hyperlipidemia on ezetimibe  4.  Morbid obesity BMI 47.84  5.  C2-C5 cervical stenosis-he follows with Dr. Rushing at [facility] who previously recommended surgical intervention but patient was not convinced he wanted that.  6.  Pulmonary hypertension he follows with Dr. Alex Maciel at [facility]  7. DIAZ treated with Bipap reports compliance  8. Premature ventricular contractions chronic, minimally symptomatic but no complex ventricular ectopy and stable  9.  Enlarged prostate-followed by Dr. Lopez    Follow-up will be in 4 months call questions or concerns.            It has been a pleasure to participate in this patient's care.      Thank you,  VERÓNICA Castañeda      **I used Dragon to dictate this note:**

## 2022-02-17 NOTE — TELEPHONE ENCOUNTER
Caller: Maik Lara    Relationship: Self    Best call back number: [phone]    Requested Prescriptions:   Requested Prescriptions     Pending Prescriptions Disp Refills   • metoprolol tartrate (LOPRESSOR) 50 MG tablet 180 tablet 3     Sig: Take 1 tablet by mouth 2 (Two) Times a Day.        Pharmacy where request should be sent: [facility] - [address] [phone] [facility] [phone]      Additional details provided by patient: N/A    Does the patient have less than a 3 day supply:  [] Yes  [x] No    Cornel Caceres, Comfort Rep   02/17/22 13:59 EST

## 2022-03-01 NOTE — TELEPHONE ENCOUNTER
PATIENT HAS TO GO [facility]ROUGH [facility] FOR FUTURE ZOLPIDEM REFILLS. HE WILL NEED A LOCAL RX AND A RX [facility]ENT TO [facility]. PT IS CONTACTING PULMONOLOGIST TO SEE IF THEY WILL FILL BUT DR CAGLE SAYS PATIENT NEEDS TO GET FROM PCP. HE ONLY HAS 4 TABLETS LEFT, CAN YOU SEND IN ASAP?

## 2022-03-01 NOTE — TELEPHONE ENCOUNTER
I can only send in 30 days at a time as ambien is controlled. I sent to local for refill. [facility] but will only be for 30 days at a time.

## 2022-03-07 NOTE — TELEPHONE ENCOUNTER
Received office note from Dr. Martín Magana at [facility] regarding cervical stenosis.  Patient is  following with pain management and may require epidurals however if needed, he would be cleared from cardiovascular standpoint for orthopedic surgery given recent cardiac catheterization in 12/2021 and stable coronary artery disease (Noncritical coronary atherosclerosis, patent LCx stent).  Patient is to have a CT of the cervical spine for preop evaluation.  He would be cleared for surgery from cardiac standpoint and ok to hold aspirin up to 5 days prior and resume aspirin ASAP once stable from orthopedic standpoint.      VERÓNICA Castañeda  [facility]   [address] [place] [address] [address]  [place] [address]  Ph: [phone]  Fax: [phone]      Irma- Please fax to Dr. Martín Magana at fax [phone]

## 2022-03-07 NOTE — TELEPHONE ENCOUNTER
Faxed copy of this telephone message to Dr. Martín Magana.  Fax# [phone].  Faxed confirmation received./ NIKA

## 2022-03-19 NOTE — ED NOTES
Mary Jo Francis son, Varinder Vargas  called [phone] to notify that patient has passed. Mr Erica Frias requested us to not call his little brother and mother who are en route to [facility] from [facility].  He fears they will not handle it well and asked we wait until they arrive     Ene Carrillo RN  03/19/22 7434

## 2022-03-19 NOTE — ED NOTES
Spoke with pt son Korin Feng. Korin Feng advised that he had contacted his mother (spouse) and notified of pt status. Spouse has returned to [place] and will not be arriving today. I gave Lula Conner  telephone number to finalize the death certificate. Korin Feng stated he will notify us with [facility] today or tomorrow. Pt to be moved to [facility] for preservation.      Jensen Cook RN  03/19/22 0393

## 2022-03-19 NOTE — ED NOTES
Patient arrived by [facility]. EMS reports semi-witnessed collapse. EMS reports patient being with a friend at the gas station, the friend thought the patient was sleeping, friend went inside the store, when the friend came back out patient was unresponsive and gurgling/foaming from the mouth. Upon arrival patient had been down for approximately 45-50 mins, asystole whole time in route. EMS tubed the patient and IO placed in route. EMS reports 6 rounds of EPI given in route. [id number] - Patient arrived. CPR in progress. [address] Epi given by Luli Patel RN. CPR still in progress. 1344 - Pulse check. No pulse detected. CPR resumed. 1346 - Pulse check. No pulse detected. CPR resumed. [id number] 21 - Epi given by Luli Patel RN. CPR still in progress. 1349 - Pulse check. No pulse detected. 80 [facility]. No cardiac activity detected per  [facility]. 1350 - Time of death.        Charly Valdivia RN  03/19/22 8838

## 2022-03-19 NOTE — ED PROVIDER NOTES
family history on file. SOCIAL HISTORY       Social History     Socioeconomic History    Marital status: Unknown     Spouse name: Not on file    Number of children: Not on file    Years of education: Not on file    Highest education level: Not on file   Occupational History    Not on file   Tobacco Use    Smoking status: Not on file    Smokeless tobacco: Not on file   Substance and Sexual Activity    Alcohol use: Not on file    Drug use: Not on file    Sexual activity: Not on file   Other Topics Concern    Not on file   Social History Narrative    Not on file     Social Determinants of Health     Financial Resource Strain:     Difficulty of Paying Living Expenses: Not on file   Food Insecurity:     Worried About Running Out of Food in the Last Year: Not on file    Hussain of Food in the Last Year: Not on file   Transportation Needs:     Lack of Transportation (Medical): Not on file    Lack of Transportation (Non-Medical):  Not on file   Physical Activity:     Days of Exercise per Week: Not on file    Minutes of Exercise per Session: Not on file   Stress:     Feeling of Stress : Not on file   Social Connections:     Frequency of Communication with Friends and Family: Not on file    Frequency of Social Gatherings with Friends and Family: Not on file    Attends Church Services: Not on file    Active Member of [address] [place] [address] [employer] or Organizations: Not on file    Attends Club or Organization Meetings: Not on file    Marital Status: Not on file   Intimate Partner Violence:     Fear of Current or Ex-Partner: Not on file    Emotionally Abused: Not on file    Physically Abused: Not on file    Sexually Abused: Not on file   Housing Stability:     Unable to Pay for Housing in the Last Year: Not on file    Number of [place] in the Last Year: Not on file    Unstable Housing in the Last Year: Not on file       SCREENINGS             PHYSICAL EXAM    (up to 7 for level 4, 8 or more for level 5) ED Triage Vitals   BP Temp Temp src Pulse Resp SpO2 Height Weight   -- -- -- -- -- -- -- --       Physical Exam  Constitutional:       Appearance: He is obese. Interventions: He is intubated. Comments: Nonresponsive   HENT:      Head: Normocephalic and atraumatic. Nose: Nose normal.      Mouth/Throat:      Mouth: Mucous membranes are moist.      Pharynx: Oropharynx is clear. Comments: ET tube present  Eyes:      Comments: Pupils are fixed and midrange. Doll's eyes present. Conjunctivae is injected bilaterally. Cardiovascular:      Comments: Asystole on monitor  Pulmonary:      Effort: He is intubated. Comments: Intubated with bilateral breath sounds. Genitourinary:     Comments: Nothing odd on circumcised male  Musculoskeletal:         General: No deformity or signs of injury. Comments: I/O access left tibia   Skin:     General: Skin is warm. Comments: No obvious external evidence of injury   Neurological:      Comments: Unresponsive. No evidence of spontaneous neurologic activity. Pupils fixed. No spontaneous respirations. DIAGNOSTIC RESULTS     EKG: All EKG's are interpreted by the Emergency Department Physician who either signs or Co-signs this chart in the absence of a cardiologist.        RADIOLOGY:   Non-plain film images such as CT, Ultrasound and MRI are read by the radiologist. Plainradiographic images are visualized and preliminarily interpreted by the emergency physician with the below findings:        Interpretation per the Radiologist below, if available at the time of this note:    No orders to display         ED BEDSIDE ULTRASOUND:   Performed by ED Physician - none    LABS:  Labs Reviewed - No data to display    All other labs were within normal range or not returned as of this dictation. EMERGENCY DEPARTMENT COURSE and DIFFERENTIALDIAGNOSIS/MDM:   Vitals: There were no vitals filed for this visit.     MDM  Number of Diagnoses or Management Options  Death, sudden, unexpected, adult  Diagnosis management comments: We continued resuscitative efforts. We adjusted the patient off the spine board to improve high-quality CPR being given to the patient. Continued with epi. Every rhythm check was asystole. I brought the bedside ultrasound and and there is no cardiac activity. Not even a wiggle. At this point with discussion of the code team our options have run out to help this man. With the excessive time that has passed lack of response and suspect that he had downtime without resuscitative efforts we have stopped the resuscitative efforts. CONSULTS:  None    PROCEDURES:  Unless otherwise notedbelow, none     Procedures    FINAL IMPRESSION     1.  Death, sudden, unexpected, adult          DISPOSITION/PLAN   DISPOSITION  2022 01:58:43 PM      PATIENT REFERRED TO:  @FUP@    DISCHARGE MEDICATIONS:  New Prescriptions    No medications on file          (Please note that portions of this note were completed with a voice recognition program.  Efforts were made to edit the dictations butoccasionally words are mis-transcribed.)    Jean-Pierre Negron MD (electronically signed)  AttendingEmergency Physician          Gemma Royal MD  [phone]
